# Patient Record
Sex: FEMALE | Race: WHITE | NOT HISPANIC OR LATINO | Employment: OTHER | URBAN - METROPOLITAN AREA
[De-identification: names, ages, dates, MRNs, and addresses within clinical notes are randomized per-mention and may not be internally consistent; named-entity substitution may affect disease eponyms.]

---

## 2017-07-14 DIAGNOSIS — I10 ESSENTIAL HYPERTENSION: ICD-10-CM

## 2017-07-14 RX ORDER — LISINOPRIL AND HYDROCHLOROTHIAZIDE 10; 12.5 MG/1; MG/1
TABLET ORAL
Qty: 90 TABLET | Refills: 0 | Status: SHIPPED | OUTPATIENT
Start: 2017-07-14 | End: 2017-10-10 | Stop reason: SDUPTHER

## 2017-10-06 DIAGNOSIS — Z78.0 ASYMPTOMATIC MENOPAUSAL STATE: Primary | ICD-10-CM

## 2017-10-06 DIAGNOSIS — Z12.39 SCREENING FOR BREAST CANCER: ICD-10-CM

## 2017-10-09 ENCOUNTER — DOCUMENTATION ONLY (OUTPATIENT)
Dept: FAMILY MEDICINE | Facility: CLINIC | Age: 72
End: 2017-10-09

## 2017-10-09 NOTE — PROGRESS NOTES
Pre-Visit Chart Review  For Appointment Scheduled on 10-10-17    Health Maintenance Due   Topic Date Due    Zoster Vaccine  03/29/2005    Pneumococcal (65+) (1 of 2 - PCV13) 03/29/2010    Mammogram  06/03/2015    DEXA SCAN  06/03/2016    Influenza Vaccine  08/01/2017

## 2017-10-10 ENCOUNTER — OFFICE VISIT (OUTPATIENT)
Dept: FAMILY MEDICINE | Facility: CLINIC | Age: 72
End: 2017-10-10
Payer: MEDICARE

## 2017-10-10 VITALS
WEIGHT: 170.44 LBS | BODY MASS INDEX: 31.36 KG/M2 | DIASTOLIC BLOOD PRESSURE: 54 MMHG | OXYGEN SATURATION: 96 % | SYSTOLIC BLOOD PRESSURE: 122 MMHG | TEMPERATURE: 99 F | HEART RATE: 80 BPM | HEIGHT: 62 IN | RESPIRATION RATE: 16 BRPM

## 2017-10-10 DIAGNOSIS — Z23 IMMUNIZATION DUE: ICD-10-CM

## 2017-10-10 DIAGNOSIS — I10 ESSENTIAL HYPERTENSION: ICD-10-CM

## 2017-10-10 DIAGNOSIS — M19.90 ARTHRITIS: ICD-10-CM

## 2017-10-10 DIAGNOSIS — Z00.00 ANNUAL PHYSICAL EXAM: Primary | ICD-10-CM

## 2017-10-10 DIAGNOSIS — E78.5 HYPERLIPIDEMIA, UNSPECIFIED HYPERLIPIDEMIA TYPE: ICD-10-CM

## 2017-10-10 DIAGNOSIS — I10 GOOD HYPERTENSION CONTROL: ICD-10-CM

## 2017-10-10 DIAGNOSIS — N18.30 STAGE 3 CHRONIC KIDNEY DISEASE: ICD-10-CM

## 2017-10-10 PROCEDURE — 99999 PR PBB SHADOW E&M-EST. PATIENT-LVL IV: CPT | Mod: PBBFAC,,, | Performed by: FAMILY MEDICINE

## 2017-10-10 PROCEDURE — 90662 IIV NO PRSV INCREASED AG IM: CPT | Mod: S$GLB,,, | Performed by: FAMILY MEDICINE

## 2017-10-10 PROCEDURE — G0009 ADMIN PNEUMOCOCCAL VACCINE: HCPCS | Mod: S$GLB,,, | Performed by: FAMILY MEDICINE

## 2017-10-10 PROCEDURE — G0008 ADMIN INFLUENZA VIRUS VAC: HCPCS | Mod: S$GLB,,, | Performed by: FAMILY MEDICINE

## 2017-10-10 PROCEDURE — 90670 PCV13 VACCINE IM: CPT | Mod: S$GLB,,, | Performed by: FAMILY MEDICINE

## 2017-10-10 PROCEDURE — 99499 UNLISTED E&M SERVICE: CPT | Mod: S$GLB,,, | Performed by: FAMILY MEDICINE

## 2017-10-10 PROCEDURE — 99397 PER PM REEVAL EST PAT 65+ YR: CPT | Mod: S$GLB,,, | Performed by: FAMILY MEDICINE

## 2017-10-10 RX ORDER — LISINOPRIL AND HYDROCHLOROTHIAZIDE 10; 12.5 MG/1; MG/1
TABLET ORAL
Qty: 90 TABLET | Refills: 3 | Status: SHIPPED | OUTPATIENT
Start: 2017-10-10 | End: 2018-10-12 | Stop reason: SDUPTHER

## 2017-10-10 NOTE — PROGRESS NOTES
Subjective:       Patient ID: Kelly Queen is a 72 y.o. female.    Chief Complaint: Annual Exam    72-year-old female coming in for an annual exam.  She needs a Prevnar 13 and high-dose flu vaccine and already has orders for mammogram and DEXA scan.  She is not fasting for lab.  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  She status post bilateral knee replacements, left hip and also a femur fracture with a uri.    Past Medical History:  No date: Arthritis  2/25/15: Carpal tunnel syndrome      Comment: left; last injected 2/25/15  11/14/2014: Chronic kidney disease, stage III (moderate)  No date: Hypertension    Past Surgical History:  No date: APPENDECTOMY  3/30/2010: COLONOSCOPY      Comment: Dr. Prince, ten year recheck  5/21/15: FEMUR FRACTURE SURGERY Left      Comment: DONNIE lund (L) distal femur  No date: FRACTURE SURGERY      Comment: 5- femer   No date: HYSTERECTOMY  6/7/13: JOINT REPLACEMENT Left      Comment: (L) TKA  No date: TONSILLECTOMY    Review of patient's family history indicates:    Diabetes                       Sister                    Cancer                         Sister                      Comment: colon    Hypertension                   Sister                    Diabetes                       Brother                   Cancer                         Brother                     Comment: lung    Basal cell carcinoma           Daughter                  Skin cancer                    Daughter                    Comment: squnamus     Hypertension                   Mother                    No Known Problems              Son                       Diabetes                       Brother                   Heart disease                  Brother                   Social History    Marital status:              Spouse name:                       Years of education:                 Number of children:               Social History  Main Topics    Smoking status: Never Smoker                                                                Smokeless tobacco: Never Used                        Alcohol use: No              Drug use: No                Current Outpatient Prescriptions:     aspirin 81 mg Tab, Take 1 tablet by mouth once daily. , Disp: , Rfl:     biotin 10,000 mcg Cap, Take 1 capsule by mouth once daily., Disp: , Rfl:     cyanocobalamin, vitamin B-12, 1,000 mcg TbSR, Take 1 tablet by mouth once daily., Disp: , Rfl:     fexofenadine (ALLEGRA) 180 MG tablet, Take 180 mg by mouth daily as needed. Every day PRN, Disp: , Rfl:     furosemide (LASIX) 40 MG tablet, Take 40 mg by mouth daily as needed., Disp: , Rfl:     garlic 1,000 mg Cap, Take 1 tablet by mouth once daily. , Disp: , Rfl:     lisinopril-hydrochlorothiazide (PRINZIDE,ZESTORETIC) 10-12.5 mg per tablet, TAKE 1 TABLET ONE TIME DAILY, Disp: 90 tablet, Rfl: 3    multivitamin capsule, Take 1 capsule by mouth once daily. , Disp: , Rfl:     Zoster Vaccine due on 03/29/2005  Pneumococcal (65+)(1 of 2 - PCV13) due on 03/29/2010  Mammogram due on 06/03/2015  DEXA SCAN due on 06/03/2016  Influenza Vaccine due on 08/01/2017        Review of Systems   Constitutional: Negative for chills, diaphoresis, fatigue, fever and unexpected weight change.   HENT: Negative for congestion, ear pain, hearing loss, postnasal drip, sinus pressure, sneezing, sore throat, tinnitus and trouble swallowing.    Eyes: Negative for itching and visual disturbance.   Respiratory: Negative for cough, chest tightness, shortness of breath and wheezing.    Cardiovascular: Negative for chest pain, palpitations and leg swelling.   Gastrointestinal: Negative for abdominal pain, blood in stool, constipation, diarrhea, nausea and vomiting.   Genitourinary: Negative for dysuria, frequency, hematuria, menstrual problem, pelvic pain, vaginal bleeding and vaginal discharge.   Musculoskeletal: Positive for arthralgias.  Negative for back pain, joint swelling and myalgias.   Skin: Negative for color change and rash.   Neurological: Negative for dizziness and headaches.   Hematological: Negative for adenopathy.   Psychiatric/Behavioral: Negative for sleep disturbance. The patient is not nervous/anxious.        Objective:      Physical Exam   Constitutional: She is oriented to person, place, and time. She appears well-developed. No distress.   Good blood pressure control  Obese with BMI 31.2.  She is down 2.4 pounds from her last physical August 29, 2016   HENT:   Head: Normocephalic and atraumatic.   Right Ear: External ear normal.   Left Ear: External ear normal.   Nose: Nose normal.   Mouth/Throat: Oropharynx is clear and moist. No oropharyngeal exudate.   Eyes: Conjunctivae are normal. Pupils are equal, round, and reactive to light. Right eye exhibits no discharge. Left eye exhibits no discharge. No scleral icterus.   Neck: Normal range of motion. Neck supple. No JVD present. No tracheal deviation present. No thyromegaly present.   Cardiovascular: Normal rate, regular rhythm and normal heart sounds.  Exam reveals no gallop and no friction rub.    No murmur heard.  Carotid pulses 2+ without bruit   Pulmonary/Chest: Effort normal and breath sounds normal. No respiratory distress. She has no wheezes. She has no rales. She exhibits no tenderness.   Abdominal: Soft. Bowel sounds are normal. She exhibits no distension and no mass. There is no tenderness. There is no rebound and no guarding.   Musculoskeletal: Normal range of motion. She exhibits no edema or tenderness.   Lymphadenopathy:     She has no cervical adenopathy.   Neurological: She is alert and oriented to person, place, and time. She has normal reflexes. She displays normal reflexes. No cranial nerve deficit or sensory deficit. She exhibits normal muscle tone.   Skin: Skin is warm and dry. No rash noted. She is not diaphoretic. No erythema.   Psychiatric: She has a normal  mood and affect. Her behavior is normal. Thought content normal.   Nursing note and vitals reviewed.      Assessment:       1. Annual physical exam    2. Good hypertension control    3. Hyperlipidemia, unspecified hyperlipidemia type    4. Stage 3 chronic kidney disease    5. Arthritis    6. Immunization due    7. Essential hypertension    8. BMI 31.0-31.9,adult        Plan:       1. Annual physical exam  - Comprehensive metabolic panel; Future  - Lipid panel; Future  - CBC auto differential; Future    2. Good hypertension control  No changes on blood pressure medications necessary  - Comprehensive metabolic panel; Future  - Lipid panel; Future  - CBC auto differential; Future    3. Hyperlipidemia, unspecified hyperlipidemia type  - Lipid panel; Future    4. Stage 3 chronic kidney disease  - Comprehensive metabolic panel; Future  - CBC auto differential; Future    5. Arthritis    6. Immunization due  - Influenza - High Dose (65+) (PF) (IM)  - (In Office Administered) Pneumococcal Conjugate Vaccine (13 Valent) (IM)    7. Essential hypertension  - lisinopril-hydrochlorothiazide (PRINZIDE,ZESTORETIC) 10-12.5 mg per tablet; TAKE 1 TABLET ONE TIME DAILY  Dispense: 90 tablet; Refill: 3    8. BMI 31.0-31.9,adult         Patient readiness: acceptance and barriers:none    During the course of the visit the patient was educated and counseled about the following:     Hypertension:   Medication: no change.  Dietary sodium restriction.  Regular aerobic exercise.  Obesity:   General weight loss/lifestyle modification strategies discussed (elicit support from others; identify saboteurs; non-food rewards, etc).  Informal exercise measures discussed, e.g. taking stairs instead of elevator.  Regular aerobic exercise program discussed.    Goals: Hypertension: Reduce Blood Pressure and Obesity: Reduce calorie intake and BMI    Did patient meet goals/outcomes: Yes    The following self management tools provided: blood pressure  log  excercise log    Patient Instructions (the written plan) was given to the patient/family.     Time spent with patient: 45 minutes

## 2017-10-10 NOTE — PROGRESS NOTES
Pt. Identified using  and name. VIS given to pt. Procedure was explained, pt. Verbalized understanding. Prevnar 13 administered IM in the right deltoid using sterile technique. Flue HD administered IM in the left arm,  no bleeding noted at injection sites.  Pt. tolerated procedure well.

## 2017-10-10 NOTE — PATIENT INSTRUCTIONS
Weight Management: Getting Started  Healthy bodies come in all shapes and sizes. Not all bodies are made to be thin. For some people, a healthy weight is higher than the average weight listed on weight charts. Your healthcare provider can help you decide on a healthy weight for you.    Reasons to lose weight  Losing weight can help with some health problems, such as high blood pressure, heart disease, diabetes, sleep apnea, and arthritis. You may also feel more energy.  Set your long-term goal  Your goal doesn't even have to be a specific weight. You may decide on a fitness goal (such as being able to walk 10 miles a week), or a health goal (such as lowering your blood pressure). Choose a goal that is measurable and reasonable, so you know when you've reached it. A goal of reaching a BMI of less than 25 is not always reasonable (or possible).   Make an action plan  Habits dont change overnight. Setting your goals too high can leave you feeling discouraged if you cant reach them. Be realistic. Choose one or two small changes you can make now. Set an action plan for how you are going to make these changes. When you can stick to this plan, keep making a few more small changes. Taking small steps will help you stay on the path to success.  Track your progress  Write down your goals. Then, keep a daily record of your progress. Write down what you eat and how active you are. This record lets you look back on how much youve done. It may also help when youre feeling frustrated. Reward yourself for success. Even if you dont reach every goal, give yourself credit for what you do get done.  Get support  Encouragement from others can help make losing weight easier. Ask your family members and friends for support. They may even want to join you. Also look to your healthcare provider, registered dietitian, and  for help. Your local hospital can give you more information about nutrition, exercise, and  weight loss.  Date Last Reviewed: 1/31/2016 © 2000-2017 [employer]. [address] [place] [address], [place] [address]. All rights reserved. This information is not intended as a substitute for professional medical care. Always follow your healthcare professional's instructions.        Walking for Fitness  Fitness walking has something for everyone, even people who are already fit. Walking is one of the safest ways to condition your body aerobically. It can boost energy, help you lose weight, and reduce stress.    Physical benefits  · Walking strengthens your heart and lungs, and tones your muscles.  · When walking, your feet land with less impact than in other sports. This reduces chances of muscle, bone, and joint injury.  · Regular walking improves your cholesterol levels and lowers your risk of heart disease. And it helps you control your blood sugar if you have diabetes.  · Walking is a weight-bearing activity, which helps maintain bone density. This can help prevent osteoporosis.  Personal rewards  · Taking walks can help you relax and manage stress. And fitness walking may make you feel better about yourself.  · Walking can help you sleep better at night and make you less likely to be depressed.  · Regular walking may help maintain your memory as you get older.  · Walking is a great way to spend extra time with friends and family members. Be sure to invite your dog along!  Q&A about fitness walking  Q: Will walking keep me fit?  A: Yes. Regular walking at the right pace gives you all the benefits of other aerobic activities, such as jogging and swimming.  Q: Will walking help me lose weight and keep it off?  A: Yes. Per mile, walking can burn as many calories as jogging. Your health care provider can help work walking into your weight-loss plan.  Q: Is walking safe for my health?  A: Yes. Walking is safe if you have high blood pressure, diabetes, heart disease, or other conditions. Talk to your  healthcare provider before you start.  Date Last Reviewed: 4/1/2017  © 0557-5604 [employer]. [address] [place] [address], [place] [address]. All rights reserved. This information is not intended as a substitute for professional medical care. Always follow your healthcare professional's instructions.

## 2017-10-11 ENCOUNTER — HOSPITAL ENCOUNTER (OUTPATIENT)
Dept: RADIOLOGY | Facility: CLINIC | Age: 72
Discharge: HOME | End: 2017-10-11
Attending: FAMILY MEDICINE
Payer: MEDICARE

## 2017-10-11 DIAGNOSIS — Z12.39 SCREENING FOR BREAST CANCER: ICD-10-CM

## 2017-10-11 DIAGNOSIS — Z78.0 ASYMPTOMATIC MENOPAUSAL STATE: ICD-10-CM

## 2017-10-11 PROCEDURE — 77080 DXA BONE DENSITY AXIAL: CPT | Mod: TC,PO

## 2017-10-11 PROCEDURE — 77067 SCR MAMMO BI INCL CAD: CPT | Mod: 26,,, | Performed by: RADIOLOGY

## 2017-10-11 PROCEDURE — 77080 DXA BONE DENSITY AXIAL: CPT | Mod: 26,,, | Performed by: RADIOLOGY

## 2017-10-11 PROCEDURE — 77063 BREAST TOMOSYNTHESIS BI: CPT | Mod: 26,,, | Performed by: RADIOLOGY

## 2017-10-11 PROCEDURE — 77067 SCR MAMMO BI INCL CAD: CPT | Mod: TC

## 2018-02-23 ENCOUNTER — PES CALL (OUTPATIENT)
Dept: ADMINISTRATIVE | Facility: CLINIC | Age: 73
End: 2018-02-23

## 2018-05-09 ENCOUNTER — OFFICE VISIT (OUTPATIENT)
Dept: FAMILY MEDICINE | Facility: CLINIC | Age: 73
End: 2018-05-09
Payer: MEDICARE

## 2018-05-09 VITALS
HEART RATE: 74 BPM | DIASTOLIC BLOOD PRESSURE: 58 MMHG | HEIGHT: 62 IN | SYSTOLIC BLOOD PRESSURE: 144 MMHG | RESPIRATION RATE: 18 BRPM | WEIGHT: 168.44 LBS | BODY MASS INDEX: 31 KG/M2 | OXYGEN SATURATION: 96 % | TEMPERATURE: 99 F

## 2018-05-09 DIAGNOSIS — J32.4 PANSINUSITIS, UNSPECIFIED CHRONICITY: Primary | ICD-10-CM

## 2018-05-09 PROCEDURE — 99999 PR PBB SHADOW E&M-EST. PATIENT-LVL III: CPT | Mod: PBBFAC,,, | Performed by: FAMILY MEDICINE

## 2018-05-09 PROCEDURE — 3078F DIAST BP <80 MM HG: CPT | Mod: CPTII,S$GLB,, | Performed by: FAMILY MEDICINE

## 2018-05-09 PROCEDURE — 3077F SYST BP >= 140 MM HG: CPT | Mod: CPTII,S$GLB,, | Performed by: FAMILY MEDICINE

## 2018-05-09 PROCEDURE — 99214 OFFICE O/P EST MOD 30 MIN: CPT | Mod: S$GLB,,, | Performed by: FAMILY MEDICINE

## 2018-05-09 RX ORDER — PREDNISONE 20 MG/1
TABLET ORAL
Qty: 10 TABLET | Refills: 0 | Status: SHIPPED | OUTPATIENT
Start: 2018-05-09 | End: 2018-10-25 | Stop reason: ALTCHOICE

## 2018-05-09 RX ORDER — AMOXICILLIN AND CLAVULANATE POTASSIUM 875; 125 MG/1; MG/1
1 TABLET, FILM COATED ORAL 2 TIMES DAILY
Qty: 20 TABLET | Refills: 0 | Status: SHIPPED | OUTPATIENT
Start: 2018-05-09 | End: 2018-05-19

## 2018-05-09 NOTE — PROGRESS NOTES
Subjective:       Patient ID: Kelly Queen is a 73 y.o. female.    Chief Complaint: No chief complaint on file.    Sinusitis   This is a new problem. The current episode started 1 to 4 weeks ago. The problem has been gradually worsening since onset. Maximum temperature: subjective. The pain is moderate. Associated symptoms include congestion, coughing, sinus pressure and a sore throat. Pertinent negatives include no shortness of breath. Treatments tried: Mucinex Sinus Max. The treatment provided mild relief.     Review of Systems   Constitutional: Negative for fever.   HENT: Positive for congestion, sinus pressure and sore throat.    Respiratory: Positive for cough. Negative for shortness of breath.    Cardiovascular: Negative for chest pain.   Gastrointestinal: Negative for abdominal pain and nausea.   Skin: Negative for rash.   All other systems reviewed and are negative.      Objective:      Physical Exam   Constitutional: She appears well-developed. No distress.   HENT:   Right Ear: Tympanic membrane normal. Tympanic membrane is not erythematous.   Left Ear: Tympanic membrane normal. Tympanic membrane is not erythematous.   Nose: Mucosal edema present. Right sinus exhibits maxillary sinus tenderness. Left sinus exhibits maxillary sinus tenderness and frontal sinus tenderness.   Mouth/Throat: Posterior oropharyngeal erythema present. Tonsils are 2+ on the right. Tonsils are 2+ on the left. No tonsillar exudate (erythema and swelling noted here; pt is s/p Tonsillectomy).   Neck: Neck supple.   Cardiovascular: Normal rate and regular rhythm.    No murmur heard.  Pulmonary/Chest: Effort normal and breath sounds normal.   Lymphadenopathy:     She has no cervical adenopathy.   Skin: Skin is warm and dry.       Assessment:       1. Pansinusitis, unspecified chronicity        Plan:         Diagnoses and all orders for this visit:    Pansinusitis, unspecified chronicity    Other orders  -     amoxicillin-clavulanate  875-125mg (AUGMENTIN) 875-125 mg per tablet; Take 1 tablet by mouth 2 (two) times daily. Take after meals.  -     predniSONE (DELTASONE) 20 MG tablet; One BID for 3 days then once a day orally

## 2018-05-25 ENCOUNTER — PES CALL (OUTPATIENT)
Dept: ADMINISTRATIVE | Facility: CLINIC | Age: 73
End: 2018-05-25

## 2018-08-01 ENCOUNTER — PES CALL (OUTPATIENT)
Dept: ADMINISTRATIVE | Facility: CLINIC | Age: 73
End: 2018-08-01

## 2018-10-12 DIAGNOSIS — I10 ESSENTIAL HYPERTENSION: ICD-10-CM

## 2018-10-12 RX ORDER — LISINOPRIL AND HYDROCHLOROTHIAZIDE 10; 12.5 MG/1; MG/1
TABLET ORAL
Qty: 90 TABLET | Refills: 0 | Status: SHIPPED | OUTPATIENT
Start: 2018-10-12 | End: 2019-04-25 | Stop reason: SDUPTHER

## 2018-10-25 ENCOUNTER — OFFICE VISIT (OUTPATIENT)
Dept: FAMILY MEDICINE | Facility: CLINIC | Age: 73
End: 2018-10-25
Payer: MEDICARE

## 2018-10-25 VITALS
HEIGHT: 62 IN | DIASTOLIC BLOOD PRESSURE: 73 MMHG | HEART RATE: 80 BPM | BODY MASS INDEX: 31.1 KG/M2 | WEIGHT: 169 LBS | TEMPERATURE: 99 F | SYSTOLIC BLOOD PRESSURE: 135 MMHG

## 2018-10-25 DIAGNOSIS — Z23 NEED FOR INFLUENZA VACCINATION: ICD-10-CM

## 2018-10-25 DIAGNOSIS — Z79.899 HIGH RISK MEDICATION USE: ICD-10-CM

## 2018-10-25 DIAGNOSIS — Z23 NEED FOR PNEUMOCOCCAL VACCINATION: ICD-10-CM

## 2018-10-25 DIAGNOSIS — E78.5 DYSLIPIDEMIA: ICD-10-CM

## 2018-10-25 DIAGNOSIS — R73.03 PREDIABETES: ICD-10-CM

## 2018-10-25 DIAGNOSIS — I10 ESSENTIAL HYPERTENSION: Primary | ICD-10-CM

## 2018-10-25 DIAGNOSIS — M17.0 PRIMARY OSTEOARTHRITIS OF BOTH KNEES: ICD-10-CM

## 2018-10-25 DIAGNOSIS — N18.30 CHRONIC KIDNEY DISEASE, STAGE III (MODERATE): ICD-10-CM

## 2018-10-25 DIAGNOSIS — Z79.01 ANTICOAGULANT LONG-TERM USE: ICD-10-CM

## 2018-10-25 PROCEDURE — 90662 IIV NO PRSV INCREASED AG IM: CPT | Mod: PBBFAC,PO

## 2018-10-25 PROCEDURE — 1101F PT FALLS ASSESS-DOCD LE1/YR: CPT | Mod: CPTII,,, | Performed by: NURSE PRACTITIONER

## 2018-10-25 PROCEDURE — 99999 PR PBB SHADOW E&M-EST. PATIENT-LVL IV: CPT | Mod: PBBFAC,,, | Performed by: NURSE PRACTITIONER

## 2018-10-25 PROCEDURE — 99499 UNLISTED E&M SERVICE: CPT | Mod: HCNC,S$GLB,, | Performed by: NURSE PRACTITIONER

## 2018-10-25 PROCEDURE — 3078F DIAST BP <80 MM HG: CPT | Mod: CPTII,,, | Performed by: NURSE PRACTITIONER

## 2018-10-25 PROCEDURE — 99214 OFFICE O/P EST MOD 30 MIN: CPT | Mod: PBBFAC,PO,25 | Performed by: NURSE PRACTITIONER

## 2018-10-25 PROCEDURE — 3075F SYST BP GE 130 - 139MM HG: CPT | Mod: CPTII,,, | Performed by: NURSE PRACTITIONER

## 2018-10-25 PROCEDURE — 99214 OFFICE O/P EST MOD 30 MIN: CPT | Mod: S$PBB,25,, | Performed by: NURSE PRACTITIONER

## 2018-10-25 PROCEDURE — 90732 PPSV23 VACC 2 YRS+ SUBQ/IM: CPT | Mod: PBBFAC,PO

## 2018-10-25 RX ORDER — NAPROXEN SODIUM 220 MG
220 TABLET ORAL 2 TIMES DAILY WITH MEALS
COMMUNITY
Start: 2018-10-25 | End: 2019-04-25 | Stop reason: ALTCHOICE

## 2018-10-25 NOTE — PROGRESS NOTES
Subjective:       Patient ID: Kelly Queen is a 73 y.o. female.    Chief Complaint: Hypertension    Chief Complaint  Chief Complaint   Patient presents with    Hypertension       HPI  Kelly Queen is a 73 y.o. female with medical diagnoses as listed in the medical history and problem list that presents for a follow-up on hypertension. Patient is regularly treated for HTN, CKD3, and osteoarthritis. Last Fasting Glucose was 116 on 10/11/17. Blood pressure today is 135/73. BMI today is 30.91 and patient has gained 1 pound since last visit. Established patient with last clinic appointment 5/9/2018.      PAST MEDICAL HISTORY:  Past Medical History:   Diagnosis Date    Arthritis     Carpal tunnel syndrome 2/25/15    left; last injected 2/25/15    Chronic kidney disease, stage III (moderate) 11/14/2014    Hypertension        PAST SURGICAL HISTORY:  Past Surgical History:   Procedure Laterality Date    APPENDECTOMY      COLONOSCOPY  3/30/2010    Dr. Prince, ten year recheck    FEMUR FRACTURE SURGERY Left 5/21/15    IM rodding (L) distal femur    FRACTURE SURGERY      5- femer     HYSTERECTOMY      JOINT REPLACEMENT Left 6/7/13    (L) TKA    OOPHORECTOMY      TONSILLECTOMY         SOCIAL HISTORY:  Social History     Socioeconomic History    Marital status:      Spouse name: Not on file    Number of children: Not on file    Years of education: Not on file    Highest education level: Not on file   Social Needs    Financial resource strain: Not on file    Food insecurity - worry: Not on file    Food insecurity - inability: Not on file    Transportation needs - medical: Not on file    Transportation needs - non-medical: Not on file   Occupational History    Not on file   Tobacco Use    Smoking status: Never Smoker    Smokeless tobacco: Never Used   Substance and Sexual Activity    Alcohol use: No    Drug use: No    Sexual activity: Not on file   Other Topics Concern    Not on  file   Social History Narrative    Not on file       FAMILY HISTORY:  Family History   Problem Relation Age of Onset    Diabetes Sister     Cancer Sister         colon    Hypertension Sister     Diabetes Brother     Cancer Brother         lung    Basal cell carcinoma Daughter     Skin cancer Daughter         squnamus     Hypertension Mother     No Known Problems Son     Diabetes Brother     Heart disease Brother        ALLERGIES AND MEDICATIONS: updated and reviewed.  Review of patient's allergies indicates:   Allergen Reactions    Codeine      Other reaction(s): pt does not want to take     Current Outpatient Medications   Medication Sig Dispense Refill    aspirin 81 mg Tab Take 1 tablet by mouth once daily.       biotin 10,000 mcg Cap Take 1 capsule by mouth once daily.      cyanocobalamin, vitamin B-12, 1,000 mcg TbSR Take 1 tablet by mouth once daily.      fexofenadine (ALLEGRA) 180 MG tablet Take 180 mg by mouth daily as needed. Every day PRN      furosemide (LASIX) 40 MG tablet Take 40 mg by mouth daily as needed.      garlic 1,000 mg Cap Take 1 tablet by mouth once daily.       lisinopril-hydrochlorothiazide (PRINZIDE,ZESTORETIC) 10-12.5 mg per tablet TAKE 1 TABLET EVERY DAY 90 tablet 0    multivitamin capsule Take 1 capsule by mouth once daily.       naproxen sodium (ALEVE) 220 MG tablet Take 1 tablet (220 mg total) by mouth 2 (two) times daily with meals. PRN arthritic pain       No current facility-administered medications for this visit.        I have reviewed the patient's medical history in detail and updated the computerized patient record.    Review of Systems   Constitutional: Negative for activity change, appetite change, chills, fatigue, fever and unexpected weight change.        Patient does not exercise regularly. Active lifestyle.    HENT: Positive for hearing loss. Negative for congestion, ear discharge, ear pain, postnasal drip, rhinorrhea, sinus pressure, sinus pain,  "sneezing and sore throat.         Patient wears bilateral hearing aids.    Eyes: Negative for pain, discharge, itching and visual disturbance.        Last eye exam 2 years ago. Patient wear OTC readers.    Respiratory: Negative for cough, chest tightness, shortness of breath and wheezing.    Cardiovascular: Negative for chest pain, palpitations and leg swelling.   Gastrointestinal: Negative for abdominal pain, constipation, diarrhea, nausea and vomiting.   Genitourinary: Negative for decreased urine volume, difficulty urinating, frequency, hematuria, urgency and vaginal bleeding.        Nocturia. Patient wakes up to from 1- 2 times a night to urinate. Total hysterectomy 1989.    Musculoskeletal: Positive for arthralgias and joint swelling. Negative for back pain, myalgias, neck pain and neck stiffness.        Patient reports has arthritis to bilateral knees and pain is controlled with Aleve or Tylenol over the counter as needed.   Skin: Negative for rash and wound.   Neurological: Negative for dizziness, weakness, light-headedness, numbness and headaches.   Hematological: Does not bruise/bleed easily.   Psychiatric/Behavioral: Positive for sleep disturbance. Negative for dysphoric mood. The patient is not nervous/anxious.         Patient reports she goes through stages where she falls asleep easily depending on what she is reading while laying in the bed. Patient falls back to sleep easily after waking up.          Objective:      Vitals:    10/25/18 0931   BP: 135/73   BP Location: Right arm   Patient Position: Sitting   BP Method: Large (Automatic)   Pulse: 80   Temp: 98.7 °F (37.1 °C)   TempSrc: Oral   Weight: 76.7 kg (169 lb)   Height: 5' 2" (1.575 m)     Physical Exam   Constitutional: She is oriented to person, place, and time. Vital signs are normal. She appears well-developed. No distress.   Blood pressure: 135/73   HENT:   Head: Normocephalic and atraumatic.   Right Ear: Tympanic membrane, external ear and " ear canal normal.   Left Ear: Tympanic membrane, external ear and ear canal normal.   Nose: Nose normal. No mucosal edema.   Mouth/Throat: Uvula is midline, oropharynx is clear and moist and mucous membranes are normal. No oropharyngeal exudate.   Eyes: Conjunctivae and lids are normal. Pupils are equal, round, and reactive to light. Right eye exhibits no discharge. Left eye exhibits no discharge. Right conjunctiva is not injected. Left conjunctiva is not injected.   Neck: Normal range of motion. Neck supple. Normal carotid pulses present. Carotid bruit is not present.   Cardiovascular: Normal rate, regular rhythm, S1 normal, S2 normal, normal heart sounds, intact distal pulses and normal pulses.   No murmur heard.  Pulses:       Carotid pulses are 2+ on the right side, and 2+ on the left side.       Radial pulses are 2+ on the right side, and 2+ on the left side.        Posterior tibial pulses are 2+ on the right side, and 2+ on the left side.   No edema noted.    Pulmonary/Chest: Effort normal and breath sounds normal. No respiratory distress. She has no decreased breath sounds. She has no wheezes. She has no rhonchi. She has no rales.   Abdominal: Soft. Normal appearance, normal aorta and bowel sounds are normal. She exhibits no distension, no abdominal bruit, no pulsatile midline mass and no mass. There is no hepatosplenomegaly. There is no tenderness. No hernia.   Musculoskeletal: Normal range of motion. She exhibits no edema, tenderness or deformity.   Lymphadenopathy:     She has no cervical adenopathy.        Right: No supraclavicular adenopathy present.        Left: No supraclavicular adenopathy present.   Neurological: She is alert and oriented to person, place, and time. She has normal strength.   Skin: Skin is warm, dry and intact. No rash noted. She is not diaphoretic. No erythema. No pallor.   Psychiatric: She has a normal mood and affect. Her speech is normal and behavior is normal. Judgment and  thought content normal. Her mood appears not anxious. Cognition and memory are normal. She does not exhibit a depressed mood.   Nursing note and vitals reviewed.        Assessment:       1. Essential hypertension    2. Prediabetes    3. Dyslipidemia    4. Chronic kidney disease, stage III (moderate)    5. Primary osteoarthritis of both knees    6. Anticoagulant long-term use    7. BMI 30.0-30.9,adult    8. High risk medication use    9. Need for influenza vaccination    10. Need for pneumococcal vaccination          Plan:       Kelly was seen today for hypertension.  Discussed healthy diet, regular exercise, necessary labs, age appropriate cancer screening, and routine vaccinations.    Educational handouts provided. Prevention guidelines women age 65 and over  Diagnoses and all orders for this visit:    Essential hypertension  -     Comprehensive metabolic panel; Future  -     CBC auto differential; Future  - Blood pressure controlled 135/73, continue current medication without change  - Educational handouts provided. Controlling high blood pressure    Prediabetes  -     Comprehensive metabolic panel; Future  - At this time lifestyle and diet changes are recommended. Increase exercise and lose weight by eating a portion controlled well-balanced diet.  Avoid concentrated sweets like cookies, cakes, and candy.  Do not drink sugar sweetened soft drinks.  Eat fewer white carbohydrates like potatoes, rice, bread, and pasta.  Choose sweet potatoes, brown rice, whole wheat bread and wheat pasta.    - Educational handouts provided. Prediabetes.    CMP  Sodium   Date Value Ref Range Status   10/11/2017 144 136 - 145 mmol/L Final     Potassium   Date Value Ref Range Status   10/11/2017 3.8 3.5 - 5.1 mmol/L Final     Chloride   Date Value Ref Range Status   10/11/2017 108 95 - 110 mmol/L Final     CO2   Date Value Ref Range Status   10/11/2017 24 23 - 29 mmol/L Final     Glucose   Date Value Ref Range Status   10/11/2017 116  (H) 70 - 110 mg/dL Final     BUN, Bld   Date Value Ref Range Status   10/11/2017 17 8 - 23 mg/dL Final     Creatinine   Date Value Ref Range Status   10/11/2017 1.0 0.5 - 1.4 mg/dL Final     Calcium   Date Value Ref Range Status   10/11/2017 9.1 8.7 - 10.5 mg/dL Final     Total Protein   Date Value Ref Range Status   10/11/2017 6.5 6.0 - 8.4 g/dL Final     Albumin   Date Value Ref Range Status   10/11/2017 3.5 3.5 - 5.2 g/dL Final     Total Bilirubin   Date Value Ref Range Status   10/11/2017 0.4 0.1 - 1.0 mg/dL Final     Comment:     For infants and newborns, interpretation of results should be based  on gestational age, weight and in agreement with clinical  observations.  Premature Infant recommended reference ranges:  Up to 24 hours.............<8.0 mg/dL  Up to 48 hours............<12.0 mg/dL  3-5 days..................<15.0 mg/dL  6-29 days.................<15.0 mg/dL       Alkaline Phosphatase   Date Value Ref Range Status   10/11/2017 64 55 - 135 U/L Final     AST   Date Value Ref Range Status   10/11/2017 18 10 - 40 U/L Final     ALT   Date Value Ref Range Status   10/11/2017 14 10 - 44 U/L Final     Anion Gap   Date Value Ref Range Status   10/11/2017 12 8 - 16 mmol/L Final     eGFR if    Date Value Ref Range Status   10/11/2017 >60.0 >60 mL/min/1.73 m^2 Final     eGFR if non    Date Value Ref Range Status   10/11/2017 56.4 (A) >60 mL/min/1.73 m^2 Final     Comment:     Calculation used to obtain the estimated glomerular filtration  rate (eGFR) is the CKD-EPI equation. Since race is unknown   in our information system, the eGFR values for   -American and Non--American patients are given   for each creatinine result.       Dyslipidemia  -     Lipid panel; Future    - The 10-year ASCVD risk score (Londonlili POWELL Jr., et al., 2013) is: 18.9%    Values used to calculate the score:      Age: 73 years      Sex: Female      Is Non- : No      Diabetic:  No      Tobacco smoker: No      Systolic Blood Pressure: 135 mmHg      Is BP treated: Yes      HDL Cholesterol: 45 mg/dL      Total Cholesterol: 181 mg/dL     Lab Results   Component Value Date    CHOL 181 10/11/2017    CHOL 201 (H) 08/30/2016    CHOL 208 (H) 07/10/2015     Lab Results   Component Value Date    HDL 45 10/11/2017    HDL 48 08/30/2016    HDL 60 07/10/2015     Lab Results   Component Value Date    LDLCALC 107.6 10/11/2017    LDLCALC 129.4 08/30/2016    LDLCALC 119.8 07/10/2015     Lab Results   Component Value Date    TRIG 142 10/11/2017    TRIG 118 08/30/2016    TRIG 141 07/10/2015     Lab Results   Component Value Date    CHOLHDL 24.9 10/11/2017    CHOLHDL 23.9 08/30/2016    CHOLHDL 28.8 07/10/2015      Will readdress need for statin medication when new lab results are available  Chronic kidney disease, stage III (moderate)     BMP  Lab Results   Component Value Date     10/11/2017    K 3.8 10/11/2017     10/11/2017    CO2 24 10/11/2017    BUN 17 10/11/2017    CREATININE 1.0 10/11/2017    CALCIUM 9.1 10/11/2017    ANIONGAP 12 10/11/2017    ESTGFRAFRICA >60.0 10/11/2017    EGFRNONAA 56.4 (A) 10/11/2017       Discussed CKD with patient, stable GFR at this time, ok to continue aleve as needed, will readdress when new blood work results are available.  Primary osteoarthritis of both knees  -     naproxen sodium (ALEVE) 220 MG tablet; Take 1 tablet (220 mg total) by mouth 2 (two) times daily with meals. PRN arthritic pain, may need to use tylenol only if GFR is decreased    Anticoagulant long-term use  -     CBC auto differential; Future  - Stable on aspirin 81 mg daily, to continue    BMI 30.0-30.9,adult   BMI today is 30.91 and has gained 1 pound since last visit on 5/9/18.   Weight loss recommended with well balanced diet and portion controlled meals and increased activity.    The patient's BMI has been recorded in the chart. The patient has been provided educational materials regarding the  benefits of attaining and maintaining a normal weight. We will continue  to address and follow this issue during follow up visits.    High risk medication use  -     Comprehensive metabolic panel; Future  -     CBC auto differential; Future    Need for influenza vaccination  -     Influenza - High Dose (65+) (PF) (IM)    Need for pneumococcal vaccination  -     (In Office Administered) Pneumococcal Polysaccharide Vaccine (23 Valent) (SQ/IM)      Follow-up in about 6 months (around 4/25/2019) for Follow up HTN with Dr. Mack.      Patient readiness: acceptance and barriers:none    During the course of the visit the patient was educated and counseled about the following:     Hypertension:   Medication: no change.  Dietary sodium restriction.  Regular aerobic exercise.  Check blood pressures daily and record.  Follow up: 6 months and as needed.  Obesity:   General weight loss/lifestyle modification strategies discussed (elicit support from others; identify saboteurs; non-food rewards, etc).  Informal exercise measures discussed, e.g. taking stairs instead of elevator.  Regular aerobic exercise program discussed.    Goals: Hypertension: Reduce Blood Pressure and Obesity: Reduce calorie intake and BMI    Did patient meet goals/outcomes: Yes for HTN and No for Obesity     The following self management tools provided: declined    Patient Instructions (the written plan) was given to the patient/family.     Time spent with patient: 30 minutes    Barriers to medications present (no )    Adverse reactions to current medications (no)    Over the counter medications reviewed (Yes)

## 2018-10-26 ENCOUNTER — LAB VISIT (OUTPATIENT)
Dept: LAB | Facility: HOSPITAL | Age: 73
End: 2018-10-26
Attending: NURSE PRACTITIONER
Payer: MEDICARE

## 2018-10-26 DIAGNOSIS — Z79.01 ANTICOAGULANT LONG-TERM USE: ICD-10-CM

## 2018-10-26 DIAGNOSIS — R73.03 PREDIABETES: ICD-10-CM

## 2018-10-26 DIAGNOSIS — E78.5 DYSLIPIDEMIA: ICD-10-CM

## 2018-10-26 DIAGNOSIS — Z79.899 HIGH RISK MEDICATION USE: ICD-10-CM

## 2018-10-26 DIAGNOSIS — I10 ESSENTIAL HYPERTENSION: ICD-10-CM

## 2018-10-26 LAB
ALBUMIN SERPL BCP-MCNC: 3.8 G/DL
ALP SERPL-CCNC: 55 U/L
ALT SERPL W/O P-5'-P-CCNC: 20 U/L
ANION GAP SERPL CALC-SCNC: 8 MMOL/L
AST SERPL-CCNC: 20 U/L
BASOPHILS # BLD AUTO: 0.06 K/UL
BASOPHILS NFR BLD: 0.5 %
BILIRUB SERPL-MCNC: 0.7 MG/DL
BUN SERPL-MCNC: 15 MG/DL
CALCIUM SERPL-MCNC: 9.7 MG/DL
CHLORIDE SERPL-SCNC: 108 MMOL/L
CHOLEST SERPL-MCNC: 198 MG/DL
CHOLEST/HDLC SERPL: 4.1 {RATIO}
CO2 SERPL-SCNC: 27 MMOL/L
CREAT SERPL-MCNC: 0.9 MG/DL
DIFFERENTIAL METHOD: ABNORMAL
EOSINOPHIL # BLD AUTO: 0.6 K/UL
EOSINOPHIL NFR BLD: 5.4 %
ERYTHROCYTE [DISTWIDTH] IN BLOOD BY AUTOMATED COUNT: 13.3 %
EST. GFR  (AFRICAN AMERICAN): >60 ML/MIN/1.73 M^2
EST. GFR  (NON AFRICAN AMERICAN): >60 ML/MIN/1.73 M^2
GLUCOSE SERPL-MCNC: 126 MG/DL
HCT VFR BLD AUTO: 41.2 %
HDLC SERPL-MCNC: 48 MG/DL
HDLC SERPL: 24.2 %
HGB BLD-MCNC: 13 G/DL
IMM GRANULOCYTES # BLD AUTO: 0.03 K/UL
IMM GRANULOCYTES NFR BLD AUTO: 0.3 %
LDLC SERPL CALC-MCNC: 122.2 MG/DL
LYMPHOCYTES # BLD AUTO: 2.6 K/UL
LYMPHOCYTES NFR BLD: 23.8 %
MCH RBC QN AUTO: 29.2 PG
MCHC RBC AUTO-ENTMCNC: 31.6 G/DL
MCV RBC AUTO: 93 FL
MONOCYTES # BLD AUTO: 0.8 K/UL
MONOCYTES NFR BLD: 7.4 %
NEUTROPHILS # BLD AUTO: 6.9 K/UL
NEUTROPHILS NFR BLD: 62.6 %
NONHDLC SERPL-MCNC: 150 MG/DL
NRBC BLD-RTO: 0 /100 WBC
PLATELET # BLD AUTO: 298 K/UL
PMV BLD AUTO: 10.9 FL
POTASSIUM SERPL-SCNC: 4.3 MMOL/L
PROT SERPL-MCNC: 6.7 G/DL
RBC # BLD AUTO: 4.45 M/UL
SODIUM SERPL-SCNC: 143 MMOL/L
TRIGL SERPL-MCNC: 139 MG/DL
WBC # BLD AUTO: 11.08 K/UL

## 2018-10-26 PROCEDURE — 80061 LIPID PANEL: CPT

## 2018-10-26 PROCEDURE — 85025 COMPLETE CBC W/AUTO DIFF WBC: CPT

## 2018-10-26 PROCEDURE — 36415 COLL VENOUS BLD VENIPUNCTURE: CPT | Mod: PO

## 2018-10-26 PROCEDURE — 80053 COMPREHEN METABOLIC PANEL: CPT

## 2018-10-28 DIAGNOSIS — R73.9 HYPERGLYCEMIA: Primary | ICD-10-CM

## 2018-10-29 ENCOUNTER — TELEPHONE (OUTPATIENT)
Dept: FAMILY MEDICINE | Facility: CLINIC | Age: 73
End: 2018-10-29

## 2018-10-29 DIAGNOSIS — E78.5 DYSLIPIDEMIA: Primary | ICD-10-CM

## 2018-10-29 RX ORDER — ROSUVASTATIN CALCIUM 20 MG/1
20 TABLET, COATED ORAL NIGHTLY
Qty: 90 TABLET | Refills: 3 | Status: SHIPPED | OUTPATIENT
Start: 2018-10-29 | End: 2020-01-30 | Stop reason: SDUPTHER

## 2018-10-29 NOTE — TELEPHONE ENCOUNTER
Crestor 20 mg once daily was sent to the [facility] mail order pharmacy. We will get back with her with the results of the diabetes blood work.   Thanks.  Kayla

## 2018-10-29 NOTE — TELEPHONE ENCOUNTER
Patient notified of results, she has no questions at this time.Verbalized understanding of recommendations. She agrees to begin a statin medication.   Fasting labs scheduled.                 ----- Message from Kayla Dietrich NP sent at 10/28/2018 12:24 PM CDT -----  Please call the patient and let her know that I have reviewed her recent blood work results.  The lipid panel looks pretty good.  The total cholesterol is 198, goal less than 200.  The HDL, or good cholesterol, is 48, goal greater than 45.  The LDL, or bad cholesterol, is a bit elevated at 122.2, goal with high blood pressure is less than 100.  The triglycerides are normal at 139.  The 10-year ASCVD risk score (Avelino POWELL Jr., et al., 2013) is: 19.1% which is elevated.  This indicates a 19.1% risk of a heart attack or stroke in the next 10 years.  Whenever the atherosclerotic cardiovascular disease risk score is 7.5% or greater a statin medication is recommended to lower cardiovascular risk and cholesterol.  If she willing to start a statin medication at this time?    Values used to calculate the score:      Age: 73 years      Sex: Female      Is Non- : No      Diabetic: No      Tobacco smoker: No      Systolic Blood Pressure: 135 mmHg      Is BP treated: Yes      HDL Cholesterol: 48 mg/dL      Total Cholesterol: 198 mg/dL  Her blood sugar is elevated at 126.  A fasting blood sugar of 126 or greater on two occasions is considered diagnostic for diabetes.  At this time I would recommend repeating a fasting blood sugar along with a hemoglobin A1c in about two weeks to check for diabetes.  I have ordered the fasting blood sugar and hemoglobin A1c, please schedule for the patient in two weeks.  All other electrolytes, kidney functions, and liver functions are normal.  The complete blood count is normal.  There is no anemia.  The white blood cell and platelet counts are normal.  Thanks  Kayla

## 2018-11-12 ENCOUNTER — LAB VISIT (OUTPATIENT)
Dept: LAB | Facility: HOSPITAL | Age: 73
End: 2018-11-12
Attending: NURSE PRACTITIONER
Payer: MEDICARE

## 2018-11-12 DIAGNOSIS — R73.9 HYPERGLYCEMIA: ICD-10-CM

## 2018-11-12 LAB
ESTIMATED AVG GLUCOSE: 126 MG/DL
GLUCOSE SERPL-MCNC: 89 MG/DL
HBA1C MFR BLD HPLC: 6 %

## 2018-11-12 PROCEDURE — 82947 ASSAY GLUCOSE BLOOD QUANT: CPT | Mod: HCWC

## 2018-11-12 PROCEDURE — 83036 HEMOGLOBIN GLYCOSYLATED A1C: CPT | Mod: HCWC

## 2018-11-12 PROCEDURE — 36415 COLL VENOUS BLD VENIPUNCTURE: CPT | Mod: HCWC,PO

## 2018-12-18 ENCOUNTER — PES CALL (OUTPATIENT)
Dept: ADMINISTRATIVE | Facility: CLINIC | Age: 73
End: 2018-12-18

## 2019-01-25 ENCOUNTER — OFFICE VISIT (OUTPATIENT)
Dept: FAMILY MEDICINE | Facility: CLINIC | Age: 74
End: 2019-01-25
Payer: MEDICARE

## 2019-01-25 VITALS
SYSTOLIC BLOOD PRESSURE: 128 MMHG | HEIGHT: 62 IN | OXYGEN SATURATION: 98 % | HEART RATE: 95 BPM | TEMPERATURE: 98 F | BODY MASS INDEX: 31.28 KG/M2 | DIASTOLIC BLOOD PRESSURE: 80 MMHG | WEIGHT: 170 LBS

## 2019-01-25 DIAGNOSIS — B34.9 VIRAL ILLNESS: ICD-10-CM

## 2019-01-25 DIAGNOSIS — B02.9 HERPES ZOSTER WITHOUT COMPLICATION: Primary | ICD-10-CM

## 2019-01-25 PROCEDURE — 3074F PR MOST RECENT SYSTOLIC BLOOD PRESSURE < 130 MM HG: ICD-10-PCS | Mod: CPTII,S$GLB,, | Performed by: PHYSICIAN ASSISTANT

## 2019-01-25 PROCEDURE — 1101F PR PT FALLS ASSESS DOC 0-1 FALLS W/OUT INJ PAST YR: ICD-10-PCS | Mod: CPTII,S$GLB,, | Performed by: PHYSICIAN ASSISTANT

## 2019-01-25 PROCEDURE — 99213 PR OFFICE/OUTPT VISIT, EST, LEVL III, 20-29 MIN: ICD-10-PCS | Mod: S$GLB,,, | Performed by: PHYSICIAN ASSISTANT

## 2019-01-25 PROCEDURE — 3079F DIAST BP 80-89 MM HG: CPT | Mod: CPTII,S$GLB,, | Performed by: PHYSICIAN ASSISTANT

## 2019-01-25 PROCEDURE — 3074F SYST BP LT 130 MM HG: CPT | Mod: CPTII,S$GLB,, | Performed by: PHYSICIAN ASSISTANT

## 2019-01-25 PROCEDURE — 1101F PT FALLS ASSESS-DOCD LE1/YR: CPT | Mod: CPTII,S$GLB,, | Performed by: PHYSICIAN ASSISTANT

## 2019-01-25 PROCEDURE — 3079F PR MOST RECENT DIASTOLIC BLOOD PRESSURE 80-89 MM HG: ICD-10-PCS | Mod: CPTII,S$GLB,, | Performed by: PHYSICIAN ASSISTANT

## 2019-01-25 PROCEDURE — 99213 OFFICE O/P EST LOW 20 MIN: CPT | Mod: S$GLB,,, | Performed by: PHYSICIAN ASSISTANT

## 2019-01-25 RX ORDER — AMOXICILLIN 500 MG
CAPSULE ORAL DAILY
COMMUNITY
End: 2019-04-25 | Stop reason: ALTCHOICE

## 2019-01-25 RX ORDER — VALACYCLOVIR HYDROCHLORIDE 1 G/1
1000 TABLET, FILM COATED ORAL 3 TIMES DAILY
Qty: 21 TABLET | Refills: 0 | Status: SHIPPED | OUTPATIENT
Start: 2019-01-25 | End: 2019-04-25 | Stop reason: ALTCHOICE

## 2019-01-25 RX ORDER — AMITRIPTYLINE HYDROCHLORIDE 10 MG/1
TABLET, FILM COATED ORAL
Qty: 60 TABLET | Refills: 1 | Status: SHIPPED | OUTPATIENT
Start: 2019-01-25 | End: 2021-12-01

## 2019-01-25 NOTE — PROGRESS NOTES
Subjective:       Patient ID: Kelly Queen is a 73 y.o. female.    Chief Complaint: Rash and Sinus Problem    HPI   Painful rash R side of face and scalp x 4 days  R ear area also hurts  Nasal sinus congestion x 5 days  Pt has stopped taking the Crestor   Review of Systems   Constitutional: Negative.  Negative for activity change, appetite change, chills, diaphoresis, fatigue, fever and unexpected weight change.   HENT: Positive for congestion, ear pain and postnasal drip. Negative for sinus pressure, sinus pain and sore throat.    Eyes: Negative.    Respiratory: Negative.  Negative for cough and shortness of breath.    Cardiovascular: Negative.  Negative for chest pain and leg swelling.   Gastrointestinal: Negative.    Endocrine: Negative.    Genitourinary: Negative.    Musculoskeletal: Negative.    Skin: Positive for rash.       Objective:      Physical Exam   Constitutional: She appears well-developed and well-nourished. No distress.   HENT:   Head: Normocephalic and atraumatic.   Right Ear: External ear normal.   Left Ear: External ear normal.   Nose: Nose normal.   Mouth/Throat: Oropharynx is clear and moist. No oropharyngeal exudate.   Sinuses non tender  Mucus clear  Throat clear   Eyes: Conjunctivae are normal. No scleral icterus.   Neck: Normal range of motion. Neck supple. No tracheal deviation present. No thyromegaly present.   Tender to touch R side of neck  Several areas of mild erythema with single vesicle R ant. Neck     Cardiovascular: Normal rate, regular rhythm and intact distal pulses. Exam reveals no gallop and no friction rub.   No murmur heard.  Pulmonary/Chest: Effort normal and breath sounds normal. No stridor. No respiratory distress. She has no wheezes. She has no rales.   Musculoskeletal: She exhibits no edema.   Lymphadenopathy:     She has no cervical adenopathy.   Skin: Rash noted. There is erythema.   R ext ear and area about R ear hypertensive to touch    Vitals reviewed.       Assessment:       1. Herpes zoster without complication    2. Viral illness        Plan:       Herpes zoster without complication  -     valACYclovir (VALTREX) 1000 MG tablet; Take 1 tablet (1,000 mg total) by mouth 3 (three) times daily. for 7 days  Dispense: 21 tablet; Refill: 0  -     amitriptyline (ELAVIL) 10 MG tablet; 2 tabs at bedtime as directed  Dispense: 60 tablet; Refill: 1    Viral illness    discussed otc's  F/u if sx worsen

## 2019-04-02 ENCOUNTER — DOCUMENTATION ONLY (OUTPATIENT)
Dept: FAMILY MEDICINE | Facility: CLINIC | Age: 74
End: 2019-04-02

## 2019-04-02 ENCOUNTER — OFFICE VISIT (OUTPATIENT)
Dept: FAMILY MEDICINE | Facility: CLINIC | Age: 74
End: 2019-04-02
Payer: MEDICARE

## 2019-04-02 VITALS
OXYGEN SATURATION: 97 % | HEIGHT: 62 IN | SYSTOLIC BLOOD PRESSURE: 114 MMHG | BODY MASS INDEX: 28.2 KG/M2 | TEMPERATURE: 99 F | HEART RATE: 86 BPM | WEIGHT: 153.25 LBS | RESPIRATION RATE: 16 BRPM | DIASTOLIC BLOOD PRESSURE: 66 MMHG

## 2019-04-02 DIAGNOSIS — J20.9 ACUTE BRONCHITIS, UNSPECIFIED ORGANISM: ICD-10-CM

## 2019-04-02 DIAGNOSIS — J01.01 ACUTE RECURRENT MAXILLARY SINUSITIS: Primary | ICD-10-CM

## 2019-04-02 DIAGNOSIS — R21 BUTTERFLY RASH: ICD-10-CM

## 2019-04-02 PROCEDURE — 3074F PR MOST RECENT SYSTOLIC BLOOD PRESSURE < 130 MM HG: ICD-10-PCS | Mod: CPTII,S$GLB,, | Performed by: INTERNAL MEDICINE

## 2019-04-02 PROCEDURE — 3074F SYST BP LT 130 MM HG: CPT | Mod: CPTII,S$GLB,, | Performed by: INTERNAL MEDICINE

## 2019-04-02 PROCEDURE — 1101F PR PT FALLS ASSESS DOC 0-1 FALLS W/OUT INJ PAST YR: ICD-10-PCS | Mod: CPTII,S$GLB,, | Performed by: INTERNAL MEDICINE

## 2019-04-02 PROCEDURE — 99213 OFFICE O/P EST LOW 20 MIN: CPT | Mod: S$GLB,,, | Performed by: INTERNAL MEDICINE

## 2019-04-02 PROCEDURE — 3078F DIAST BP <80 MM HG: CPT | Mod: CPTII,S$GLB,, | Performed by: INTERNAL MEDICINE

## 2019-04-02 PROCEDURE — 1101F PT FALLS ASSESS-DOCD LE1/YR: CPT | Mod: CPTII,S$GLB,, | Performed by: INTERNAL MEDICINE

## 2019-04-02 PROCEDURE — 99213 PR OFFICE/OUTPT VISIT, EST, LEVL III, 20-29 MIN: ICD-10-PCS | Mod: S$GLB,,, | Performed by: INTERNAL MEDICINE

## 2019-04-02 PROCEDURE — 3078F PR MOST RECENT DIASTOLIC BLOOD PRESSURE < 80 MM HG: ICD-10-PCS | Mod: CPTII,S$GLB,, | Performed by: INTERNAL MEDICINE

## 2019-04-02 RX ORDER — PREDNISONE 20 MG/1
40 TABLET ORAL DAILY
Qty: 4 TABLET | Refills: 0 | Status: SHIPPED | OUTPATIENT
Start: 2019-04-02 | End: 2019-04-04

## 2019-04-02 RX ORDER — GUAIFENESIN, PSEUDOEPHEDRINE HYDROCHLORIDE 600; 60 MG/1; MG/1
1 TABLET, EXTENDED RELEASE ORAL 2 TIMES DAILY
Qty: 30 TABLET | Refills: 1 | Status: SHIPPED | OUTPATIENT
Start: 2019-04-02 | End: 2019-04-12

## 2019-04-02 NOTE — PROGRESS NOTES
"Subjective:       Patient ID: Kelly Queen is a 74 y.o. female.    Chief Complaint: Sinus Problem; Cough; and Sore Throat    HPI         CHIEF COMPLAINT: Cough(+).  HPI:     ONSET/TIMIN d  ago    DURATION:               Paroxysmal: no .    QUALITY/COURSE:. unchanged    INTENSITY/SEVERITY: Severity is #  8 in morning, 3 now (10 point scale)      The following symptoms/statements are positive if BOLD, negative otherwise.      CONTEXT/WHEN:  Tobacco_use. Smokers_in_home. Seasonal_pattern. Allergies/Hayfever. Sinusitis. Irritant_Exposure(smoke/dust/fumes). Exposure_to_others_with_similar_symptoms.        Similar_problems_in_past.   PAST TREATMENT OR EVALUATION: advil and tylenol.   previous PPD. Recent_previous_chest_x-ray. Recent_antibiotics.  Associated Symptoms:     sputum production: scant. copious. Hemoptysis.  Medical History: Past_pulmonary_infections.  Cardiovascular_disease.chronic_lung_disease.  tuberculosis. Asthma. AIDS. Gastroesophageal_reflux_disease .      Review of Systems   Constitutional: Negative for chills, diaphoresis, fever and unexpected weight change.   HENT: Positive for rhinorrhea, sinus pressure, sinus pain, sneezing and sore throat.    Respiratory: Positive for cough. Negative for shortness of breath and wheezing.    Cardiovascular: Negative for chest pain.   Musculoskeletal: Negative for myalgias.         Objective:      Vitals:    19 1533   BP: 114/66   Pulse: 86   Resp: 16   Temp: 99.3 °F (37.4 °C)   TempSrc: Oral   SpO2: 97%   Weight: 69.5 kg (153 lb 3.5 oz)   Height: 5' 2" (1.575 m)   PainSc: 0-No pain     Physical Exam   Constitutional: She appears well-developed and well-nourished.   Cardiovascular: Normal rate, regular rhythm and normal heart sounds.   Pulmonary/Chest: Effort normal and breath sounds normal.   Abdominal: Soft. There is no tenderness.   Neurological: She is alert.   Skin:   Butterfly rash on face sparing the nasal labial fold   Psychiatric: She has a " normal mood and affect. Her behavior is normal. Thought content normal.   Nursing note and vitals reviewed.        Assessment:       1. Acute recurrent maxillary sinusitis    2. Acute bronchitis, unspecified organism    3. Butterfly rash          Plan:       Acute recurrent maxillary sinusitis  -     predniSONE (DELTASONE) 20 MG tablet; Take 2 tablets (40 mg total) by mouth once daily. for 2 days  Dispense: 4 tablet; Refill: 0  -     pseudoephedrine-guaifenesin  mg (MUCINEX D)  mg per tablet; Take 1 tablet by mouth 2 (two) times daily. for 10 days  Dispense: 30 tablet; Refill: 1    Acute bronchitis, unspecified organism    Butterfly rash  -     VANDANA Screen w/Reflex; Future; Expected date: 04/02/2019      Follow up for if you are not better return in 2 weeks.

## 2019-04-02 NOTE — PATIENT INSTRUCTIONS
If you're still having sinus pain in 5 days call us.    Salt water nasal washings.  Steam treatments.

## 2019-04-03 ENCOUNTER — LAB VISIT (OUTPATIENT)
Dept: LAB | Facility: HOSPITAL | Age: 74
End: 2019-04-03
Attending: INTERNAL MEDICINE
Payer: MEDICARE

## 2019-04-03 DIAGNOSIS — R21 BUTTERFLY RASH: ICD-10-CM

## 2019-04-03 PROCEDURE — 36415 COLL VENOUS BLD VENIPUNCTURE: CPT | Mod: HCNC,PO

## 2019-04-03 PROCEDURE — 86038 ANTINUCLEAR ANTIBODIES: CPT | Mod: HCNC

## 2019-04-04 LAB — ANA SER QL IF: NORMAL

## 2019-04-05 ENCOUNTER — PATIENT MESSAGE (OUTPATIENT)
Dept: FAMILY MEDICINE | Facility: CLINIC | Age: 74
End: 2019-04-05

## 2019-04-11 ENCOUNTER — PATIENT OUTREACH (OUTPATIENT)
Dept: ADMINISTRATIVE | Facility: HOSPITAL | Age: 74
End: 2019-04-11

## 2019-04-22 ENCOUNTER — DOCUMENTATION ONLY (OUTPATIENT)
Dept: FAMILY MEDICINE | Facility: CLINIC | Age: 74
End: 2019-04-22

## 2019-04-22 NOTE — PROGRESS NOTES
Pre-Visit Chart Review  For Appointment Scheduled on 4-25-19    Health Maintenance Due   Topic Date Due    Zoster Vaccine  03/29/2005

## 2019-04-25 ENCOUNTER — LAB VISIT (OUTPATIENT)
Dept: LAB | Facility: HOSPITAL | Age: 74
End: 2019-04-25
Attending: FAMILY MEDICINE
Payer: MEDICARE

## 2019-04-25 ENCOUNTER — OFFICE VISIT (OUTPATIENT)
Dept: FAMILY MEDICINE | Facility: CLINIC | Age: 74
End: 2019-04-25
Attending: FAMILY MEDICINE
Payer: MEDICARE

## 2019-04-25 VITALS
DIASTOLIC BLOOD PRESSURE: 70 MMHG | RESPIRATION RATE: 16 BRPM | HEART RATE: 73 BPM | WEIGHT: 152.31 LBS | SYSTOLIC BLOOD PRESSURE: 138 MMHG | TEMPERATURE: 98 F | HEIGHT: 62 IN | BODY MASS INDEX: 28.03 KG/M2 | OXYGEN SATURATION: 98 %

## 2019-04-25 DIAGNOSIS — I10 ESSENTIAL HYPERTENSION: ICD-10-CM

## 2019-04-25 DIAGNOSIS — I10 GOOD HYPERTENSION CONTROL: ICD-10-CM

## 2019-04-25 DIAGNOSIS — R63.4 WEIGHT LOSS: ICD-10-CM

## 2019-04-25 DIAGNOSIS — Z00.00 ENCOUNTER FOR PREVENTIVE HEALTH EXAMINATION: Primary | ICD-10-CM

## 2019-04-25 DIAGNOSIS — R73.9 HYPERGLYCEMIA: ICD-10-CM

## 2019-04-25 DIAGNOSIS — E78.5 HYPERLIPIDEMIA, UNSPECIFIED HYPERLIPIDEMIA TYPE: ICD-10-CM

## 2019-04-25 DIAGNOSIS — Z00.00 ENCOUNTER FOR PREVENTIVE HEALTH EXAMINATION: ICD-10-CM

## 2019-04-25 DIAGNOSIS — M17.0 PRIMARY OSTEOARTHRITIS OF BOTH KNEES: ICD-10-CM

## 2019-04-25 DIAGNOSIS — N18.30 CHRONIC KIDNEY DISEASE, STAGE III (MODERATE): ICD-10-CM

## 2019-04-25 LAB
ALBUMIN SERPL BCP-MCNC: 4.1 G/DL (ref 3.5–5.2)
ALP SERPL-CCNC: 56 U/L (ref 55–135)
ALT SERPL W/O P-5'-P-CCNC: 13 U/L (ref 10–44)
ANION GAP SERPL CALC-SCNC: 10 MMOL/L (ref 8–16)
AST SERPL-CCNC: 19 U/L (ref 10–40)
BASOPHILS # BLD AUTO: 0.08 K/UL (ref 0–0.2)
BASOPHILS NFR BLD: 1.2 % (ref 0–1.9)
BILIRUB SERPL-MCNC: 0.6 MG/DL (ref 0.1–1)
BUN SERPL-MCNC: 18 MG/DL (ref 8–23)
CALCIUM SERPL-MCNC: 9.9 MG/DL (ref 8.7–10.5)
CHLORIDE SERPL-SCNC: 106 MMOL/L (ref 95–110)
CHOLEST SERPL-MCNC: 234 MG/DL (ref 120–199)
CHOLEST/HDLC SERPL: 4 {RATIO} (ref 2–5)
CO2 SERPL-SCNC: 27 MMOL/L (ref 23–29)
CREAT SERPL-MCNC: 0.9 MG/DL (ref 0.5–1.4)
DIFFERENTIAL METHOD: NORMAL
EOSINOPHIL # BLD AUTO: 0.5 K/UL (ref 0–0.5)
EOSINOPHIL NFR BLD: 6.6 % (ref 0–8)
ERYTHROCYTE [DISTWIDTH] IN BLOOD BY AUTOMATED COUNT: 13.9 % (ref 11.5–14.5)
EST. GFR  (AFRICAN AMERICAN): >60 ML/MIN/1.73 M^2
EST. GFR  (NON AFRICAN AMERICAN): >60 ML/MIN/1.73 M^2
ESTIMATED AVG GLUCOSE: 126 MG/DL (ref 68–131)
GLUCOSE SERPL-MCNC: 97 MG/DL (ref 70–110)
HBA1C MFR BLD HPLC: 6 % (ref 4–5.6)
HCT VFR BLD AUTO: 43.8 % (ref 37–48.5)
HDLC SERPL-MCNC: 59 MG/DL (ref 40–75)
HDLC SERPL: 25.2 % (ref 20–50)
HGB BLD-MCNC: 14.1 G/DL (ref 12–16)
IMM GRANULOCYTES # BLD AUTO: 0.02 K/UL (ref 0–0.04)
IMM GRANULOCYTES NFR BLD AUTO: 0.3 % (ref 0–0.5)
LDLC SERPL CALC-MCNC: 153.4 MG/DL (ref 63–159)
LYMPHOCYTES # BLD AUTO: 2.3 K/UL (ref 1–4.8)
LYMPHOCYTES NFR BLD: 32.4 % (ref 18–48)
MCH RBC QN AUTO: 29.9 PG (ref 27–31)
MCHC RBC AUTO-ENTMCNC: 32.2 G/DL (ref 32–36)
MCV RBC AUTO: 93 FL (ref 82–98)
MONOCYTES # BLD AUTO: 0.8 K/UL (ref 0.3–1)
MONOCYTES NFR BLD: 10.8 % (ref 4–15)
NEUTROPHILS # BLD AUTO: 3.4 K/UL (ref 1.8–7.7)
NEUTROPHILS NFR BLD: 48.7 % (ref 38–73)
NONHDLC SERPL-MCNC: 175 MG/DL
NRBC BLD-RTO: 0 /100 WBC
PLATELET # BLD AUTO: 311 K/UL (ref 150–350)
PMV BLD AUTO: 10.8 FL (ref 9.2–12.9)
POTASSIUM SERPL-SCNC: 4.6 MMOL/L (ref 3.5–5.1)
PROT SERPL-MCNC: 7.1 G/DL (ref 6–8.4)
RBC # BLD AUTO: 4.71 M/UL (ref 4–5.4)
SODIUM SERPL-SCNC: 143 MMOL/L (ref 136–145)
TRIGL SERPL-MCNC: 108 MG/DL (ref 30–150)
TSH SERPL DL<=0.005 MIU/L-ACNC: 0.66 UIU/ML (ref 0.4–4)
WBC # BLD AUTO: 6.94 K/UL (ref 3.9–12.7)

## 2019-04-25 PROCEDURE — 99999 PR PBB SHADOW E&M-EST. PATIENT-LVL III: CPT | Mod: PBBFAC,HCNC,, | Performed by: FAMILY MEDICINE

## 2019-04-25 PROCEDURE — 36415 COLL VENOUS BLD VENIPUNCTURE: CPT | Mod: HCNC,PO

## 2019-04-25 PROCEDURE — 99397 PR PREVENTIVE VISIT,EST,65 & OVER: ICD-10-PCS | Mod: HCNC,S$GLB,, | Performed by: FAMILY MEDICINE

## 2019-04-25 PROCEDURE — 3078F DIAST BP <80 MM HG: CPT | Mod: HCNC,CPTII,S$GLB, | Performed by: FAMILY MEDICINE

## 2019-04-25 PROCEDURE — 3075F PR MOST RECENT SYSTOLIC BLOOD PRESS GE 130-139MM HG: ICD-10-PCS | Mod: HCNC,CPTII,S$GLB, | Performed by: FAMILY MEDICINE

## 2019-04-25 PROCEDURE — 80053 COMPREHEN METABOLIC PANEL: CPT | Mod: HCNC

## 2019-04-25 PROCEDURE — 3078F PR MOST RECENT DIASTOLIC BLOOD PRESSURE < 80 MM HG: ICD-10-PCS | Mod: HCNC,CPTII,S$GLB, | Performed by: FAMILY MEDICINE

## 2019-04-25 PROCEDURE — 80061 LIPID PANEL: CPT | Mod: HCNC

## 2019-04-25 PROCEDURE — 99397 PER PM REEVAL EST PAT 65+ YR: CPT | Mod: HCNC,S$GLB,, | Performed by: FAMILY MEDICINE

## 2019-04-25 PROCEDURE — 84443 ASSAY THYROID STIM HORMONE: CPT | Mod: HCNC

## 2019-04-25 PROCEDURE — 85025 COMPLETE CBC W/AUTO DIFF WBC: CPT | Mod: HCNC

## 2019-04-25 PROCEDURE — 3075F SYST BP GE 130 - 139MM HG: CPT | Mod: HCNC,CPTII,S$GLB, | Performed by: FAMILY MEDICINE

## 2019-04-25 PROCEDURE — 83036 HEMOGLOBIN GLYCOSYLATED A1C: CPT | Mod: HCNC

## 2019-04-25 PROCEDURE — 99999 PR PBB SHADOW E&M-EST. PATIENT-LVL III: ICD-10-PCS | Mod: PBBFAC,HCNC,, | Performed by: FAMILY MEDICINE

## 2019-04-25 RX ORDER — ACETAMINOPHEN 325 MG/1
650 TABLET ORAL EVERY 6 HOURS PRN
COMMUNITY
End: 2023-07-21 | Stop reason: ALTCHOICE

## 2019-04-25 RX ORDER — GUAIFENESIN AND PSEUDOEPHEDRINE HYDROCHLORIDE 600; 60 MG/1; MG/1
1 TABLET, EXTENDED RELEASE ORAL
Refills: 1 | COMMUNITY
Start: 2019-04-13 | End: 2021-12-01

## 2019-04-25 RX ORDER — LISINOPRIL AND HYDROCHLOROTHIAZIDE 10; 12.5 MG/1; MG/1
TABLET ORAL
Qty: 90 TABLET | Refills: 3 | Status: SHIPPED | OUTPATIENT
Start: 2019-04-25 | End: 2020-04-28 | Stop reason: SDUPTHER

## 2019-04-25 NOTE — PROGRESS NOTES
Subjective:       Patient ID: Kelly Queen is a 74 y.o. female.    Chief Complaint: Follow-up (6m)    74-year-old female with a history of hyperglycemia, her last checkup included a fasting glucose of 126.  Two weeks later she had brought it down to 89 through diet and avoidance of sweets.  An A1c was 6.0 at the time.  She also has a history of hyperlipidemia and declined to take a statin.  She has been on a diet program mostly avoiding sugars and low weight carbs and has dropped 18 lb since she last saw me in October of 2017.  She is fasting today and would like to recheck her lab.  Her main complaint includes arthritis of the knees with bilateral total knee replacements which limits her exercise capacity.    Past Medical History:  No date: Arthritis  2/25/15: Carpal tunnel syndrome      Comment:  left; last injected 2/25/15  11/14/2014: Chronic kidney disease, stage III (moderate)  No date: Hypertension    Past Surgical History:  No date: APPENDECTOMY  3/30/2010: COLONOSCOPY      Comment:  Dr. Prince, ten year recheck  5/21/15: FEMUR FRACTURE SURGERY; Left      Comment:  DONNIE lund (L) distal femur  No date: FRACTURE SURGERY      Comment:  5- femer   No date: HYSTERECTOMY  6/7/13: JOINT REPLACEMENT; Left      Comment:  (L) TKA  No date: OOPHORECTOMY  No date: TONSILLECTOMY    Review of patient's family history indicates:  Problem: Diabetes      Relation: Sister          Age of Onset: (Not Specified)  Problem: Cancer      Relation: Sister          Age of Onset: (Not Specified)          Comment: colon  Problem: Hypertension      Relation: Sister          Age of Onset: (Not Specified)  Problem: Diabetes      Relation: Brother          Age of Onset: (Not Specified)  Problem: Cancer      Relation: Brother          Age of Onset: (Not Specified)          Comment: lung  Problem: Basal cell carcinoma      Relation: Daughter          Age of Onset: (Not Specified)  Problem: Skin cancer      Relation: Daughter           Age of Onset: (Not Specified)          Comment: squnamus   Problem: Hypertension      Relation: Mother          Age of Onset: (Not Specified)  Problem: No Known Problems      Relation: Son          Age of Onset: (Not Specified)  Problem: Diabetes      Relation: Brother          Age of Onset: (Not Specified)  Problem: Heart disease      Relation: Brother          Age of Onset: (Not Specified)    Social History    Tobacco Use      Smoking status: Never Smoker      Smokeless tobacco: Never Used    Alcohol use: No    Drug use: No    Current Outpatient Medications on File Prior to Visit:  acetaminophen (TYLENOL) 325 MG tablet, Take 650 mg by mouth every 6 (six) hours as needed for Pain., Disp: , Rfl:   amitriptyline (ELAVIL) 10 MG tablet, 2 tabs at bedtime as directed (Patient taking differently: Take 10 mg by mouth nightly as needed. 2 tabs at bedtime as directed), Disp: 60 tablet, Rfl: 1  aspirin 81 mg Tab, Take 1 tablet by mouth once daily. , Disp: , Rfl:   biotin 10,000 mcg Cap, Take 1 capsule by mouth once daily., Disp: , Rfl:   cyanocobalamin, vitamin B-12, 1,000 mcg TbSR, Take 1 tablet by mouth once daily., Disp: , Rfl:   fexofenadine (ALLEGRA) 180 MG tablet, Take 180 mg by mouth daily as needed. Every day PRN, Disp: , Rfl:   furosemide (LASIX) 40 MG tablet, Take 40 mg by mouth daily as needed., Disp: , Rfl:   garlic 1,000 mg Cap, Take 1 tablet by mouth once daily. , Disp: , Rfl:   krill/om-3/dha/epa/phospho/ast (MEGARED OMEGA-3 KRILL OIL ORAL), Take 500 mg by mouth once daily., Disp: , Rfl:   MUCINEX D  mg per tablet, Take 1 tablet by mouth as needed. , Disp: , Rfl: 1  multivitamin capsule, Take 1 capsule by mouth once daily. , Disp: , Rfl:   (DISCONTINUED) lisinopril-hydrochlorothiazide (PRINZIDE,ZESTORETIC) 10-12.5 mg per tablet, TAKE 1 TABLET EVERY DAY, Disp: 90 tablet, Rfl: 0  rosuvastatin (CRESTOR) 20 MG tablet, Take 1 tablet (20 mg total) by mouth every evening., Disp: 90 tablet, Rfl:  3  (DISCONTINUED) fish oil-omega-3 fatty acids 300-1,000 mg capsule, Take by mouth once daily., Disp: , Rfl:   (DISCONTINUED) naproxen sodium (ALEVE) 220 MG tablet, Take 1 tablet (220 mg total) by mouth 2 (two) times daily with meals. PRN arthritic pain, Disp: , Rfl:   (DISCONTINUED) valACYclovir (VALTREX) 1000 MG tablet, Take 1 tablet (1,000 mg total) by mouth 3 (three) times daily. for 7 days, Disp: 21 tablet, Rfl: 0    No current facility-administered medications on file prior to visit.         Review of Systems   Constitutional: Negative for chills, diaphoresis, fatigue, fever and unexpected weight change.   HENT: Negative for congestion, ear pain, hearing loss, postnasal drip and sinus pressure.    Eyes: Negative for itching and visual disturbance.   Respiratory: Negative for cough, chest tightness, shortness of breath and wheezing.    Cardiovascular: Negative for chest pain, palpitations and leg swelling.   Gastrointestinal: Negative for abdominal pain, blood in stool, constipation, diarrhea, nausea and vomiting.   Genitourinary: Negative for dysuria, frequency, hematuria, menstrual problem, pelvic pain, vaginal bleeding and vaginal discharge.   Musculoskeletal: Positive for arthralgias. Negative for back pain, joint swelling and myalgias.   Neurological: Negative for dizziness and headaches.   Hematological: Negative for adenopathy.   Psychiatric/Behavioral: Negative for sleep disturbance. The patient is not nervous/anxious.        Objective:      Physical Exam   Constitutional: She is oriented to person, place, and time. She appears well-developed. No distress.   Good blood pressure control  Mildly overweight with a BMI 27.9 she is down 18.1 lb from her October 10, 2017 visit with me at her last physical   HENT:   Head: Normocephalic and atraumatic.   Right Ear: External ear normal.   Left Ear: External ear normal.   Nose: Nose normal.   Mouth/Throat: Oropharynx is clear and moist. No oropharyngeal exudate.    Eyes: Pupils are equal, round, and reactive to light. Conjunctivae and EOM are normal. Right eye exhibits no discharge. Left eye exhibits no discharge. No scleral icterus.   Neck: Normal range of motion. Neck supple. No JVD present. No tracheal deviation present. No thyromegaly present.   Cardiovascular: Normal rate, regular rhythm and normal heart sounds. Exam reveals no gallop and no friction rub.   No murmur heard.  Carotid pulses 2+ no bruit   Pulmonary/Chest: Effort normal and breath sounds normal. No stridor. No respiratory distress. She has no wheezes. She has no rales. She exhibits no tenderness.   Abdominal: Soft. Bowel sounds are normal. She exhibits no distension and no mass. There is no tenderness. There is no rebound and no guarding. No hernia.   Musculoskeletal: Normal range of motion. She exhibits no edema or tenderness.   Lymphadenopathy:     She has no cervical adenopathy.   Neurological: She is alert and oriented to person, place, and time. She has normal reflexes. She displays normal reflexes. No cranial nerve deficit or sensory deficit. She exhibits normal muscle tone.   Skin: Skin is warm and dry. No rash noted. She is not diaphoretic. No erythema.   Psychiatric: She has a normal mood and affect. Her behavior is normal. Judgment and thought content normal.   Nursing note and vitals reviewed.      Assessment:       1. Encounter for preventive health examination    2. Good hypertension control    3. Hyperlipidemia, unspecified hyperlipidemia type    4. Primary osteoarthritis of both knees    5. Chronic kidney disease, stage III (moderate)    6. Weight loss    7. Hyperglycemia    8. Essential hypertension    9. BMI 27.0-27.9,adult        Plan:       1. Encounter for preventive health examination  - Comprehensive metabolic panel; Future  - CBC auto differential; Future  - Lipid panel; Future  - TSH; Future    2. Good hypertension control  Fairly good control with no changes needed, continue weight  loss  - Comprehensive metabolic panel; Future  - CBC auto differential; Future    3. Hyperlipidemia, unspecified hyperlipidemia type  Await lab results  - Comprehensive metabolic panel; Future  - Lipid panel; Future    4. Primary osteoarthritis of both knees  Status post bilateral total knee arthroplasties.    5. Chronic kidney disease, stage III (moderate)  Await lab results    6. Weight loss  Voluntary through diet and limited exercise  - TSH; Future    7. Hyperglycemia  Await lab repeat  - Comprehensive metabolic panel; Future  - Hemoglobin A1c; Future    8. Essential hypertension  - lisinopril-hydrochlorothiazide (PRINZIDE,ZESTORETIC) 10-12.5 mg per tablet; TAKE 1 TABLET EVERY DAY  Dispense: 90 tablet; Refill: 3    9. BMI 27.0-27.9,adult  Making excellent progress with weight loss  The patient's BMI has been recorded in the chart. The patient has been provided educational materials regarding the benefits of attaining and maintaining a normal weight. We will continue to address and follow this issue during follow up visits.

## 2019-04-29 ENCOUNTER — TELEPHONE (OUTPATIENT)
Dept: FAMILY MEDICINE | Facility: CLINIC | Age: 74
End: 2019-04-29

## 2019-04-29 DIAGNOSIS — E78.5 HYPERLIPIDEMIA, UNSPECIFIED HYPERLIPIDEMIA TYPE: Primary | ICD-10-CM

## 2019-04-29 NOTE — TELEPHONE ENCOUNTER
Patient informed of results. She has been taking Misael Red. Since it is not working she will start the Crestor. She has it at home. Please place orders for future labs.

## 2019-04-29 NOTE — TELEPHONE ENCOUNTER
----- Message from Erich Mack MD sent at 4/26/2019  7:29 PM CDT -----  Her chemistry panel, A1c, thyroid level, and blood count all look good.  Cholesterol levels are increasing.  Is she taking the Crestor?    Result sent by e-mail

## 2019-07-29 ENCOUNTER — LAB VISIT (OUTPATIENT)
Dept: LAB | Facility: HOSPITAL | Age: 74
End: 2019-07-29
Attending: FAMILY MEDICINE
Payer: MEDICARE

## 2019-07-29 DIAGNOSIS — E78.5 HYPERLIPIDEMIA, UNSPECIFIED HYPERLIPIDEMIA TYPE: ICD-10-CM

## 2019-07-29 LAB
ALBUMIN SERPL BCP-MCNC: 3.6 G/DL (ref 3.5–5.2)
ALP SERPL-CCNC: 59 U/L (ref 55–135)
ALT SERPL W/O P-5'-P-CCNC: 16 U/L (ref 10–44)
AST SERPL-CCNC: 31 U/L (ref 10–40)
BILIRUB DIRECT SERPL-MCNC: 0.2 MG/DL (ref 0.1–0.3)
BILIRUB SERPL-MCNC: 0.4 MG/DL (ref 0.1–1)
CHOLEST SERPL-MCNC: 129 MG/DL (ref 120–199)
CHOLEST/HDLC SERPL: 2.4 {RATIO} (ref 2–5)
HDLC SERPL-MCNC: 53 MG/DL (ref 40–75)
HDLC SERPL: 41.1 % (ref 20–50)
LDLC SERPL CALC-MCNC: 48.6 MG/DL (ref 63–159)
NONHDLC SERPL-MCNC: 76 MG/DL
PROT SERPL-MCNC: 6.3 G/DL (ref 6–8.4)
TRIGL SERPL-MCNC: 137 MG/DL (ref 30–150)

## 2019-07-29 PROCEDURE — 36415 COLL VENOUS BLD VENIPUNCTURE: CPT | Mod: HCNC,PO

## 2019-07-29 PROCEDURE — 80076 HEPATIC FUNCTION PANEL: CPT | Mod: HCNC

## 2019-07-29 PROCEDURE — 80061 LIPID PANEL: CPT | Mod: HCNC

## 2019-09-16 ENCOUNTER — PES CALL (OUTPATIENT)
Dept: ADMINISTRATIVE | Facility: CLINIC | Age: 74
End: 2019-09-16

## 2019-10-29 ENCOUNTER — TELEPHONE (OUTPATIENT)
Dept: FAMILY MEDICINE | Facility: CLINIC | Age: 74
End: 2019-10-29

## 2019-10-29 NOTE — TELEPHONE ENCOUNTER
Spoke with patient, she wants to come today for her flu shot, I let her know she didn't need an appt.

## 2019-10-29 NOTE — TELEPHONE ENCOUNTER
----- Message from Luzmaria Davis sent at 10/29/2019 11:03 AM CDT -----  Contact: [phone]  Patient requesting to schedule flu shot at [facility].    Please call phone number [phone].

## 2019-11-05 ENCOUNTER — IMMUNIZATION (OUTPATIENT)
Dept: FAMILY MEDICINE | Facility: CLINIC | Age: 74
End: 2019-11-05
Payer: MEDICARE

## 2019-11-05 PROCEDURE — G0008 ADMIN INFLUENZA VIRUS VAC: HCPCS | Mod: HCNC,S$GLB,, | Performed by: FAMILY MEDICINE

## 2019-11-05 PROCEDURE — 90662 FLU VACCINE - HIGH DOSE (65+) PRESERVATIVE FREE IM: ICD-10-PCS | Mod: HCNC,S$GLB,, | Performed by: FAMILY MEDICINE

## 2019-11-05 PROCEDURE — 90662 IIV NO PRSV INCREASED AG IM: CPT | Mod: HCNC,S$GLB,, | Performed by: FAMILY MEDICINE

## 2019-11-05 PROCEDURE — G0008 FLU VACCINE - HIGH DOSE (65+) PRESERVATIVE FREE IM: ICD-10-PCS | Mod: HCNC,S$GLB,, | Performed by: FAMILY MEDICINE

## 2019-11-14 ENCOUNTER — OFFICE VISIT (OUTPATIENT)
Dept: FAMILY MEDICINE | Facility: CLINIC | Age: 74
End: 2019-11-14
Payer: MEDICARE

## 2019-11-14 ENCOUNTER — TELEPHONE (OUTPATIENT)
Dept: FAMILY MEDICINE | Facility: CLINIC | Age: 74
End: 2019-11-14

## 2019-11-14 VITALS
DIASTOLIC BLOOD PRESSURE: 64 MMHG | HEIGHT: 62 IN | TEMPERATURE: 98 F | OXYGEN SATURATION: 96 % | WEIGHT: 170.44 LBS | BODY MASS INDEX: 31.36 KG/M2 | SYSTOLIC BLOOD PRESSURE: 138 MMHG | HEART RATE: 88 BPM

## 2019-11-14 DIAGNOSIS — B97.89 ACUTE VIRAL SINUSITIS: Primary | ICD-10-CM

## 2019-11-14 DIAGNOSIS — J01.90 ACUTE VIRAL SINUSITIS: Primary | ICD-10-CM

## 2019-11-14 PROBLEM — Z79.01 ANTICOAGULANT LONG-TERM USE: Status: RESOLVED | Noted: 2018-10-25 | Resolved: 2019-11-14

## 2019-11-14 PROCEDURE — 99213 PR OFFICE/OUTPT VISIT, EST, LEVL III, 20-29 MIN: ICD-10-PCS | Mod: S$GLB,,, | Performed by: NURSE PRACTITIONER

## 2019-11-14 PROCEDURE — 99213 OFFICE O/P EST LOW 20 MIN: CPT | Mod: S$GLB,,, | Performed by: NURSE PRACTITIONER

## 2019-11-14 RX ORDER — PREDNISONE 5 MG/1
TABLET ORAL
Qty: 21 TABLET | Refills: 0 | Status: SHIPPED | OUTPATIENT
Start: 2019-11-14 | End: 2020-04-28 | Stop reason: ALTCHOICE

## 2019-11-14 NOTE — PATIENT INSTRUCTIONS
Start the prednisone first thing in the morning, take all the tablets (6 first day, 5 second day, 4 third day, 3 forth day, 2 fifth day, 1 last day) with breakfast or 1/2 with breakfast and 1/2 with lunch). Do not use after 2:00 pm or it will keep you awake all night.   Drink plenty of salty fluids and lot of other fluids to rehydrate. May use allegra. Get tussin DM (over the counter cough syrup) and take as directed on the label. Stop the mucinex D (tussin is the same as mucinex, so do not take it with the cough syrup).  Get plenty of rest, tylenol for fever and aches.

## 2019-11-14 NOTE — TELEPHONE ENCOUNTER
----- Message from Sandra Cabrera sent at 11/14/2019 12:37 PM CST -----  Contact: pt [phone]  Same Day Appt patient called and asked if you will be able to see her today. For possible sinus infection.

## 2019-11-14 NOTE — PROGRESS NOTES
Subjective:       Patient ID: Kelly Queen is a 74 y.o. female.    Chief Complaint: Nasal Congestion; Chills; Fever; Cough; and Generalized Body Aches  New patient to me, usually seen in [facility] by Dr. Mack.   Sinusitis   This is a new problem. The current episode started in the past 7 days (started 2 days ago). The problem has been gradually worsening since onset. There has been no fever. Associated symptoms include chills, coughing, headaches, sinus pressure and a sore throat. Pertinent negatives include no ear pain. (Myalgia, fatigue) Treatments tried: mucinex and mucinex D. The treatment provided no relief.     Review of Systems   Constitutional: Positive for chills.   HENT: Positive for sinus pressure and sore throat. Negative for ear pain.    Respiratory: Positive for cough.    Neurological: Positive for headaches.       Objective:      Physical Exam   Constitutional: She is oriented to person, place, and time. She appears well-developed and well-nourished. No distress.   HENT:   Head: Normocephalic and atraumatic.   Right Ear: External ear normal.   Left Ear: External ear normal.   Mouth/Throat: Oropharynx is clear and moist. No oropharyngeal exudate.   TMs pearly grey with light reflex bilaterally.      Eyes: Conjunctivae are normal. Right eye exhibits no discharge. Left eye exhibits no discharge. No scleral icterus.   Neck: Normal range of motion. Neck supple.   Cardiovascular: Normal rate, regular rhythm and normal heart sounds. Exam reveals no gallop and no friction rub.   No murmur heard.  Pulmonary/Chest: Effort normal and breath sounds normal. No stridor. No respiratory distress. She has no wheezes. She has no rales.   Musculoskeletal: She exhibits no edema.   Lymphadenopathy:     She has no cervical adenopathy.   Neurological: She is alert and oriented to person, place, and time.   Skin: Skin is warm and dry. No rash noted. She is not diaphoretic. No pallor.   Psychiatric: She  has a normal mood and affect. Her behavior is normal.   Nursing note and vitals reviewed.      Assessment:       1. Acute viral sinusitis        Plan:     Acute viral sinusitis  -     predniSONE (DELTASONE) 5 MG tablet; 6 tabs the first day, then 5 the next, then 4, 3, 2, 1  Dispense: 21 tablet; Refill: 0        Start the prednisone first thing in the morning, take all the tablets (6 first day, 5 second day, 4 third day, 3 forth day, 2 fifth day, 1 last day) with breakfast or 1/2 with breakfast and 1/2 with lunch). Do not use after 2:00 pm or it will keep you awake all night.   Drink plenty of salty fluids and lot of other fluids to rehydrate. May use allegra. Get tussin DM (over the counter cough syrup) and take as directed on the label. Stop the mucinex D (tussin is the same as mucinex, so do not take it with the cough syrup).

## 2020-01-23 ENCOUNTER — PES CALL (OUTPATIENT)
Dept: ADMINISTRATIVE | Facility: CLINIC | Age: 75
End: 2020-01-23

## 2020-01-24 DIAGNOSIS — Z12.39 BREAST CANCER SCREENING: ICD-10-CM

## 2020-01-30 DIAGNOSIS — E78.5 DYSLIPIDEMIA: ICD-10-CM

## 2020-01-30 RX ORDER — ISOPROPYL ALCOHOL 70 ML/100ML
1 SWAB TOPICAL
Refills: 0 | Status: CANCELLED | COMMUNITY
Start: 2020-01-30

## 2020-01-30 RX ORDER — DEXTROSE 4 G
TABLET,CHEWABLE ORAL
Refills: 0 | Status: CANCELLED | OUTPATIENT
Start: 2020-01-30 | End: 2021-01-29

## 2020-01-30 RX ORDER — LANCETS
EACH MISCELLANEOUS
Refills: 0 | Status: CANCELLED | OUTPATIENT
Start: 2020-01-30

## 2020-01-30 RX ORDER — ROSUVASTATIN CALCIUM 20 MG/1
20 TABLET, COATED ORAL NIGHTLY
Qty: 90 TABLET | Refills: 1 | Status: SHIPPED | OUTPATIENT
Start: 2020-01-30 | End: 2020-04-28 | Stop reason: SDUPTHER

## 2020-01-30 NOTE — TELEPHONE ENCOUNTER
Pt last seen by dept 11/14/2019,last seen by you 4/25/2019, next appt 4/28/2020. Last labs 4/25/2019

## 2020-01-31 NOTE — TELEPHONE ENCOUNTER
Insurance will not cover diabetic testing supplies for someone who is not diabetic.  Why is she getting this?

## 2020-02-07 ENCOUNTER — TELEPHONE (OUTPATIENT)
Dept: FAMILY MEDICINE | Facility: CLINIC | Age: 75
End: 2020-02-07

## 2020-02-07 NOTE — TELEPHONE ENCOUNTER
Patient is prediabetic- [employer] asking for this-patient not on any diabetic medications- do you want her to have this? If you do I will set it all up to send to [facility].

## 2020-02-07 NOTE — TELEPHONE ENCOUNTER
Patient is not diabetic, [facility] will not cover diabetic testing supplies unless they have changed their policy recently

## 2020-02-07 NOTE — TELEPHONE ENCOUNTER
----- Message from Piotr De La O sent at 2/7/2020  9:22 AM CST -----  Contact: Stacy with [facility]   Type: Needs Medical Advice    Who Called:  Stacy with [facility]   Symptoms (please be specific):    How long has patient had these symptoms:   Pharmacy name and phone #:    [facility] - [place], [place] - [address]  [address] [address]  Phone: [phone] Fax: [phone]      Best Call Back Number: [phone]   Additional Information: accu-chek wesley plus meter, accu chek plus jhonny strip, accu chek sostcilx lancents , pd single use swad, accu chek control solution. New rx   Fax# [phone]

## 2020-03-02 ENCOUNTER — PES CALL (OUTPATIENT)
Dept: ADMINISTRATIVE | Facility: CLINIC | Age: 75
End: 2020-03-02

## 2020-04-27 ENCOUNTER — DOCUMENTATION ONLY (OUTPATIENT)
Dept: FAMILY MEDICINE | Facility: CLINIC | Age: 75
End: 2020-04-27

## 2020-04-27 NOTE — PROGRESS NOTES
Pre-Visit Chart Review  For Appointment Scheduled on 4-28-20    Health Maintenance Due   Topic Date Due    Colonoscopy  03/30/2020

## 2020-04-28 ENCOUNTER — LAB VISIT (OUTPATIENT)
Dept: LAB | Facility: HOSPITAL | Age: 75
End: 2020-04-28
Attending: FAMILY MEDICINE
Payer: MEDICARE

## 2020-04-28 ENCOUNTER — OFFICE VISIT (OUTPATIENT)
Dept: FAMILY MEDICINE | Facility: CLINIC | Age: 75
End: 2020-04-28
Attending: FAMILY MEDICINE
Payer: MEDICARE

## 2020-04-28 VITALS
OXYGEN SATURATION: 98 % | WEIGHT: 156.5 LBS | DIASTOLIC BLOOD PRESSURE: 68 MMHG | TEMPERATURE: 98 F | BODY MASS INDEX: 29.55 KG/M2 | RESPIRATION RATE: 16 BRPM | HEART RATE: 77 BPM | HEIGHT: 61 IN | SYSTOLIC BLOOD PRESSURE: 136 MMHG

## 2020-04-28 DIAGNOSIS — Z00.00 ENCOUNTER FOR PREVENTIVE HEALTH EXAMINATION: Primary | ICD-10-CM

## 2020-04-28 DIAGNOSIS — N18.30 CHRONIC KIDNEY DISEASE, STAGE III (MODERATE): ICD-10-CM

## 2020-04-28 DIAGNOSIS — R73.03 PREDIABETES: ICD-10-CM

## 2020-04-28 DIAGNOSIS — Z00.00 ENCOUNTER FOR PREVENTIVE HEALTH EXAMINATION: ICD-10-CM

## 2020-04-28 DIAGNOSIS — I10 ESSENTIAL HYPERTENSION: ICD-10-CM

## 2020-04-28 DIAGNOSIS — E78.5 DYSLIPIDEMIA: ICD-10-CM

## 2020-04-28 DIAGNOSIS — M19.90 ARTHRITIS: ICD-10-CM

## 2020-04-28 LAB
ALBUMIN SERPL BCP-MCNC: 4.1 G/DL (ref 3.5–5.2)
ALP SERPL-CCNC: 55 U/L (ref 55–135)
ALT SERPL W/O P-5'-P-CCNC: 15 U/L (ref 10–44)
ANION GAP SERPL CALC-SCNC: 8 MMOL/L (ref 8–16)
AST SERPL-CCNC: 19 U/L (ref 10–40)
BASOPHILS # BLD AUTO: 0.06 K/UL (ref 0–0.2)
BASOPHILS NFR BLD: 0.8 % (ref 0–1.9)
BILIRUB SERPL-MCNC: 0.5 MG/DL (ref 0.1–1)
BUN SERPL-MCNC: 22 MG/DL (ref 8–23)
CALCIUM SERPL-MCNC: 9.7 MG/DL (ref 8.7–10.5)
CHLORIDE SERPL-SCNC: 107 MMOL/L (ref 95–110)
CHOLEST SERPL-MCNC: 163 MG/DL (ref 120–199)
CHOLEST/HDLC SERPL: 2.7 {RATIO} (ref 2–5)
CO2 SERPL-SCNC: 28 MMOL/L (ref 23–29)
CREAT SERPL-MCNC: 0.9 MG/DL (ref 0.5–1.4)
DIFFERENTIAL METHOD: ABNORMAL
EOSINOPHIL # BLD AUTO: 0.5 K/UL (ref 0–0.5)
EOSINOPHIL NFR BLD: 6.7 % (ref 0–8)
ERYTHROCYTE [DISTWIDTH] IN BLOOD BY AUTOMATED COUNT: 13.6 % (ref 11.5–14.5)
EST. GFR  (AFRICAN AMERICAN): >60 ML/MIN/1.73 M^2
EST. GFR  (NON AFRICAN AMERICAN): >60 ML/MIN/1.73 M^2
ESTIMATED AVG GLUCOSE: 120 MG/DL (ref 68–131)
GLUCOSE SERPL-MCNC: 97 MG/DL (ref 70–110)
HBA1C MFR BLD HPLC: 5.8 % (ref 4–5.6)
HCT VFR BLD AUTO: 45.1 % (ref 37–48.5)
HDLC SERPL-MCNC: 61 MG/DL (ref 40–75)
HDLC SERPL: 37.4 % (ref 20–50)
HGB BLD-MCNC: 14.3 G/DL (ref 12–16)
IMM GRANULOCYTES # BLD AUTO: 0.01 K/UL (ref 0–0.04)
IMM GRANULOCYTES NFR BLD AUTO: 0.1 % (ref 0–0.5)
LDLC SERPL CALC-MCNC: 82.6 MG/DL (ref 63–159)
LYMPHOCYTES # BLD AUTO: 2.4 K/UL (ref 1–4.8)
LYMPHOCYTES NFR BLD: 32.4 % (ref 18–48)
MCH RBC QN AUTO: 30.6 PG (ref 27–31)
MCHC RBC AUTO-ENTMCNC: 31.7 G/DL (ref 32–36)
MCV RBC AUTO: 97 FL (ref 82–98)
MONOCYTES # BLD AUTO: 0.6 K/UL (ref 0.3–1)
MONOCYTES NFR BLD: 7.5 % (ref 4–15)
NEUTROPHILS # BLD AUTO: 3.9 K/UL (ref 1.8–7.7)
NEUTROPHILS NFR BLD: 52.5 % (ref 38–73)
NONHDLC SERPL-MCNC: 102 MG/DL
NRBC BLD-RTO: 0 /100 WBC
PLATELET # BLD AUTO: 254 K/UL (ref 150–350)
PMV BLD AUTO: 11.4 FL (ref 9.2–12.9)
POTASSIUM SERPL-SCNC: 4.7 MMOL/L (ref 3.5–5.1)
PROT SERPL-MCNC: 7 G/DL (ref 6–8.4)
RBC # BLD AUTO: 4.67 M/UL (ref 4–5.4)
SODIUM SERPL-SCNC: 143 MMOL/L (ref 136–145)
TRIGL SERPL-MCNC: 97 MG/DL (ref 30–150)
WBC # BLD AUTO: 7.49 K/UL (ref 3.9–12.7)

## 2020-04-28 PROCEDURE — 85025 COMPLETE CBC W/AUTO DIFF WBC: CPT | Mod: HCNC

## 2020-04-28 PROCEDURE — 99999 PR PBB SHADOW E&M-EST. PATIENT-LVL III: ICD-10-PCS | Mod: PBBFAC,HCNC,, | Performed by: FAMILY MEDICINE

## 2020-04-28 PROCEDURE — 99397 PER PM REEVAL EST PAT 65+ YR: CPT | Mod: HCNC,S$GLB,, | Performed by: FAMILY MEDICINE

## 2020-04-28 PROCEDURE — 99999 PR PBB SHADOW E&M-EST. PATIENT-LVL III: CPT | Mod: PBBFAC,HCNC,, | Performed by: FAMILY MEDICINE

## 2020-04-28 PROCEDURE — 36415 COLL VENOUS BLD VENIPUNCTURE: CPT | Mod: HCNC,PO

## 2020-04-28 PROCEDURE — 3075F SYST BP GE 130 - 139MM HG: CPT | Mod: HCNC,CPTII,S$GLB, | Performed by: FAMILY MEDICINE

## 2020-04-28 PROCEDURE — 80053 COMPREHEN METABOLIC PANEL: CPT | Mod: HCNC

## 2020-04-28 PROCEDURE — 99499 RISK ADDL DX/OHS AUDIT: ICD-10-PCS | Mod: S$GLB,,, | Performed by: FAMILY MEDICINE

## 2020-04-28 PROCEDURE — 80061 LIPID PANEL: CPT | Mod: HCNC

## 2020-04-28 PROCEDURE — 99397 PR PREVENTIVE VISIT,EST,65 & OVER: ICD-10-PCS | Mod: HCNC,S$GLB,, | Performed by: FAMILY MEDICINE

## 2020-04-28 PROCEDURE — 3078F PR MOST RECENT DIASTOLIC BLOOD PRESSURE < 80 MM HG: ICD-10-PCS | Mod: HCNC,CPTII,S$GLB, | Performed by: FAMILY MEDICINE

## 2020-04-28 PROCEDURE — 83036 HEMOGLOBIN GLYCOSYLATED A1C: CPT | Mod: HCNC

## 2020-04-28 PROCEDURE — 3075F PR MOST RECENT SYSTOLIC BLOOD PRESS GE 130-139MM HG: ICD-10-PCS | Mod: HCNC,CPTII,S$GLB, | Performed by: FAMILY MEDICINE

## 2020-04-28 PROCEDURE — 3078F DIAST BP <80 MM HG: CPT | Mod: HCNC,CPTII,S$GLB, | Performed by: FAMILY MEDICINE

## 2020-04-28 PROCEDURE — 99499 UNLISTED E&M SERVICE: CPT | Mod: S$GLB,,, | Performed by: FAMILY MEDICINE

## 2020-04-28 RX ORDER — BIOTIN 1000 MCG
1 TABLET,CHEWABLE ORAL DAILY
Status: ON HOLD | COMMUNITY

## 2020-04-28 RX ORDER — LISINOPRIL AND HYDROCHLOROTHIAZIDE 10; 12.5 MG/1; MG/1
TABLET ORAL
Qty: 90 TABLET | Refills: 3 | Status: SHIPPED | OUTPATIENT
Start: 2020-04-28 | End: 2021-04-24 | Stop reason: SDUPTHER

## 2020-04-28 RX ORDER — UBIDECARENONE 75 MG
500 CAPSULE ORAL DAILY
COMMUNITY
End: 2023-07-21 | Stop reason: ALTCHOICE

## 2020-04-28 RX ORDER — ROSUVASTATIN CALCIUM 20 MG/1
20 TABLET, COATED ORAL NIGHTLY
Qty: 90 TABLET | Refills: 3 | Status: SHIPPED | OUTPATIENT
Start: 2020-04-28 | End: 2021-08-27

## 2020-04-28 RX ORDER — ASCORBIC ACID 500 MG
500 TABLET ORAL DAILY
COMMUNITY
End: 2021-12-01

## 2020-04-28 NOTE — PROGRESS NOTES
Subjective:       Patient ID: Kelly Queen is a 75 y.o. female.    Chief Complaint: Annual Exam    75-year-old female comes in for physical exam and follow-up on multiple medical problems.  She is fasting for lab today and has no active complaints at this time.  History includes hypertension, stable stage 3 chronic kidney disease, osteoarthritis of the knees, hyperlipidemia, and prediabetes.    Past Medical History:  No date: Arthritis  2/25/15: Carpal tunnel syndrome      Comment:  left; last injected 2/25/15  11/14/2014: Chronic kidney disease, stage III (moderate)  No date: Hypertension    Past Surgical History:  No date: APPENDECTOMY  3/30/2010: COLONOSCOPY      Comment:  Dr. Prince, ten year recheck  5/21/15: FEMUR FRACTURE SURGERY; Left      Comment:  DONNIE lund (L) distal femur  No date: FRACTURE SURGERY      Comment:  5- femer   No date: HYSTERECTOMY  6/7/13: JOINT REPLACEMENT; Left      Comment:  (L) TKA  No date: OOPHORECTOMY  No date: TONSILLECTOMY    Review of patient's family history indicates:  Problem: Diabetes      Relation: Sister          Age of Onset: (Not Specified)  Problem: Cancer      Relation: Sister          Age of Onset: (Not Specified)          Comment: colon  Problem: Hypertension      Relation: Sister          Age of Onset: (Not Specified)  Problem: Diabetes      Relation: Brother          Age of Onset: (Not Specified)  Problem: Cancer      Relation: Brother          Age of Onset: (Not Specified)          Comment: lung  Problem: Basal cell carcinoma      Relation: Daughter          Age of Onset: (Not Specified)  Problem: Skin cancer      Relation: Daughter          Age of Onset: (Not Specified)          Comment: squnamus   Problem: Hypertension      Relation: Mother          Age of Onset: (Not Specified)  Problem: No Known Problems      Relation: Son          Age of Onset: (Not Specified)  Problem: Diabetes      Relation: Brother          Age of Onset: (Not Specified)  Problem:  Heart disease      Relation: Brother          Age of Onset: (Not Specified)    Social History    Tobacco Use      Smoking status: Never Smoker      Smokeless tobacco: Never Used    Alcohol use: No    Drug use: No    Current Outpatient Medications on File Prior to Visit:  acetaminophen (TYLENOL) 325 MG tablet, Take 650 mg by mouth every 6 (six) hours as needed for Pain., Disp: , Rfl:   amitriptyline (ELAVIL) 10 MG tablet, 2 tabs at bedtime as directed (Patient taking differently: Take 10 mg by mouth nightly as needed. 2 tabs at bedtime as directed), Disp: 60 tablet, Rfl: 1  ascorbic acid, vitamin C, (VITAMIN C) 500 MG tablet, Take 500 mg by mouth once daily., Disp: , Rfl:   aspirin 81 mg Tab, Take 1 tablet by mouth once daily. , Disp: , Rfl:   biotin 1,000 mcg Chew, Take 1 tablet by mouth once daily., Disp: , Rfl:   cyanocobalamin (VITAMIN B-12) 500 MCG tablet, Take 500 mcg by mouth once daily., Disp: , Rfl:   fexofenadine (ALLEGRA) 180 MG tablet, Take 180 mg by mouth daily as needed. Every day PRN, Disp: , Rfl:   furosemide (LASIX) 40 MG tablet, Take 40 mg by mouth daily as needed., Disp: , Rfl:   MUCINEX D  mg per tablet, Take 1 tablet by mouth as needed. , Disp: , Rfl: 1  multivitamin capsule, Take 1 capsule by mouth once daily. , Disp: , Rfl:   TURMERIC ORAL, Take 1 tablet by mouth once daily., Disp: , Rfl:   (DISCONTINUED) lisinopril-hydrochlorothiazide (PRINZIDE,ZESTORETIC) 10-12.5 mg per tablet, TAKE 1 TABLET EVERY DAY, Disp: 90 tablet, Rfl: 3  (DISCONTINUED) rosuvastatin (CRESTOR) 20 MG tablet, Take 1 tablet (20 mg total) by mouth every evening., Disp: 90 tablet, Rfl: 1  (DISCONTINUED) biotin 10,000 mcg Cap, Take 1 capsule by mouth once daily., Disp: , Rfl:   (DISCONTINUED) cyanocobalamin, vitamin B-12, 1,000 mcg TbSR, Take 1 tablet by mouth once daily., Disp: , Rfl:   (DISCONTINUED) garlic 1,000 mg Cap, Take 1 tablet by mouth once daily. , Disp: , Rfl:   (DISCONTINUED) krill/om-3/dha/epa/phospho/ast  (MEGARED OMEGA-3 KRILL OIL ORAL), Take 500 mg by mouth once daily., Disp: , Rfl:   (DISCONTINUED) predniSONE (DELTASONE) 5 MG tablet, 6 tabs the first day, then 5 the next, then 4, 3, 2, 1 (Patient not taking: Reported on 4/28/2020), Disp: 21 tablet, Rfl: 0    No current facility-administered medications on file prior to visit.     Shingles Vaccine(1 of 2) due on 03/29/1995-she declines  Colonoscopy due on 03/30/2020-she declines      Review of Systems   Constitutional: Negative for activity change, appetite change, chills, diaphoresis and fever.   HENT: Negative for congestion, postnasal drip, rhinorrhea, sinus pressure and sinus pain.    Eyes: Negative for visual disturbance.   Respiratory: Negative for cough, chest tightness and shortness of breath.    Cardiovascular: Negative for chest pain, palpitations and leg swelling.   Gastrointestinal: Negative for abdominal pain, blood in stool, constipation, diarrhea, nausea and vomiting.   Endocrine: Negative for cold intolerance, heat intolerance, polydipsia and polyuria.   Genitourinary: Negative for dysuria, frequency and hematuria.   Musculoskeletal: Positive for arthralgias. Negative for gait problem and joint swelling.   Skin: Negative for color change and rash.   Neurological: Negative for dizziness, light-headedness and headaches.   Psychiatric/Behavioral: Negative for dysphoric mood and sleep disturbance. The patient is not nervous/anxious.        Objective:      Physical Exam   Constitutional: She is oriented to person, place, and time. She appears well-developed and well-nourished. No distress.   Good blood pressure control  Mildly overweight with a BMI 29.6 she is up 4.2 lb from her last physical April 25, 2019   HENT:   Head: Normocephalic and atraumatic.   Right Ear: External ear normal.   Left Ear: External ear normal.   Nose: Nose normal.   Mouth/Throat: Oropharynx is clear and moist. No oropharyngeal exudate.   Eyes: Pupils are equal, round, and  reactive to light. EOM are normal. No scleral icterus.   Neck: Normal range of motion. Neck supple. No JVD present. No tracheal deviation present. No thyromegaly present.   Cardiovascular: Normal rate, regular rhythm and normal heart sounds. Exam reveals no gallop and no friction rub.   No murmur heard.  Carotid pulses 2+ no bruit   Pulmonary/Chest: Effort normal and breath sounds normal. No stridor. No respiratory distress. She has no wheezes. She has no rales. She exhibits no tenderness.   Abdominal: Soft. Bowel sounds are normal. She exhibits no distension and no mass. There is no tenderness. There is no rebound and no guarding. No hernia.   Musculoskeletal: Normal range of motion. She exhibits no edema or tenderness.   Lymphadenopathy:     She has no cervical adenopathy.   Neurological: She is alert and oriented to person, place, and time. She displays abnormal reflex. A cranial nerve deficit and sensory deficit is present. Coordination abnormal.   Skin: Skin is warm and dry. She is not diaphoretic.   Psychiatric: She has a normal mood and affect. Her behavior is normal. Judgment and thought content normal.   Nursing note and vitals reviewed.      Assessment:       1. Encounter for preventive health examination    2. Essential hypertension    3. Dyslipidemia    4. Chronic kidney disease, stage III (moderate)    5. Prediabetes    6. Arthritis    7. BMI 30.0-30.9,adult        Plan:       1. Encounter for preventive health examination  - Comprehensive metabolic panel; Future  - Lipid panel; Future  - CBC auto differential; Future    2. Essential hypertension  Good control with no changes needed  - Comprehensive metabolic panel; Future  - Lipid panel; Future  - CBC auto differential; Future  - lisinopriL-hydrochlorothiazide (PRINZIDE,ZESTORETIC) 10-12.5 mg per tablet; TAKE 1 TABLET EVERY DAY  Dispense: 90 tablet; Refill: 3    3. Dyslipidemia  Await lab results  - Comprehensive metabolic panel; Future  - Lipid panel;  Future  - rosuvastatin (CRESTOR) 20 MG tablet; Take 1 tablet (20 mg total) by mouth every evening.  Dispense: 90 tablet; Refill: 3    4. Chronic kidney disease, stage III (moderate)  Await lab results  - Comprehensive metabolic panel; Future  - CBC auto differential; Future    5. Prediabetes  Await lab results  - Comprehensive metabolic panel; Future  - Hemoglobin A1c; Future    6. Arthritis  Stable    7. BMI 30.0-30.9,adult

## 2020-05-05 ENCOUNTER — PATIENT MESSAGE (OUTPATIENT)
Dept: ADMINISTRATIVE | Facility: HOSPITAL | Age: 75
End: 2020-05-05

## 2020-06-29 ENCOUNTER — PES CALL (OUTPATIENT)
Dept: ADMINISTRATIVE | Facility: CLINIC | Age: 75
End: 2020-06-29

## 2020-09-14 ENCOUNTER — PES CALL (OUTPATIENT)
Dept: ADMINISTRATIVE | Facility: CLINIC | Age: 75
End: 2020-09-14

## 2020-09-29 ENCOUNTER — PATIENT MESSAGE (OUTPATIENT)
Dept: OTHER | Facility: OTHER | Age: 75
End: 2020-09-29

## 2020-11-11 ENCOUNTER — IMMUNIZATION (OUTPATIENT)
Dept: PRIMARY CARE CLINIC | Facility: CLINIC | Age: 75
End: 2020-11-11
Payer: MEDICARE

## 2020-11-11 PROCEDURE — 90694 FLU VACCINE - QUADRIVALENT - ADJUVANTED: ICD-10-PCS | Mod: S$GLB,,, | Performed by: FAMILY MEDICINE

## 2020-11-11 PROCEDURE — 90694 VACC AIIV4 NO PRSRV 0.5ML IM: CPT | Mod: S$GLB,,, | Performed by: FAMILY MEDICINE

## 2020-11-11 PROCEDURE — G0008 FLU VACCINE - QUADRIVALENT - ADJUVANTED: ICD-10-PCS | Mod: S$GLB,,, | Performed by: FAMILY MEDICINE

## 2020-11-11 PROCEDURE — G0008 ADMIN INFLUENZA VIRUS VAC: HCPCS | Mod: S$GLB,,, | Performed by: FAMILY MEDICINE

## 2020-12-11 ENCOUNTER — PATIENT MESSAGE (OUTPATIENT)
Dept: OTHER | Facility: OTHER | Age: 75
End: 2020-12-11

## 2021-01-22 ENCOUNTER — PATIENT MESSAGE (OUTPATIENT)
Dept: ADMINISTRATIVE | Facility: OTHER | Age: 76
End: 2021-01-22

## 2021-03-22 ENCOUNTER — PES CALL (OUTPATIENT)
Dept: ADMINISTRATIVE | Facility: CLINIC | Age: 76
End: 2021-03-22

## 2021-04-22 ENCOUNTER — PES CALL (OUTPATIENT)
Dept: ADMINISTRATIVE | Facility: CLINIC | Age: 76
End: 2021-04-22

## 2021-04-23 ENCOUNTER — PATIENT MESSAGE (OUTPATIENT)
Dept: FAMILY MEDICINE | Facility: CLINIC | Age: 76
End: 2021-04-23

## 2021-04-23 DIAGNOSIS — I10 ESSENTIAL HYPERTENSION: ICD-10-CM

## 2021-04-26 RX ORDER — LISINOPRIL AND HYDROCHLOROTHIAZIDE 10; 12.5 MG/1; MG/1
TABLET ORAL
Qty: 90 TABLET | Refills: 0 | Status: SHIPPED | OUTPATIENT
Start: 2021-04-26 | End: 2021-08-29

## 2021-05-07 ENCOUNTER — PES CALL (OUTPATIENT)
Dept: ADMINISTRATIVE | Facility: CLINIC | Age: 76
End: 2021-05-07

## 2021-06-03 ENCOUNTER — PATIENT MESSAGE (OUTPATIENT)
Dept: PRIMARY CARE CLINIC | Facility: CLINIC | Age: 76
End: 2021-06-03

## 2021-06-16 ENCOUNTER — PES CALL (OUTPATIENT)
Dept: ADMINISTRATIVE | Facility: CLINIC | Age: 76
End: 2021-06-16

## 2021-06-16 ENCOUNTER — OFFICE VISIT (OUTPATIENT)
Dept: FAMILY MEDICINE | Facility: CLINIC | Age: 76
End: 2021-06-16
Attending: FAMILY MEDICINE
Payer: MEDICARE

## 2021-06-16 VITALS
HEART RATE: 78 BPM | BODY MASS INDEX: 28.97 KG/M2 | SYSTOLIC BLOOD PRESSURE: 130 MMHG | OXYGEN SATURATION: 97 % | WEIGHT: 153.44 LBS | TEMPERATURE: 99 F | DIASTOLIC BLOOD PRESSURE: 60 MMHG | HEIGHT: 61 IN

## 2021-06-16 DIAGNOSIS — R73.03 PREDIABETES: ICD-10-CM

## 2021-06-16 DIAGNOSIS — E78.5 DYSLIPIDEMIA: ICD-10-CM

## 2021-06-16 DIAGNOSIS — H81.10 BPPV (BENIGN PAROXYSMAL POSITIONAL VERTIGO), UNSPECIFIED LATERALITY: ICD-10-CM

## 2021-06-16 DIAGNOSIS — I10 ESSENTIAL HYPERTENSION: ICD-10-CM

## 2021-06-16 DIAGNOSIS — Z00.00 ENCOUNTER FOR PREVENTIVE HEALTH EXAMINATION: Primary | ICD-10-CM

## 2021-06-16 DIAGNOSIS — N18.30 STAGE 3 CHRONIC KIDNEY DISEASE, UNSPECIFIED WHETHER STAGE 3A OR 3B CKD: ICD-10-CM

## 2021-06-16 DIAGNOSIS — M17.0 PRIMARY OSTEOARTHRITIS OF BOTH KNEES: ICD-10-CM

## 2021-06-16 PROCEDURE — 99999 PR PBB SHADOW E&M-EST. PATIENT-LVL IV: ICD-10-PCS | Mod: PBBFAC,,, | Performed by: FAMILY MEDICINE

## 2021-06-16 PROCEDURE — 3288F PR FALLS RISK ASSESSMENT DOCUMENTED: ICD-10-PCS | Mod: CPTII,S$GLB,, | Performed by: FAMILY MEDICINE

## 2021-06-16 PROCEDURE — 3288F FALL RISK ASSESSMENT DOCD: CPT | Mod: CPTII,S$GLB,, | Performed by: FAMILY MEDICINE

## 2021-06-16 PROCEDURE — 1101F PR PT FALLS ASSESS DOC 0-1 FALLS W/OUT INJ PAST YR: ICD-10-PCS | Mod: CPTII,S$GLB,, | Performed by: FAMILY MEDICINE

## 2021-06-16 PROCEDURE — 99499 UNLISTED E&M SERVICE: CPT | Mod: S$GLB,,, | Performed by: FAMILY MEDICINE

## 2021-06-16 PROCEDURE — 1126F AMNT PAIN NOTED NONE PRSNT: CPT | Mod: S$GLB,,, | Performed by: FAMILY MEDICINE

## 2021-06-16 PROCEDURE — 99397 PR PREVENTIVE VISIT,EST,65 & OVER: ICD-10-PCS | Mod: S$GLB,,, | Performed by: FAMILY MEDICINE

## 2021-06-16 PROCEDURE — 99999 PR PBB SHADOW E&M-EST. PATIENT-LVL IV: CPT | Mod: PBBFAC,,, | Performed by: FAMILY MEDICINE

## 2021-06-16 PROCEDURE — 1101F PT FALLS ASSESS-DOCD LE1/YR: CPT | Mod: CPTII,S$GLB,, | Performed by: FAMILY MEDICINE

## 2021-06-16 PROCEDURE — 99499 RISK ADDL DX/OHS AUDIT: ICD-10-PCS | Mod: S$GLB,,, | Performed by: FAMILY MEDICINE

## 2021-06-16 PROCEDURE — 1126F PR PAIN SEVERITY QUANTIFIED, NO PAIN PRESENT: ICD-10-PCS | Mod: S$GLB,,, | Performed by: FAMILY MEDICINE

## 2021-06-16 PROCEDURE — 99397 PER PM REEVAL EST PAT 65+ YR: CPT | Mod: S$GLB,,, | Performed by: FAMILY MEDICINE

## 2021-06-22 ENCOUNTER — LAB VISIT (OUTPATIENT)
Dept: LAB | Facility: HOSPITAL | Age: 76
End: 2021-06-22
Attending: FAMILY MEDICINE
Payer: MEDICARE

## 2021-06-22 DIAGNOSIS — E78.5 DYSLIPIDEMIA: ICD-10-CM

## 2021-06-22 DIAGNOSIS — I10 ESSENTIAL HYPERTENSION: ICD-10-CM

## 2021-06-22 DIAGNOSIS — N18.30 STAGE 3 CHRONIC KIDNEY DISEASE, UNSPECIFIED WHETHER STAGE 3A OR 3B CKD: ICD-10-CM

## 2021-06-22 DIAGNOSIS — Z00.00 ENCOUNTER FOR PREVENTIVE HEALTH EXAMINATION: ICD-10-CM

## 2021-06-22 DIAGNOSIS — R73.03 PREDIABETES: ICD-10-CM

## 2021-06-22 LAB
ALBUMIN SERPL BCP-MCNC: 4.1 G/DL (ref 3.5–5.2)
ALP SERPL-CCNC: 48 U/L (ref 55–135)
ALT SERPL W/O P-5'-P-CCNC: 13 U/L (ref 10–44)
ANION GAP SERPL CALC-SCNC: 12 MMOL/L (ref 8–16)
AST SERPL-CCNC: 21 U/L (ref 10–40)
BASOPHILS # BLD AUTO: 0.07 K/UL (ref 0–0.2)
BASOPHILS NFR BLD: 0.7 % (ref 0–1.9)
BILIRUB SERPL-MCNC: 0.6 MG/DL (ref 0.1–1)
BUN SERPL-MCNC: 14 MG/DL (ref 8–23)
CALCIUM SERPL-MCNC: 9.8 MG/DL (ref 8.7–10.5)
CHLORIDE SERPL-SCNC: 108 MMOL/L (ref 95–110)
CHOLEST SERPL-MCNC: 140 MG/DL (ref 120–199)
CHOLEST/HDLC SERPL: 2.7 {RATIO} (ref 2–5)
CO2 SERPL-SCNC: 24 MMOL/L (ref 23–29)
CREAT SERPL-MCNC: 0.9 MG/DL (ref 0.5–1.4)
DIFFERENTIAL METHOD: ABNORMAL
EOSINOPHIL # BLD AUTO: 0.7 K/UL (ref 0–0.5)
EOSINOPHIL NFR BLD: 6.9 % (ref 0–8)
ERYTHROCYTE [DISTWIDTH] IN BLOOD BY AUTOMATED COUNT: 13 % (ref 11.5–14.5)
EST. GFR  (AFRICAN AMERICAN): >60 ML/MIN/1.73 M^2
EST. GFR  (NON AFRICAN AMERICAN): >60 ML/MIN/1.73 M^2
ESTIMATED AVG GLUCOSE: 126 MG/DL (ref 68–131)
GLUCOSE SERPL-MCNC: 98 MG/DL (ref 70–110)
HBA1C MFR BLD: 6 % (ref 4–5.6)
HCT VFR BLD AUTO: 44.6 % (ref 37–48.5)
HDLC SERPL-MCNC: 52 MG/DL (ref 40–75)
HDLC SERPL: 37.1 % (ref 20–50)
HGB BLD-MCNC: 14.1 G/DL (ref 12–16)
IMM GRANULOCYTES # BLD AUTO: 0.01 K/UL (ref 0–0.04)
IMM GRANULOCYTES NFR BLD AUTO: 0.1 % (ref 0–0.5)
LDLC SERPL CALC-MCNC: 61.8 MG/DL (ref 63–159)
LYMPHOCYTES # BLD AUTO: 3.1 K/UL (ref 1–4.8)
LYMPHOCYTES NFR BLD: 32.5 % (ref 18–48)
MCH RBC QN AUTO: 29.6 PG (ref 27–31)
MCHC RBC AUTO-ENTMCNC: 31.6 G/DL (ref 32–36)
MCV RBC AUTO: 94 FL (ref 82–98)
MONOCYTES # BLD AUTO: 0.7 K/UL (ref 0.3–1)
MONOCYTES NFR BLD: 6.9 % (ref 4–15)
NEUTROPHILS # BLD AUTO: 5 K/UL (ref 1.8–7.7)
NEUTROPHILS NFR BLD: 52.9 % (ref 38–73)
NONHDLC SERPL-MCNC: 88 MG/DL
NRBC BLD-RTO: 0 /100 WBC
PLATELET # BLD AUTO: 254 K/UL (ref 150–450)
PMV BLD AUTO: 11.7 FL (ref 9.2–12.9)
POTASSIUM SERPL-SCNC: 4.1 MMOL/L (ref 3.5–5.1)
PROT SERPL-MCNC: 6.9 G/DL (ref 6–8.4)
RBC # BLD AUTO: 4.76 M/UL (ref 4–5.4)
SODIUM SERPL-SCNC: 144 MMOL/L (ref 136–145)
TRIGL SERPL-MCNC: 131 MG/DL (ref 30–150)
WBC # BLD AUTO: 9.39 K/UL (ref 3.9–12.7)

## 2021-06-22 PROCEDURE — 85025 COMPLETE CBC W/AUTO DIFF WBC: CPT | Performed by: FAMILY MEDICINE

## 2021-06-22 PROCEDURE — 80053 COMPREHEN METABOLIC PANEL: CPT | Performed by: FAMILY MEDICINE

## 2021-06-22 PROCEDURE — 80061 LIPID PANEL: CPT | Performed by: FAMILY MEDICINE

## 2021-06-22 PROCEDURE — 36415 COLL VENOUS BLD VENIPUNCTURE: CPT | Mod: PO | Performed by: FAMILY MEDICINE

## 2021-06-22 PROCEDURE — 83036 HEMOGLOBIN GLYCOSYLATED A1C: CPT | Performed by: FAMILY MEDICINE

## 2021-07-13 ENCOUNTER — TELEPHONE (OUTPATIENT)
Dept: FAMILY MEDICINE | Facility: CLINIC | Age: 76
End: 2021-07-13

## 2021-08-25 ENCOUNTER — PES CALL (OUTPATIENT)
Dept: ADMINISTRATIVE | Facility: CLINIC | Age: 76
End: 2021-08-25

## 2021-08-26 DIAGNOSIS — E78.5 DYSLIPIDEMIA: ICD-10-CM

## 2021-08-26 DIAGNOSIS — I10 ESSENTIAL HYPERTENSION: ICD-10-CM

## 2021-08-27 RX ORDER — ROSUVASTATIN CALCIUM 20 MG/1
TABLET, COATED ORAL
Qty: 90 TABLET | Refills: 0 | Status: SHIPPED | OUTPATIENT
Start: 2021-08-27 | End: 2021-11-03

## 2021-08-29 RX ORDER — LISINOPRIL AND HYDROCHLOROTHIAZIDE 10; 12.5 MG/1; MG/1
TABLET ORAL
Qty: 90 TABLET | Refills: 1 | Status: SHIPPED | OUTPATIENT
Start: 2021-08-29 | End: 2022-01-23

## 2021-10-28 ENCOUNTER — IMMUNIZATION (OUTPATIENT)
Dept: FAMILY MEDICINE | Facility: CLINIC | Age: 76
End: 2021-10-28
Payer: MEDICARE

## 2021-10-28 PROCEDURE — G0008 ADMIN INFLUENZA VIRUS VAC: HCPCS | Mod: HCNC,S$GLB,, | Performed by: INTERNAL MEDICINE

## 2021-10-28 PROCEDURE — 90694 FLU VACCINE - QUADRIVALENT - ADJUVANTED: ICD-10-PCS | Mod: HCNC,S$GLB,, | Performed by: INTERNAL MEDICINE

## 2021-10-28 PROCEDURE — G0008 FLU VACCINE - QUADRIVALENT - ADJUVANTED: ICD-10-PCS | Mod: HCNC,S$GLB,, | Performed by: INTERNAL MEDICINE

## 2021-10-28 PROCEDURE — 90694 VACC AIIV4 NO PRSRV 0.5ML IM: CPT | Mod: HCNC,S$GLB,, | Performed by: INTERNAL MEDICINE

## 2021-11-02 DIAGNOSIS — E78.5 DYSLIPIDEMIA: ICD-10-CM

## 2021-11-03 RX ORDER — ROSUVASTATIN CALCIUM 20 MG/1
TABLET, COATED ORAL
Qty: 90 TABLET | Refills: 0 | Status: SHIPPED | OUTPATIENT
Start: 2021-11-03 | End: 2022-01-27 | Stop reason: SDUPTHER

## 2021-12-01 ENCOUNTER — OFFICE VISIT (OUTPATIENT)
Dept: FAMILY MEDICINE | Facility: CLINIC | Age: 76
End: 2021-12-01
Payer: MEDICARE

## 2021-12-01 VITALS
BODY MASS INDEX: 29.13 KG/M2 | WEIGHT: 154.31 LBS | HEART RATE: 82 BPM | SYSTOLIC BLOOD PRESSURE: 122 MMHG | OXYGEN SATURATION: 97 % | DIASTOLIC BLOOD PRESSURE: 54 MMHG | HEIGHT: 61 IN

## 2021-12-01 DIAGNOSIS — B02.9 HERPES ZOSTER WITHOUT COMPLICATION: Primary | ICD-10-CM

## 2021-12-01 PROCEDURE — 99214 OFFICE O/P EST MOD 30 MIN: CPT | Mod: HCNC,S$GLB,, | Performed by: FAMILY MEDICINE

## 2021-12-01 PROCEDURE — 99999 PR PBB SHADOW E&M-EST. PATIENT-LVL IV: CPT | Mod: PBBFAC,HCNC,, | Performed by: FAMILY MEDICINE

## 2021-12-01 PROCEDURE — 99214 PR OFFICE/OUTPT VISIT, EST, LEVL IV, 30-39 MIN: ICD-10-PCS | Mod: HCNC,S$GLB,, | Performed by: FAMILY MEDICINE

## 2021-12-01 PROCEDURE — 99999 PR PBB SHADOW E&M-EST. PATIENT-LVL IV: ICD-10-PCS | Mod: PBBFAC,HCNC,, | Performed by: FAMILY MEDICINE

## 2021-12-01 RX ORDER — VALACYCLOVIR HYDROCHLORIDE 1 G/1
1000 TABLET, FILM COATED ORAL 3 TIMES DAILY
Qty: 21 TABLET | Refills: 0 | Status: SHIPPED | OUTPATIENT
Start: 2021-12-01 | End: 2021-12-08

## 2021-12-01 RX ORDER — METHYLPREDNISOLONE 4 MG/1
TABLET ORAL
Qty: 1 EACH | Refills: 0 | Status: SHIPPED | OUTPATIENT
Start: 2021-12-01 | End: 2022-06-09 | Stop reason: ALTCHOICE

## 2022-01-09 ENCOUNTER — OFFICE VISIT (OUTPATIENT)
Dept: URGENT CARE | Facility: CLINIC | Age: 77
End: 2022-01-09
Payer: MEDICARE

## 2022-01-09 VITALS
HEART RATE: 92 BPM | HEIGHT: 62 IN | OXYGEN SATURATION: 96 % | RESPIRATION RATE: 16 BRPM | SYSTOLIC BLOOD PRESSURE: 152 MMHG | TEMPERATURE: 98 F | DIASTOLIC BLOOD PRESSURE: 77 MMHG | BODY MASS INDEX: 27.6 KG/M2 | WEIGHT: 150 LBS

## 2022-01-09 DIAGNOSIS — J34.9 SINUS PROBLEM: Primary | ICD-10-CM

## 2022-01-09 DIAGNOSIS — U07.1 COVID-19 VIRUS DETECTED: ICD-10-CM

## 2022-01-09 DIAGNOSIS — U07.1 COVID-19: ICD-10-CM

## 2022-01-09 LAB
CTP QC/QA: YES
SARS-COV-2 RDRP RESP QL NAA+PROBE: POSITIVE

## 2022-01-09 PROCEDURE — 3077F SYST BP >= 140 MM HG: CPT | Mod: CPTII,S$GLB,, | Performed by: PHYSICIAN ASSISTANT

## 2022-01-09 PROCEDURE — 99213 OFFICE O/P EST LOW 20 MIN: CPT | Mod: S$GLB,,, | Performed by: PHYSICIAN ASSISTANT

## 2022-01-09 PROCEDURE — 1160F RVW MEDS BY RX/DR IN RCRD: CPT | Mod: CPTII,S$GLB,, | Performed by: PHYSICIAN ASSISTANT

## 2022-01-09 PROCEDURE — 1159F PR MEDICATION LIST DOCUMENTED IN MEDICAL RECORD: ICD-10-PCS | Mod: CPTII,S$GLB,, | Performed by: PHYSICIAN ASSISTANT

## 2022-01-09 PROCEDURE — 99213 PR OFFICE/OUTPT VISIT, EST, LEVL III, 20-29 MIN: ICD-10-PCS | Mod: S$GLB,,, | Performed by: PHYSICIAN ASSISTANT

## 2022-01-09 PROCEDURE — 3078F PR MOST RECENT DIASTOLIC BLOOD PRESSURE < 80 MM HG: ICD-10-PCS | Mod: CPTII,S$GLB,, | Performed by: PHYSICIAN ASSISTANT

## 2022-01-09 PROCEDURE — 3077F PR MOST RECENT SYSTOLIC BLOOD PRESSURE >= 140 MM HG: ICD-10-PCS | Mod: CPTII,S$GLB,, | Performed by: PHYSICIAN ASSISTANT

## 2022-01-09 PROCEDURE — U0002: ICD-10-PCS | Mod: QW,CR,S$GLB, | Performed by: PHYSICIAN ASSISTANT

## 2022-01-09 PROCEDURE — 1159F MED LIST DOCD IN RCRD: CPT | Mod: CPTII,S$GLB,, | Performed by: PHYSICIAN ASSISTANT

## 2022-01-09 PROCEDURE — 1160F PR REVIEW ALL MEDS BY PRESCRIBER/CLIN PHARMACIST DOCUMENTED: ICD-10-PCS | Mod: CPTII,S$GLB,, | Performed by: PHYSICIAN ASSISTANT

## 2022-01-09 PROCEDURE — U0002 COVID-19 LAB TEST NON-CDC: HCPCS | Mod: QW,CR,S$GLB, | Performed by: PHYSICIAN ASSISTANT

## 2022-01-09 PROCEDURE — 3078F DIAST BP <80 MM HG: CPT | Mod: CPTII,S$GLB,, | Performed by: PHYSICIAN ASSISTANT

## 2022-01-09 NOTE — PATIENT INSTRUCTIONS
"· Follow up with your primary care if symptoms do not improve, or you may return here at any time.  · If you were referred to a specialist, please follow up with that specialty.  · If you were prescribed antibiotics, please take them to completion.  · If you were prescribed a narcotic or any medication with sedative effects, do not drive or operate heavy equipment or machinery while taking these medications.  · You must understand that you have received treatment at an Urgent Care facility only, and that you may be released before all of your medical problems are known or treated. Urgent Care facilities are not equipped to handle life threatening emergencies. It is recommended that you seek care at an Emergency Department for further evaluation of worsening or concerning symptoms, or possibly life threatening conditions as discussed.                                        If you  smoke, please stop smoking        You have tested POSITIVE for COVID-19 today    Quarantine recommendations based on CDC guidelines:      No symptoms present: You must quarantine for 5 days starting on the day of the positive test.    Symptoms present: You must quarantine for 5 days starting on the day of symptom onset.      AFTER 5 days, if your symptoms have improved and you are fever free, you can return to the community on day 6.   The CDC recommends strict mask use for 5 days following quarantine end.      You cannot test negative to "test out" of quarantine. Per CDC recommendations we do not retest within 90 days of a positive test.      Please refer to CDC website for additional information      Patient Education       COVID-19 Discharge Instructions   About this topic   Coronavirus disease 2019 is also known as COVID-19. It is a viral illness that infects the lungs. It is caused by a virus called SARS-associated coronavirus (SARS-CoV-2).  The signs of COVID-19 most often start a few days after you have been infected. In some people, " it takes longer to show signs. Others never show signs of the infection. You may have a cough, fever, shaking chills and it may be hard to breathe. You may be very tired, have muscle aches, a headache or sore throat. Some people have an upset stomach or loose stools. Others lose their sense of smell or taste. You may not have these signs all the time and they may come and go while you are sick.  The virus spreads easily through droplets when you talk, sneeze, or cough. You can pass the virus to others when you are talking close together, singing, hugging, sharing food, or shaking hands. Doctors believe the germs also survive on surfaces like tables, door handles, and telephones. However, this is not a common way that COVID-19 spreads. Doctors believe you can also spread the infection even if you dont have any symptoms, but they do not know how that happens. This is why getting vaccinated is one of the best ways to keep you healthy and slow the spread of the virus.  Some people have a mild case of COVID-19 and are able to stay at home and away from others until they feel better. Others may need to be in the hospital if they are very sick. Some people with COVID-19 can have some symptoms for weeks or months. People with COVID-19 must isolate themselves. You can start to be around others when your doctor says it is safe to do so.       What care is needed at home?   · Ask your doctor what you need to do when you go home. Make sure you ask questions if you do not understand what the doctor says.  · Drink lots of water, juice, or broth to replace fluids lost from a fever.  · You may use cool mist humidifiers to help ease congestion and coughing.  · Use 2 to 3 pillows to prop yourself up when you lie down to make it easier to breathe and sleep.  · Do not smoke and do not drink beer, wine, and mixed drinks (alcohol).  · To lower the chance of passing the infection to others, get a COVID-19 vaccine after your infection has  resolved.  · If you have not been fully vaccinated:  ? Wear a mask over your mouth and nose if you are around others who are not sick. Cloth masks work best if they have more than one layer of fabric.  ? Wash your hands often.  ? Stay home in a separate room, if possible, away from others. Only go out to get medical care.  ? Use a separate bathroom if possible.  ? Do not make food for others.  What follow-up care is needed?   · Your doctor may ask you to make visits to the office to check on your progress. Be sure to keep these visits. Make sure you wear a mask at these visits.  · If you can, tell the staff you have COVID-19 ahead of time so they can take extra care to stop the disease from spreading.  · It may take a few weeks before your health returns to normal.  What drugs may be needed?   The doctor may order drugs to:  · Help with breathing  · Help with fever  · Help with swelling in your airways and lungs  · Control coughing  · Ease a sore throat  · Help a runny or stuffy nose  Will physical activity be limited?   You may have to limit your physical activity. Talk to your doctor about the right amount of activity for you. If you have been very sick with COVID-19, it can take some time to get your strength back.  Will there be any other care needed?   Doctors do not know how long you can pass the virus on to others after you are sick. This is why it is important to stay in a separate room, if possible, when you are sick. For now, doctors are giving general guidelines for you to follow after you have been sick. Before you go around other people, you should:  · Be fever free for 24 hours without taking any drugs to lower the fever  · Have no symptoms of cough or shortness of breath  · Wait at least 10 days after first having symptoms or your first positive test, and you need to be symptom free as above. Some experts suggest waiting 20 days if you have had a more severe infection.  Talk with your doctor about  getting a COVID-19 vaccine.  What problems could happen?   · Fluid loss. This is dehydration.  · Short-term or long-term lung damage  · Heart problems  · Death  When do I need to call the doctor?   · You are having so much trouble breathing that you can only say one or two words at a time.  · You need to sit upright at all times to be able to breathe and/or cannot lie down.  · You are very confused or cannot stay awake.  · Your lips or skin start to turn blue or grey.  · You think you might be having a medical emergency. Some examples of medical emergencies are:  ? Severe chest pain.  ? Not able to speak or move normally.  · You have trouble breathing when talking or sitting still.  · You have new shortness of breath.  · You become weak or dizzy.  · You have very dark urine or do not pass urine for more than 8 hours.  · You have new or worsening COVID-19 symptoms like:  ? Fever  ? Cough  ? Feeling very tired  ? Shaking chills  ? Headache  ? Trouble swallowing  ? Throwing up  ? Loose stools  ? Reddish purple spots on your fingers or toes  Teach Back: Helping You Understand   The Teach Back Method helps you understand the information we are giving you. After you talk with the staff, tell them in your own words what you learned. This helps to make sure the staff has described each thing clearly. It also helps to explain things that may have been confusing. Before going home, make sure you can do these:  · I can tell you about my condition.  · I can tell you what may help ease my breathing.  · I can tell you what I can do to help avoid passing the infection to others.  · I can tell you what I will do if I have trouble breathing; feel sleepy or confused; or my fingertips, fingernails, skin, or lips are blue.  Where can I learn more?   Centers for Disease Control and Prevention  https://www.cdc.gov/coronavirus/2019-ncov/about/index.html   Centers for Disease Control and  Prevention  https://www.cdc.gov/coronavirus/2019-ncov/hcp/disposition-in-home-patients.html   World Health Organization  https://www.who.int/news-room/q-a-detail/o-m-vsxsqatgfsngm   Last Reviewed Date   2021-10-05  Consumer Information Use and Disclaimer   This information is not specific medical advice and does not replace information you receive from your health care provider. This is only a brief summary of general information. It does NOT include all information about conditions, illnesses, injuries, tests, procedures, treatments, therapies, discharge instructions or life-style choices that may apply to you. You must talk with your health care provider for complete information about your health and treatment options. This information should not be used to decide whether or not to accept your health care providers advice, instructions or recommendations. Only your health care provider has the knowledge and training to provide advice that is right for you.  Copyright   Copyright © 2021 [employer] and its affiliates and/or licensors. All rights reserved.

## 2022-01-09 NOTE — PROGRESS NOTES
"Subjective:       Patient ID: Kelly Queen is a 76 y.o. female.    Vitals:  height is 5' 2" (1.575 m) and weight is 68 kg (150 lb). Her oral temperature is 98.2 °F (36.8 °C). Her blood pressure is 152/77 (abnormal) and her pulse is 92. Her respiration is 16 and oxygen saturation is 96%.     Chief Complaint: Sinus Problem    Pt complains of congestion, cough, sneezing, and sore throat originating on Thursday. Pt states she is vaccinated and has not been exposed to COVID. Pt states she has not taken anything for her symptoms.       Sinus Problem  This is a new problem. The current episode started in the past 7 days. The problem has been gradually worsening since onset. There has been no fever. Her pain is at a severity of 8/10. The pain is severe. Associated symptoms include congestion, coughing, sneezing and a sore throat. Pertinent negatives include no chills, diaphoresis, headaches or shortness of breath. Past treatments include nothing. The treatment provided no relief.       Constitution: Negative for chills, sweating, fatigue and fever.   HENT: Positive for congestion and sore throat.    Neck: Negative for neck stiffness.   Cardiovascular: Negative for chest pain.   Respiratory: Positive for cough. Negative for shortness of breath and wheezing.    Gastrointestinal: Negative for abdominal pain, nausea, vomiting and diarrhea.   Musculoskeletal: Negative for muscle ache.   Allergic/Immunologic: Positive for sneezing.   Neurological: Negative for headaches.       Objective:      Physical Exam   Constitutional: She is oriented to person, place, and time. She appears well-developed and well-nourished.  Non-toxic appearance. She does not appear ill. No distress.   HENT:   Head: Normocephalic and atraumatic.   Ears:   Right Ear: Hearing and external ear normal.   Left Ear: Hearing and external ear normal.   Eyes: Conjunctivae and EOM are normal. Pupils are equal, round, and reactive to light.   Neck: Neck supple. " No neck rigidity present.   Cardiovascular: Normal rate and regular rhythm.   Pulmonary/Chest: Effort normal and breath sounds normal. No respiratory distress. She has no decreased breath sounds. She has no wheezes. She has no rhonchi.   Lungs cta    Comments: Lungs cta    Neurological: She is alert and oriented to person, place, and time.   Skin: Skin is warm, dry and not diaphoretic.   Psychiatric: Her speech is normal and behavior is normal. Mood normal.   Nursing note and vitals reviewed.    Office Visit on 01/09/2022   Component Date Value Ref Range Status    POC Rapid COVID 01/09/2022 Positive* Negative Final     Acceptable 01/09/2022 Yes   Final           Assessment:       1. Sinus problem    2. COVID-19          Plan:       All hx was provided by the pt or available as part of established EMR. The pt past medical hx, family hx, social hx, and current medications were reviewed. Interpretation of diagnostics performed today were discussed. Tx discussed. Quarantine recommendations based on CDC guidelines discussed. Pt may follow up with [facility] for any concern. ER precautions. Pt voiced understanding of all discussed, and agreed.    Some portions of the physical exam were omitted to help reduce chance of potential viral transmission.    Sinus problem  -     POCT COVID-19 Rapid Screening    COVID-19  -     Ambulatory referral/consult to [facility]      Patient Instructions   · Follow up with your primary care if symptoms do not improve, or you may return here at any time.  · If you were referred to a specialist, please follow up with that specialty.  · If you were prescribed antibiotics, please take them to completion.  · If you were prescribed a narcotic or any medication with sedative effects, do not drive or operate heavy equipment or machinery while taking these medications.  · You must understand that you have received treatment at an Urgent Care facility only, and that you may be released  "before all of your medical problems are known or treated. Urgent Care facilities are not equipped to handle life threatening emergencies. It is recommended that you seek care at an Emergency Department for further evaluation of worsening or concerning symptoms, or possibly life threatening conditions as discussed.                                        If you  smoke, please stop smoking        You have tested POSITIVE for COVID-19 today    Quarantine recommendations based on CDC guidelines:      No symptoms present: You must quarantine for 5 days starting on the day of the positive test.    Symptoms present: You must quarantine for 5 days starting on the day of symptom onset.      AFTER 5 days, if your symptoms have improved and you are fever free, you can return to the community on day 6.   The CDC recommends strict mask use for 5 days following quarantine end.      You cannot test negative to "test out" of quarantine. Per CDC recommendations we do not retest within 90 days of a positive test.      Please refer to CDC website for additional information      Patient Education       COVID-19 Discharge Instructions   About this topic   Coronavirus disease 2019 is also known as COVID-19. It is a viral illness that infects the lungs. It is caused by a virus called SARS-associated coronavirus (SARS-CoV-2).  The signs of COVID-19 most often start a few days after you have been infected. In some people, it takes longer to show signs. Others never show signs of the infection. You may have a cough, fever, shaking chills and it may be hard to breathe. You may be very tired, have muscle aches, a headache or sore throat. Some people have an upset stomach or loose stools. Others lose their sense of smell or taste. You may not have these signs all the time and they may come and go while you are sick.  The virus spreads easily through droplets when you talk, sneeze, or cough. You can pass the virus to others when you are talking " close together, singing, hugging, sharing food, or shaking hands. Doctors believe the germs also survive on surfaces like tables, door handles, and telephones. However, this is not a common way that COVID-19 spreads. Doctors believe you can also spread the infection even if you dont have any symptoms, but they do not know how that happens. This is why getting vaccinated is one of the best ways to keep you healthy and slow the spread of the virus.  Some people have a mild case of COVID-19 and are able to stay at home and away from others until they feel better. Others may need to be in the hospital if they are very sick. Some people with COVID-19 can have some symptoms for weeks or months. People with COVID-19 must isolate themselves. You can start to be around others when your doctor says it is safe to do so.       What care is needed at home?   · Ask your doctor what you need to do when you go home. Make sure you ask questions if you do not understand what the doctor says.  · Drink lots of water, juice, or broth to replace fluids lost from a fever.  · You may use cool mist humidifiers to help ease congestion and coughing.  · Use 2 to 3 pillows to prop yourself up when you lie down to make it easier to breathe and sleep.  · Do not smoke and do not drink beer, wine, and mixed drinks (alcohol).  · To lower the chance of passing the infection to others, get a COVID-19 vaccine after your infection has resolved.  · If you have not been fully vaccinated:  ? Wear a mask over your mouth and nose if you are around others who are not sick. Cloth masks work best if they have more than one layer of fabric.  ? Wash your hands often.  ? Stay home in a separate room, if possible, away from others. Only go out to get medical care.  ? Use a separate bathroom if possible.  ? Do not make food for others.  What follow-up care is needed?   · Your doctor may ask you to make visits to the office to check on your progress. Be sure to keep  these visits. Make sure you wear a mask at these visits.  · If you can, tell the staff you have COVID-19 ahead of time so they can take extra care to stop the disease from spreading.  · It may take a few weeks before your health returns to normal.  What drugs may be needed?   The doctor may order drugs to:  · Help with breathing  · Help with fever  · Help with swelling in your airways and lungs  · Control coughing  · Ease a sore throat  · Help a runny or stuffy nose  Will physical activity be limited?   You may have to limit your physical activity. Talk to your doctor about the right amount of activity for you. If you have been very sick with COVID-19, it can take some time to get your strength back.  Will there be any other care needed?   Doctors do not know how long you can pass the virus on to others after you are sick. This is why it is important to stay in a separate room, if possible, when you are sick. For now, doctors are giving general guidelines for you to follow after you have been sick. Before you go around other people, you should:  · Be fever free for 24 hours without taking any drugs to lower the fever  · Have no symptoms of cough or shortness of breath  · Wait at least 10 days after first having symptoms or your first positive test, and you need to be symptom free as above. Some experts suggest waiting 20 days if you have had a more severe infection.  Talk with your doctor about getting a COVID-19 vaccine.  What problems could happen?   · Fluid loss. This is dehydration.  · Short-term or long-term lung damage  · Heart problems  · Death  When do I need to call the doctor?   · You are having so much trouble breathing that you can only say one or two words at a time.  · You need to sit upright at all times to be able to breathe and/or cannot lie down.  · You are very confused or cannot stay awake.  · Your lips or skin start to turn blue or grey.  · You think you might be having a medical emergency. Some  examples of medical emergencies are:  ? Severe chest pain.  ? Not able to speak or move normally.  · You have trouble breathing when talking or sitting still.  · You have new shortness of breath.  · You become weak or dizzy.  · You have very dark urine or do not pass urine for more than 8 hours.  · You have new or worsening COVID-19 symptoms like:  ? Fever  ? Cough  ? Feeling very tired  ? Shaking chills  ? Headache  ? Trouble swallowing  ? Throwing up  ? Loose stools  ? Reddish purple spots on your fingers or toes  Teach Back: Helping You Understand   The Teach Back Method helps you understand the information we are giving you. After you talk with the staff, tell them in your own words what you learned. This helps to make sure the staff has described each thing clearly. It also helps to explain things that may have been confusing. Before going home, make sure you can do these:  · I can tell you about my condition.  · I can tell you what may help ease my breathing.  · I can tell you what I can do to help avoid passing the infection to others.  · I can tell you what I will do if I have trouble breathing; feel sleepy or confused; or my fingertips, fingernails, skin, or lips are blue.  Where can I learn more?   Centers for Disease Control and Prevention  https://www.cdc.gov/coronavirus/2019-ncov/about/index.html   Centers for Disease Control and Prevention  https://www.cdc.gov/coronavirus/2019-ncov/hcp/disposition-in-home-patients.html   World Health Organization  https://www.who.int/news-room/q-a-detail/j-n-amdrsvnnfeake   Last Reviewed Date   2021-10-05  Consumer Information Use and Disclaimer   This information is not specific medical advice and does not replace information you receive from your health care provider. This is only a brief summary of general information. It does NOT include all information about conditions, illnesses, injuries, tests, procedures, treatments, therapies, discharge instructions or  life-style choices that may apply to you. You must talk with your health care provider for complete information about your health and treatment options. This information should not be used to decide whether or not to accept your health care providers advice, instructions or recommendations. Only your health care provider has the knowledge and training to provide advice that is right for you.  Copyright   Copyright © 2021 [employer] and its affiliates and/or licensors. All rights reserved.

## 2022-01-10 ENCOUNTER — PATIENT MESSAGE (OUTPATIENT)
Dept: ADMINISTRATIVE | Facility: OTHER | Age: 77
End: 2022-01-10
Payer: MEDICARE

## 2022-01-10 ENCOUNTER — NURSE TRIAGE (OUTPATIENT)
Dept: ADMINISTRATIVE | Facility: CLINIC | Age: 77
End: 2022-01-10
Payer: MEDICARE

## 2022-01-10 NOTE — TELEPHONE ENCOUNTER
Patient states she is experiencing fatigue, cough and congestion. Denies SOB, diffculty breathing or chest pain/pressure. Advised per protocol of home care recommendation and signs and symptoms to call back if start experiencing. Patient acknowledged.       Reason for Disposition   [1] COVID-19 diagnosed by positive lab test AND [2] mild symptoms (e.g., cough, fever, others) AND [3] no complications or SOB    Additional Information   Negative: SEVERE difficulty breathing (e.g., struggling for each breath, speaks in single words)   Negative: Difficult to awaken or acting confused (e.g., disoriented, slurred speech)   Negative: Bluish (or gray) lips or face now   Negative: Shock suspected (e.g., cold/pale/clammy skin, too weak to stand, low BP, rapid pulse)   Negative: Sounds like a life-threatening emergency to the triager   Negative: [1] COVID-19 exposure AND [2] NO symptoms   Negative: COVID-19 vaccine reaction suspected (e.g., fever, headache, muscle aches) occurring 1 to 3 days after getting vaccine   Negative: COVID-19 vaccine, questions about   Negative: [1] Lives with someone known to have influenza (flu test positive) AND [2] flu-like symptoms (e.g., cough, runny nose, sore throat, SOB; with or without fever)   Negative: [1] Adult with possible COVID-19 symptoms AND [2] triager concerned about severity of symptoms or other causes   Negative: COVID-19 and breastfeeding, questions about   Negative: SEVERE or constant chest pain or pressure (Exception: mild central chest pain, present only when coughing)   Negative: MODERATE difficulty breathing (e.g., speaks in phrases, SOB even at rest, pulse 100-120)   Negative: Headache and stiff neck (can't touch chin to chest)   Negative: MILD difficulty breathing (e.g., minimal/no SOB at rest, SOB with walking, pulse <100)   Negative: Chest pain or pressure   Negative: Patient sounds very sick or weak to the triager   Negative: Fever > 103 F (39.4 C)    Negative: [1] Fever > 101 F (38.3 C) AND [2] over 60 years of age   Negative: [1] Fever > 100.0 F (37.8 C) AND [2] bedridden (e.g., nursing home patient, CVA, chronic illness, recovering from surgery)   Negative: HIGH RISK for severe COVID complications (e.g., age > 64 years, obesity with BMI > 25, pregnant, chronic lung disease or other chronic medical condition) (Exception: Already seen by PCP and no new or worsening symptoms.)   Negative: [1] HIGH RISK patient AND [2] influenza is widespread in the community AND [3] ONE OR MORE respiratory symptoms: cough, sore throat, runny or stuffy nose   Negative: [1] HIGH RISK patient AND [2] influenza exposure within the last 7 days AND [3] ONE OR MORE respiratory symptoms: cough, sore throat, runny or stuffy nose   Negative: [1] COVID-19 infection suspected by caller or triager AND [2] mild symptoms (cough, fever, or others) AND [3] negative COVID-19 rapid test   Negative: Fever present > 3 days (72 hours)   Negative: [1] Fever returns after gone for over 24 hours AND [2] symptoms worse or not improved   Negative: [1] Continuous (nonstop) coughing interferes with work or school AND [2] no improvement using cough treatment per protocol   Negative: Cough present > 3 weeks   Negative: [1] COVID-19 diagnosed by positive lab test AND [2] NO symptoms (e.g., cough, fever, others)    Protocols used: CORONAVIRUS (COVID-19) DIAGNOSED OR [id number]

## 2022-01-19 DIAGNOSIS — E78.5 DYSLIPIDEMIA: ICD-10-CM

## 2022-01-19 NOTE — TELEPHONE ENCOUNTER
No new care gaps identified.  Powered by [employer]. Reference number: [id number].   1/19/2022 2:20:01 AM CST

## 2022-01-23 DIAGNOSIS — I10 ESSENTIAL HYPERTENSION: ICD-10-CM

## 2022-01-23 RX ORDER — LISINOPRIL AND HYDROCHLOROTHIAZIDE 10; 12.5 MG/1; MG/1
TABLET ORAL
Qty: 90 TABLET | Refills: 0 | Status: SHIPPED | OUTPATIENT
Start: 2022-01-23 | End: 2022-04-07

## 2022-01-23 NOTE — TELEPHONE ENCOUNTER
No new care gaps identified.  Powered by [employer]. Reference number: [id number].   1/23/2022 2:16:22 AM CST

## 2022-02-03 RX ORDER — ROSUVASTATIN CALCIUM 20 MG/1
TABLET, COATED ORAL
Qty: 90 TABLET | Refills: 0 | OUTPATIENT
Start: 2022-02-03

## 2022-02-04 NOTE — TELEPHONE ENCOUNTER
Dev DC. Request already responded to by other means (e.g. phone or fax)   Refill Authorization Note   Kelly Queen  is requesting a refill authorization.  Brief Assessment and Rationale for Refill:  Quick Discontinue  Medication Therapy Plan:       Medication Reconciliation Completed:  No      Comments:   Pended Medication(s)       Requested Prescriptions     Refused Prescriptions Disp Refills    rosuvastatin (CRESTOR) 20 MG tablet [Pharmacy Med Name: ROSUVASTATIN CALCIUM 20 MG Tablet] 90 tablet 0     Sig: TAKE 1 TABLET EVERY EVENING        Duplicate Pended Encounter(s)/ Last Prescribed Details: (includes pharmacy & prescriber details)   Ordering Encounter Report    Associated Reports   View Encounter            Note composed:6:10 PM 02/03/2022

## 2022-04-07 DIAGNOSIS — I10 ESSENTIAL HYPERTENSION: ICD-10-CM

## 2022-04-07 RX ORDER — LISINOPRIL AND HYDROCHLOROTHIAZIDE 10; 12.5 MG/1; MG/1
TABLET ORAL
Qty: 90 TABLET | Refills: 0 | Status: SHIPPED | OUTPATIENT
Start: 2022-04-07 | End: 2022-06-09 | Stop reason: SDUPTHER

## 2022-04-07 NOTE — TELEPHONE ENCOUNTER
Care Due:                  Date            Visit Type   Department     Provider  --------------------------------------------------------------------------------                                SAME DAY -                              ESTABLISHED   [facility]  Last Visit: 12-      PATIENT      MEDICINE       Erich MANCILLA                              ANNUAL                              CHECKUP/PHY  [facility]  Next Visit: 06-      S            GUERLINE Mack                                                            Last  Test          Frequency    Reason                     Performed    Due Date  --------------------------------------------------------------------------------    CMP.........  12 months..  lisinopriL-hydrochlorothi  06- 06-                             azide, rosuvastatin......    Lipid Panel.  12 months..  rosuvastatin.............  06- 06-    Powered by [employer]. Reference number: [id number].   4/07/2022 2:16:58 AM CDT

## 2022-04-08 NOTE — TELEPHONE ENCOUNTER
This Rx Request does not qualify for assessment with the [facility]   Please review protocol details and the Care Due Message for extra clinical information    Reasons Rx Request may be deferred:  Labs/Vitals abnormal    Note composed:7:58 PM 04/07/2022

## 2022-04-13 ENCOUNTER — TELEPHONE (OUTPATIENT)
Dept: FAMILY MEDICINE | Facility: CLINIC | Age: 77
End: 2022-04-13
Payer: MEDICARE

## 2022-04-13 NOTE — TELEPHONE ENCOUNTER
Called and spoke to pt about setting up AWV  Declines for now but will call back if changes her mind

## 2022-06-09 ENCOUNTER — OFFICE VISIT (OUTPATIENT)
Dept: FAMILY MEDICINE | Facility: CLINIC | Age: 77
End: 2022-06-09
Attending: FAMILY MEDICINE
Payer: MEDICARE

## 2022-06-09 VITALS
TEMPERATURE: 98 F | BODY MASS INDEX: 29.21 KG/M2 | RESPIRATION RATE: 17 BRPM | HEIGHT: 62 IN | HEART RATE: 82 BPM | DIASTOLIC BLOOD PRESSURE: 68 MMHG | SYSTOLIC BLOOD PRESSURE: 133 MMHG | OXYGEN SATURATION: 97 % | WEIGHT: 158.75 LBS

## 2022-06-09 DIAGNOSIS — N18.31 STAGE 3A CHRONIC KIDNEY DISEASE: ICD-10-CM

## 2022-06-09 DIAGNOSIS — Z78.0 MENOPAUSE: ICD-10-CM

## 2022-06-09 DIAGNOSIS — I10 PRIMARY HYPERTENSION: ICD-10-CM

## 2022-06-09 DIAGNOSIS — Z00.00 ENCOUNTER FOR PREVENTIVE HEALTH EXAMINATION: Primary | ICD-10-CM

## 2022-06-09 DIAGNOSIS — R73.03 PREDIABETES: ICD-10-CM

## 2022-06-09 DIAGNOSIS — M17.0 PRIMARY OSTEOARTHRITIS OF BOTH KNEES: ICD-10-CM

## 2022-06-09 DIAGNOSIS — E78.5 DYSLIPIDEMIA: ICD-10-CM

## 2022-06-09 PROCEDURE — 1126F PR PAIN SEVERITY QUANTIFIED, NO PAIN PRESENT: ICD-10-PCS | Mod: CPTII,S$GLB,, | Performed by: FAMILY MEDICINE

## 2022-06-09 PROCEDURE — 3075F SYST BP GE 130 - 139MM HG: CPT | Mod: CPTII,S$GLB,, | Performed by: FAMILY MEDICINE

## 2022-06-09 PROCEDURE — 99397 PER PM REEVAL EST PAT 65+ YR: CPT | Mod: S$GLB,,, | Performed by: FAMILY MEDICINE

## 2022-06-09 PROCEDURE — 1101F PR PT FALLS ASSESS DOC 0-1 FALLS W/OUT INJ PAST YR: ICD-10-PCS | Mod: CPTII,S$GLB,, | Performed by: FAMILY MEDICINE

## 2022-06-09 PROCEDURE — 1126F AMNT PAIN NOTED NONE PRSNT: CPT | Mod: CPTII,S$GLB,, | Performed by: FAMILY MEDICINE

## 2022-06-09 PROCEDURE — 3288F PR FALLS RISK ASSESSMENT DOCUMENTED: ICD-10-PCS | Mod: CPTII,S$GLB,, | Performed by: FAMILY MEDICINE

## 2022-06-09 PROCEDURE — 99499 UNLISTED E&M SERVICE: CPT | Mod: S$GLB,,, | Performed by: FAMILY MEDICINE

## 2022-06-09 PROCEDURE — 1159F MED LIST DOCD IN RCRD: CPT | Mod: CPTII,S$GLB,, | Performed by: FAMILY MEDICINE

## 2022-06-09 PROCEDURE — 3288F FALL RISK ASSESSMENT DOCD: CPT | Mod: CPTII,S$GLB,, | Performed by: FAMILY MEDICINE

## 2022-06-09 PROCEDURE — 1159F PR MEDICATION LIST DOCUMENTED IN MEDICAL RECORD: ICD-10-PCS | Mod: CPTII,S$GLB,, | Performed by: FAMILY MEDICINE

## 2022-06-09 PROCEDURE — 3078F DIAST BP <80 MM HG: CPT | Mod: CPTII,S$GLB,, | Performed by: FAMILY MEDICINE

## 2022-06-09 PROCEDURE — 99397 PR PREVENTIVE VISIT,EST,65 & OVER: ICD-10-PCS | Mod: S$GLB,,, | Performed by: FAMILY MEDICINE

## 2022-06-09 PROCEDURE — 1160F RVW MEDS BY RX/DR IN RCRD: CPT | Mod: CPTII,S$GLB,, | Performed by: FAMILY MEDICINE

## 2022-06-09 PROCEDURE — 99499 RISK ADDL DX/OHS AUDIT: ICD-10-PCS | Mod: S$GLB,,, | Performed by: FAMILY MEDICINE

## 2022-06-09 PROCEDURE — 99999 PR PBB SHADOW E&M-EST. PATIENT-LVL IV: ICD-10-PCS | Mod: PBBFAC,,, | Performed by: FAMILY MEDICINE

## 2022-06-09 PROCEDURE — 1101F PT FALLS ASSESS-DOCD LE1/YR: CPT | Mod: CPTII,S$GLB,, | Performed by: FAMILY MEDICINE

## 2022-06-09 PROCEDURE — 3075F PR MOST RECENT SYSTOLIC BLOOD PRESS GE 130-139MM HG: ICD-10-PCS | Mod: CPTII,S$GLB,, | Performed by: FAMILY MEDICINE

## 2022-06-09 PROCEDURE — 1160F PR REVIEW ALL MEDS BY PRESCRIBER/CLIN PHARMACIST DOCUMENTED: ICD-10-PCS | Mod: CPTII,S$GLB,, | Performed by: FAMILY MEDICINE

## 2022-06-09 PROCEDURE — 99999 PR PBB SHADOW E&M-EST. PATIENT-LVL IV: CPT | Mod: PBBFAC,,, | Performed by: FAMILY MEDICINE

## 2022-06-09 PROCEDURE — 3078F PR MOST RECENT DIASTOLIC BLOOD PRESSURE < 80 MM HG: ICD-10-PCS | Mod: CPTII,S$GLB,, | Performed by: FAMILY MEDICINE

## 2022-06-09 RX ORDER — LISINOPRIL AND HYDROCHLOROTHIAZIDE 10; 12.5 MG/1; MG/1
1 TABLET ORAL DAILY
Qty: 90 TABLET | Refills: 3 | Status: SHIPPED | OUTPATIENT
Start: 2022-06-09 | End: 2023-04-01

## 2022-06-09 NOTE — PROGRESS NOTES
Subjective:       Patient ID: Kelly Queen is a 77 y.o. female.    Chief Complaint: Annual Exam (Pt states that she is here for her annual exam )    77-year-old female comes in for her physical exam and follow-up on multiple medical problems.  She has hearing loss and wears hearing aids bilaterally although she took them out for this visit due to an upcoming ear exam.  She has hypertension on lisinopril HCT 10-12.5 daily, hyperlipidemia on Crestor 20 mg daily, stage IIIA chronic kidney disease with one brief dip into 3A territory but usually she is in stage II.  She has a history of pre diabetes, osteoarthritis of the knees, and carpal tunnel syndrome on the left with previous injections but no surgery.  Her colonoscopy was last done March 30, 2010 with a recheck in 10 years recommended by Dr. Pirnce.  At her age of 77 she is quite since of bleed declining this.  She is due for a DEXA scan and somewhat reluctantly agrees to that and she is due for check on her lipids A1c CBC and CMP.    Past Medical History:  No date: Arthritis  2/25/15: Carpal tunnel syndrome      Comment:  left; last injected 2/25/15  11/14/2014: Chronic kidney disease, stage III (moderate)  No date: Hypertension    Past Surgical History:  No date: APPENDECTOMY  3/30/2010: COLONOSCOPY      Comment:  Dr. Prince, ten year recheck  5/21/15: FEMUR FRACTURE SURGERY; Left      Comment:  DONNIE lund (L) distal femur  No date: FRACTURE SURGERY      Comment:  5- femer   No date: HYSTERECTOMY  6/7/13: JOINT REPLACEMENT; Left      Comment:  (L) TKA  No date: OOPHORECTOMY  No date: TONSILLECTOMY    Review of patient's family history indicates:  Problem: Diabetes      Relation: Sister          Age of Onset: (Not Specified)  Problem: Cancer      Relation: Sister          Age of Onset: (Not Specified)          Comment: colon  Problem: Hypertension      Relation: Sister          Age of Onset: (Not Specified)  Problem: Diabetes      Relation: Brother           Age of Onset: (Not Specified)  Problem: Cancer      Relation: Brother          Age of Onset: (Not Specified)          Comment: lung  Problem: Basal cell carcinoma      Relation: Daughter          Age of Onset: (Not Specified)  Problem: Skin cancer      Relation: Daughter          Age of Onset: (Not Specified)          Comment: squnamus   Problem: Hypertension      Relation: Mother          Age of Onset: (Not Specified)  Problem: No Known Problems      Relation: Son          Age of Onset: (Not Specified)  Problem: Diabetes      Relation: Brother          Age of Onset: (Not Specified)  Problem: Heart disease      Relation: Brother          Age of Onset: (Not Specified)    Social History    Tobacco Use      Smoking status: Never Smoker      Smokeless tobacco: Never Used    Alcohol use: No    Drug use: No    Current Outpatient Medications on File Prior to Visit:  acetaminophen (TYLENOL) 325 MG tablet, Take 650 mg by mouth every 6 (six) hours as needed for Pain., Disp: , Rfl:   aspirin 81 mg Tab, Take 1 tablet by mouth once daily. , Disp: , Rfl:   biotin 1,000 mcg Chew, Take 1 tablet by mouth once daily., Disp: , Rfl:   cyanocobalamin 500 MCG tablet, Take 500 mcg by mouth once daily., Disp: , Rfl:   fexofenadine (ALLEGRA) 180 MG tablet, Take 180 mg by mouth daily as needed. Every day PRN, Disp: , Rfl:   furosemide (LASIX) 40 MG tablet, Take 40 mg by mouth daily as needed., Disp: , Rfl:   multivitamin capsule, Take 1 capsule by mouth once daily., Disp: , Rfl:   rosuvastatin (CRESTOR) 20 MG tablet, Take 1 tablet (20 mg total) by mouth every evening., Disp: 90 tablet, Rfl: 1  TURMERIC ORAL, Take 1 tablet by mouth once daily., Disp: , Rfl:   ZINC ACETATE ORAL, Take 30 mg by mouth., Disp: , Rfl:   (DISCONTINUED) lisinopriL-hydrochlorothiazide (PRINZIDE,ZESTORETIC) 10-12.5 mg per tablet, TAKE 1 TABLET EVERY DAY, Disp: 90 tablet, Rfl: 0  valACYclovir (VALTREX) 1000 MG tablet, Take 1 tablet (1,000 mg total) by mouth 3 (three)  times daily. for 7 days, Disp: 21 tablet, Rfl: 0  (DISCONTINUED) methylPREDNISolone (MEDROL DOSEPACK) 4 mg tablet, use as directed (Patient not taking: Reported on 6/9/2022), Disp: 1 each, Rfl: 0    No current facility-administered medications on file prior to visit.        Review of Systems   Constitutional: Negative for chills, diaphoresis, fatigue, fever and unexpected weight change.   HENT: Positive for hearing loss. Negative for congestion, ear pain, postnasal drip and sinus pressure.    Eyes: Negative for itching and visual disturbance.   Respiratory: Negative for cough, chest tightness, shortness of breath and wheezing.    Cardiovascular: Negative for chest pain, palpitations and leg swelling.   Gastrointestinal: Negative for abdominal pain, blood in stool, constipation, diarrhea, nausea and vomiting.   Genitourinary: Negative for dysuria, frequency and hematuria.   Musculoskeletal: Negative for arthralgias, back pain, joint swelling and myalgias.   Neurological: Negative for dizziness and headaches.   Hematological: Negative for adenopathy.   Psychiatric/Behavioral: Negative for sleep disturbance. The patient is not nervous/anxious.        Objective:      Physical Exam  Vitals and nursing note reviewed.   Constitutional:       General: She is not in acute distress.     Appearance: Normal appearance. She is well-developed. She is not ill-appearing, toxic-appearing or diaphoretic.      Comments: Good blood pressure control  Mildly overweight with a BMI of 29.0 she is up 5.3 lb from her last checkup June 16, 2021   HENT:      Head: Normocephalic and atraumatic.      Right Ear: Tympanic membrane, ear canal and external ear normal. Decreased hearing noted. There is no impacted cerumen.      Left Ear: Tympanic membrane, ear canal and external ear normal. Decreased hearing noted. There is no impacted cerumen.      Ears:      Comments: Wears hearing aids bilaterally     Nose: Nose normal. No congestion or  rhinorrhea.      Mouth/Throat:      Mouth: Mucous membranes are moist.      Pharynx: Oropharynx is clear. No oropharyngeal exudate or posterior oropharyngeal erythema.   Eyes:      General: No scleral icterus.        Right eye: No discharge.         Left eye: No discharge.      Extraocular Movements: Extraocular movements intact.      Conjunctiva/sclera: Conjunctivae normal.      Pupils: Pupils are equal, round, and reactive to light.   Neck:      Thyroid: No thyromegaly.      Vascular: No carotid bruit or JVD.   Cardiovascular:      Rate and Rhythm: Normal rate and regular rhythm.      Pulses: Normal pulses.      Heart sounds: Normal heart sounds. No murmur heard.    No friction rub. No gallop.   Pulmonary:      Effort: Pulmonary effort is normal. No respiratory distress.      Breath sounds: Normal breath sounds. No stridor. No wheezing, rhonchi or rales.   Chest:      Chest wall: No tenderness.   Abdominal:      General: Abdomen is flat. Bowel sounds are normal. There is no distension.      Palpations: Abdomen is soft. There is no mass.      Tenderness: There is no abdominal tenderness. There is no guarding or rebound.      Hernia: No hernia is present.   Musculoskeletal:         General: No swelling, tenderness, deformity or signs of injury. Normal range of motion.      Cervical back: Normal range of motion and neck supple. No rigidity or tenderness.      Right lower leg: No edema.      Left lower leg: No edema.   Lymphadenopathy:      Cervical: No cervical adenopathy.   Skin:     General: Skin is warm and dry.      Coloration: Skin is not jaundiced or pale.      Findings: No bruising, erythema, lesion or rash.   Neurological:      General: No focal deficit present.      Mental Status: She is alert and oriented to person, place, and time. Mental status is at baseline.      Cranial Nerves: No cranial nerve deficit.      Sensory: No sensory deficit.      Motor: No weakness.      Coordination: Coordination normal.       Gait: Gait normal.      Deep Tendon Reflexes: Reflexes are normal and symmetric. Reflexes normal.   Psychiatric:         Mood and Affect: Mood normal.         Behavior: Behavior normal.         Thought Content: Thought content normal.         Judgment: Judgment normal.         Assessment:       1. Encounter for preventive health examination    2. Primary hypertension    3. Dyslipidemia    4. Prediabetes    5. Stage 3a chronic kidney disease    6. Primary osteoarthritis of both knees    7. Menopause    8. BMI 29.0-29.9,adult        Plan:       1. Encounter for preventive health examination  - Comprehensive Metabolic Panel; Future  - Lipid Panel; Future  - CBC Auto Differential; Future    2. Primary hypertension  Good control, no changes needed in medication  - lisinopriL-hydrochlorothiazide (PRINZIDE,ZESTORETIC) 10-12.5 mg per tablet; Take 1 tablet by mouth once daily.  Dispense: 90 tablet; Refill: 3  - Comprehensive Metabolic Panel; Future  - Lipid Panel; Future  - CBC Auto Differential; Future    3. Dyslipidemia  Await lab results for possible dose change on Crestor  - Comprehensive Metabolic Panel; Future  - Lipid Panel; Future    4. Prediabetes  Await A1c and chemistry panel results  - Comprehensive Metabolic Panel; Future  - Hemoglobin A1C; Future    5. Stage 3a chronic kidney disease  Await chemistry panel results  - Comprehensive Metabolic Panel; Future    6. Primary osteoarthritis of both knees  Stable    7. Menopause  DEXA scan ordered  - DXA Bone Density Spine And Hip; Future    8. BMI 29.0-29.9,adult

## 2022-06-10 ENCOUNTER — HOSPITAL ENCOUNTER (OUTPATIENT)
Dept: RADIOLOGY | Facility: CLINIC | Age: 77
Discharge: HOME | End: 2022-06-10
Attending: FAMILY MEDICINE
Payer: MEDICARE

## 2022-06-10 DIAGNOSIS — Z78.0 MENOPAUSE: ICD-10-CM

## 2022-06-10 PROCEDURE — 77080 DEXA BONE DENSITY SPINE HIP: ICD-10-PCS | Mod: 26,,, | Performed by: RADIOLOGY

## 2022-06-10 PROCEDURE — 77080 DXA BONE DENSITY AXIAL: CPT | Mod: TC,PO

## 2022-06-10 PROCEDURE — 77080 DXA BONE DENSITY AXIAL: CPT | Mod: 26,,, | Performed by: RADIOLOGY

## 2022-06-23 DIAGNOSIS — E78.5 DYSLIPIDEMIA: ICD-10-CM

## 2022-06-23 RX ORDER — ROSUVASTATIN CALCIUM 20 MG/1
20 TABLET, COATED ORAL NIGHTLY
Qty: 90 TABLET | Refills: 3 | Status: SHIPPED | OUTPATIENT
Start: 2022-06-23 | End: 2023-06-24

## 2022-06-23 NOTE — TELEPHONE ENCOUNTER
Refill Authorization Note   Kelly Queen  is requesting a refill authorization.  Brief Assessment and Rationale for Refill:  Approve     Medication Therapy Plan:       Medication Reconciliation Completed: No   Comments:     No Care Gaps recommended.     Note composed:11:51 AM 06/23/2022

## 2022-06-23 NOTE — TELEPHONE ENCOUNTER
No new care gaps identified.  [facility] Embedded Care Gaps. Reference number: [id number]. 6/23/2022   2:14:36 AM CDT

## 2022-06-28 ENCOUNTER — OFFICE VISIT (OUTPATIENT)
Dept: FAMILY MEDICINE | Facility: CLINIC | Age: 77
End: 2022-06-28
Payer: MEDICARE

## 2022-06-28 ENCOUNTER — TELEPHONE (OUTPATIENT)
Dept: FAMILY MEDICINE | Facility: CLINIC | Age: 77
End: 2022-06-28

## 2022-06-28 VITALS
TEMPERATURE: 99 F | OXYGEN SATURATION: 98 % | HEART RATE: 86 BPM | BODY MASS INDEX: 29.01 KG/M2 | WEIGHT: 157.63 LBS | SYSTOLIC BLOOD PRESSURE: 130 MMHG | HEIGHT: 62 IN | DIASTOLIC BLOOD PRESSURE: 62 MMHG

## 2022-06-28 DIAGNOSIS — B02.8 HERPES ZOSTER WITH COMPLICATION: ICD-10-CM

## 2022-06-28 DIAGNOSIS — B02.9 HERPES ZOSTER WITHOUT COMPLICATION: Primary | ICD-10-CM

## 2022-06-28 PROCEDURE — 99213 PR OFFICE/OUTPT VISIT, EST, LEVL III, 20-29 MIN: ICD-10-PCS | Mod: S$GLB,,, | Performed by: NURSE PRACTITIONER

## 2022-06-28 PROCEDURE — 3075F SYST BP GE 130 - 139MM HG: CPT | Mod: CPTII,S$GLB,, | Performed by: NURSE PRACTITIONER

## 2022-06-28 PROCEDURE — 1101F PT FALLS ASSESS-DOCD LE1/YR: CPT | Mod: CPTII,S$GLB,, | Performed by: NURSE PRACTITIONER

## 2022-06-28 PROCEDURE — 3075F PR MOST RECENT SYSTOLIC BLOOD PRESS GE 130-139MM HG: ICD-10-PCS | Mod: CPTII,S$GLB,, | Performed by: NURSE PRACTITIONER

## 2022-06-28 PROCEDURE — 3078F DIAST BP <80 MM HG: CPT | Mod: CPTII,S$GLB,, | Performed by: NURSE PRACTITIONER

## 2022-06-28 PROCEDURE — 1159F MED LIST DOCD IN RCRD: CPT | Mod: CPTII,S$GLB,, | Performed by: NURSE PRACTITIONER

## 2022-06-28 PROCEDURE — 1160F RVW MEDS BY RX/DR IN RCRD: CPT | Mod: CPTII,S$GLB,, | Performed by: NURSE PRACTITIONER

## 2022-06-28 PROCEDURE — 99999 PR PBB SHADOW E&M-EST. PATIENT-LVL IV: CPT | Mod: PBBFAC,,, | Performed by: NURSE PRACTITIONER

## 2022-06-28 PROCEDURE — 1160F PR REVIEW ALL MEDS BY PRESCRIBER/CLIN PHARMACIST DOCUMENTED: ICD-10-PCS | Mod: CPTII,S$GLB,, | Performed by: NURSE PRACTITIONER

## 2022-06-28 PROCEDURE — 1101F PR PT FALLS ASSESS DOC 0-1 FALLS W/OUT INJ PAST YR: ICD-10-PCS | Mod: CPTII,S$GLB,, | Performed by: NURSE PRACTITIONER

## 2022-06-28 PROCEDURE — 3078F PR MOST RECENT DIASTOLIC BLOOD PRESSURE < 80 MM HG: ICD-10-PCS | Mod: CPTII,S$GLB,, | Performed by: NURSE PRACTITIONER

## 2022-06-28 PROCEDURE — 99999 PR PBB SHADOW E&M-EST. PATIENT-LVL IV: ICD-10-PCS | Mod: PBBFAC,,, | Performed by: NURSE PRACTITIONER

## 2022-06-28 PROCEDURE — 99213 OFFICE O/P EST LOW 20 MIN: CPT | Mod: S$GLB,,, | Performed by: NURSE PRACTITIONER

## 2022-06-28 PROCEDURE — 1159F PR MEDICATION LIST DOCUMENTED IN MEDICAL RECORD: ICD-10-PCS | Mod: CPTII,S$GLB,, | Performed by: NURSE PRACTITIONER

## 2022-06-28 PROCEDURE — 3288F FALL RISK ASSESSMENT DOCD: CPT | Mod: CPTII,S$GLB,, | Performed by: NURSE PRACTITIONER

## 2022-06-28 PROCEDURE — 3288F PR FALLS RISK ASSESSMENT DOCUMENTED: ICD-10-PCS | Mod: CPTII,S$GLB,, | Performed by: NURSE PRACTITIONER

## 2022-06-28 PROCEDURE — 1126F AMNT PAIN NOTED NONE PRSNT: CPT | Mod: CPTII,S$GLB,, | Performed by: NURSE PRACTITIONER

## 2022-06-28 PROCEDURE — 1126F PR PAIN SEVERITY QUANTIFIED, NO PAIN PRESENT: ICD-10-PCS | Mod: CPTII,S$GLB,, | Performed by: NURSE PRACTITIONER

## 2022-06-28 RX ORDER — VALACYCLOVIR HYDROCHLORIDE 1 G/1
1000 TABLET, FILM COATED ORAL 2 TIMES DAILY
Qty: 60 TABLET | Refills: 11 | Status: CANCELLED | OUTPATIENT
Start: 2022-06-28 | End: 2023-06-28

## 2022-06-28 RX ORDER — VALACYCLOVIR HYDROCHLORIDE 1 G/1
1000 TABLET, FILM COATED ORAL 3 TIMES DAILY
Qty: 21 TABLET | Refills: 0 | Status: SHIPPED | OUTPATIENT
Start: 2022-06-28 | End: 2022-11-02

## 2022-06-28 NOTE — PROGRESS NOTES
Subjective:       Patient ID: Kelly Queen is a 77 y.o. female.    Chief Complaint: Herpes Zoster    HPI   78 y/o female patient with medical problems listed below presents for rash around right side of the nose. Associated with headache. States first noticed rash last Friday 4 days ago, and then noticed another rash since this Sunday 2 days ago and states feels itchy around the eyebrow today. Denies eye pain, photophobia.     Patient Active Problem List   Diagnosis    Hypertension    Arthritis    Stage 3a chronic kidney disease    Prediabetes    Dyslipidemia    Primary osteoarthritis of both knees    BMI 30.0-30.9,adult      Review of patient's allergies indicates:   Allergen Reactions    Codeine      Other reaction(s): pt does not want to take     Past Surgical History:   Procedure Laterality Date    APPENDECTOMY      COLONOSCOPY  3/30/2010    Dr. Prince, ten year recheck    FEMUR FRACTURE SURGERY Left 5/21/15    IM rodding (L) distal femur    FRACTURE SURGERY      5- femer     HYSTERECTOMY      JOINT REPLACEMENT Left 6/7/13    (L) TKA    OOPHORECTOMY      TONSILLECTOMY          Current Outpatient Medications:     acetaminophen (TYLENOL) 325 MG tablet, Take 650 mg by mouth every 6 (six) hours as needed for Pain., Disp: , Rfl:     aspirin 81 mg Tab, Take 1 tablet by mouth once daily. , Disp: , Rfl:     biotin 1,000 mcg Chew, Take 1 tablet by mouth once daily., Disp: , Rfl:     cyanocobalamin 500 MCG tablet, Take 500 mcg by mouth once daily., Disp: , Rfl:     fexofenadine (ALLEGRA) 180 MG tablet, Take 180 mg by mouth daily as needed. Every day PRN, Disp: , Rfl:     furosemide (LASIX) 40 MG tablet, Take 40 mg by mouth daily as needed., Disp: , Rfl:     lisinopriL-hydrochlorothiazide (PRINZIDE,ZESTORETIC) 10-12.5 mg per tablet, Take 1 tablet by mouth once daily., Disp: 90 tablet, Rfl: 3    multivitamin capsule, Take 1 capsule by mouth once daily., Disp: , Rfl:     rosuvastatin  "(CRESTOR) 20 MG tablet, TAKE 1 TABLET (20 MG TOTAL) BY MOUTH EVERY EVENING., Disp: 90 tablet, Rfl: 3    TURMERIC ORAL, Take 1 tablet by mouth once daily., Disp: , Rfl:     ZINC ACETATE ORAL, Take 30 mg by mouth., Disp: , Rfl:     valACYclovir (VALTREX) 1000 MG tablet, Take 1 tablet (1,000 mg total) by mouth 3 (three) times daily. for 7 days, Disp: 21 tablet, Rfl: 0    Lab Results   Component Value Date    WBC 8.76 06/10/2022    HGB 13.1 06/10/2022    HCT 40.1 06/10/2022     06/10/2022    CHOL 140 06/10/2022    TRIG 98 06/10/2022    HDL 55 06/10/2022    ALT 16 06/10/2022    AST 18 06/10/2022     06/10/2022    K 4.7 06/10/2022     06/10/2022    CREATININE 0.8 06/10/2022    BUN 25 (H) 06/10/2022    CO2 23 06/10/2022    TSH 0.655 04/25/2019    GLUF 89 11/12/2018    HGBA1C 5.9 (H) 06/10/2022     Review of Systems   Constitutional: Negative for chills and fever.   HENT: Negative for ear discharge and ear pain.    Respiratory: Negative for chest tightness and shortness of breath.    Cardiovascular: Negative for chest pain and palpitations.   Gastrointestinal: Negative for abdominal pain.   Neurological: Negative for dizziness and headaches.       Objective:   /62 (BP Location: Right arm, Patient Position: Sitting, BP Method: Small (Manual))   Pulse 86   Temp 99.1 °F (37.3 °C) (Oral)   Ht 5' 2" (1.575 m)   Wt 71.5 kg (157 lb 10.1 oz)   SpO2 98%   BMI 28.83 kg/m²       Physical Exam  Constitutional:       General: She is not in acute distress.     Appearance: Normal appearance.   HENT:      Head: Atraumatic.        Mouth/Throat:      Mouth: Mucous membranes are moist.   Eyes:      General:         Right eye: No discharge.         Left eye: No discharge.      Extraocular Movements: Extraocular movements intact.      Pupils: Pupils are equal, round, and reactive to light.   Cardiovascular:      Rate and Rhythm: Normal rate and regular rhythm.   Pulmonary:      Effort: Pulmonary effort is " normal.      Breath sounds: Normal breath sounds.   Abdominal:      General: Abdomen is flat. Bowel sounds are normal.      Palpations: Abdomen is soft.   Skin:     General: Skin is warm and dry.   Neurological:      General: No focal deficit present.      Mental Status: She is alert and oriented to person, place, and time.   Psychiatric:         Mood and Affect: Mood normal.         Assessment:       1. Herpes zoster without complication  Plan:     1. Herpes zoster without complication    - valACYclovir (VALTREX) 1000 MG tablet; Take 1 tablet (1,000 mg total) by mouth 3 (three) times daily. for 7 days  Dispense: 21 tablet; Refill: 0  - Ambulatory referral/consult to Ophthalmology; Future  - s&s discussed with patient when to seek emergent care    Rosa MAGALLON

## 2022-06-28 NOTE — TELEPHONE ENCOUNTER
----- Message from Cora Fabián sent at 6/28/2022 12:34 PM CDT -----  Contact: pt  Type: Needs chart notes faxed over     Who Called: Dr Nunez's office  Best Call Back Number: [phone] fax is [phone]  Inquiry/Question: pt was seen by Dr Gaines this morning and he referred her to Dr Nunez, they would like the chart notes faxed over. She is being seen there in the morning.       Thank you~

## 2022-11-02 ENCOUNTER — OFFICE VISIT (OUTPATIENT)
Dept: FAMILY MEDICINE | Facility: CLINIC | Age: 77
End: 2022-11-02
Payer: MEDICARE

## 2022-11-02 VITALS
RESPIRATION RATE: 14 BRPM | HEIGHT: 62 IN | DIASTOLIC BLOOD PRESSURE: 60 MMHG | OXYGEN SATURATION: 95 % | SYSTOLIC BLOOD PRESSURE: 124 MMHG | HEART RATE: 73 BPM | TEMPERATURE: 98 F | WEIGHT: 159.81 LBS | BODY MASS INDEX: 29.41 KG/M2

## 2022-11-02 DIAGNOSIS — J01.00 ACUTE MAXILLARY SINUSITIS, RECURRENCE NOT SPECIFIED: Primary | ICD-10-CM

## 2022-11-02 DIAGNOSIS — J06.9 ACUTE URI: ICD-10-CM

## 2022-11-02 DIAGNOSIS — I10 ESSENTIAL HYPERTENSION: ICD-10-CM

## 2022-11-02 LAB
CTP QC/QA: YES
SARS-COV-2 RDRP RESP QL NAA+PROBE: NEGATIVE

## 2022-11-02 PROCEDURE — 3288F FALL RISK ASSESSMENT DOCD: CPT | Mod: CPTII,S$GLB,, | Performed by: FAMILY MEDICINE

## 2022-11-02 PROCEDURE — 3074F SYST BP LT 130 MM HG: CPT | Mod: CPTII,S$GLB,, | Performed by: FAMILY MEDICINE

## 2022-11-02 PROCEDURE — 1101F PR PT FALLS ASSESS DOC 0-1 FALLS W/OUT INJ PAST YR: ICD-10-PCS | Mod: CPTII,S$GLB,, | Performed by: FAMILY MEDICINE

## 2022-11-02 PROCEDURE — 99999 PR PBB SHADOW E&M-EST. PATIENT-LVL IV: ICD-10-PCS | Mod: PBBFAC,,, | Performed by: FAMILY MEDICINE

## 2022-11-02 PROCEDURE — 3078F PR MOST RECENT DIASTOLIC BLOOD PRESSURE < 80 MM HG: ICD-10-PCS | Mod: CPTII,S$GLB,, | Performed by: FAMILY MEDICINE

## 2022-11-02 PROCEDURE — 1101F PT FALLS ASSESS-DOCD LE1/YR: CPT | Mod: CPTII,S$GLB,, | Performed by: FAMILY MEDICINE

## 2022-11-02 PROCEDURE — 1126F PR PAIN SEVERITY QUANTIFIED, NO PAIN PRESENT: ICD-10-PCS | Mod: CPTII,S$GLB,, | Performed by: FAMILY MEDICINE

## 2022-11-02 PROCEDURE — 1159F MED LIST DOCD IN RCRD: CPT | Mod: CPTII,S$GLB,, | Performed by: FAMILY MEDICINE

## 2022-11-02 PROCEDURE — 3074F PR MOST RECENT SYSTOLIC BLOOD PRESSURE < 130 MM HG: ICD-10-PCS | Mod: CPTII,S$GLB,, | Performed by: FAMILY MEDICINE

## 2022-11-02 PROCEDURE — 1160F RVW MEDS BY RX/DR IN RCRD: CPT | Mod: CPTII,S$GLB,, | Performed by: FAMILY MEDICINE

## 2022-11-02 PROCEDURE — 99214 OFFICE O/P EST MOD 30 MIN: CPT | Mod: S$GLB,,, | Performed by: FAMILY MEDICINE

## 2022-11-02 PROCEDURE — 1160F PR REVIEW ALL MEDS BY PRESCRIBER/CLIN PHARMACIST DOCUMENTED: ICD-10-PCS | Mod: CPTII,S$GLB,, | Performed by: FAMILY MEDICINE

## 2022-11-02 PROCEDURE — 99214 PR OFFICE/OUTPT VISIT, EST, LEVL IV, 30-39 MIN: ICD-10-PCS | Mod: S$GLB,,, | Performed by: FAMILY MEDICINE

## 2022-11-02 PROCEDURE — 87635: ICD-10-PCS | Mod: QW,S$GLB,, | Performed by: FAMILY MEDICINE

## 2022-11-02 PROCEDURE — 87635 SARS-COV-2 COVID-19 AMP PRB: CPT | Mod: QW,S$GLB,, | Performed by: FAMILY MEDICINE

## 2022-11-02 PROCEDURE — 1126F AMNT PAIN NOTED NONE PRSNT: CPT | Mod: CPTII,S$GLB,, | Performed by: FAMILY MEDICINE

## 2022-11-02 PROCEDURE — 3288F PR FALLS RISK ASSESSMENT DOCUMENTED: ICD-10-PCS | Mod: CPTII,S$GLB,, | Performed by: FAMILY MEDICINE

## 2022-11-02 PROCEDURE — 1159F PR MEDICATION LIST DOCUMENTED IN MEDICAL RECORD: ICD-10-PCS | Mod: CPTII,S$GLB,, | Performed by: FAMILY MEDICINE

## 2022-11-02 PROCEDURE — 3078F DIAST BP <80 MM HG: CPT | Mod: CPTII,S$GLB,, | Performed by: FAMILY MEDICINE

## 2022-11-02 PROCEDURE — 99999 PR PBB SHADOW E&M-EST. PATIENT-LVL IV: CPT | Mod: PBBFAC,,, | Performed by: FAMILY MEDICINE

## 2022-11-02 RX ORDER — PREDNISONE 20 MG/1
TABLET ORAL
Qty: 10 TABLET | Refills: 0 | Status: SHIPPED | OUTPATIENT
Start: 2022-11-02 | End: 2023-06-12

## 2022-11-02 RX ORDER — AMOXICILLIN AND CLAVULANATE POTASSIUM 875; 125 MG/1; MG/1
1 TABLET, FILM COATED ORAL 2 TIMES DAILY WITH MEALS
Qty: 14 TABLET | Refills: 0 | Status: SHIPPED | OUTPATIENT
Start: 2022-11-02 | End: 2023-03-23

## 2022-11-02 NOTE — PATIENT INSTRUCTIONS
Push fluids intake.  Drink plenty of water.     Contact your PCP if any worsening or for any new concerns as we discussed.

## 2022-11-02 NOTE — PROGRESS NOTES
Subjective:       Patient ID: Kelly Queen is a 77 y.o. female.    Chief Complaint: Sinusitis (Cough, sinus congestion x 2 weeks - robitussin )    Patient here for UC visit.  2 weeks of cough and congestion.  Now has facial pain/pressure and thick yellow PND.  No fever, SOB or pl pain.  Covid negative here today.    Sinusitis  Associated symptoms include congestion, coughing and sinus pressure. Pertinent negatives include no shortness of breath.   Review of Systems   Constitutional:  Negative for fever.   HENT:  Positive for congestion, sinus pressure and sinus pain.    Respiratory:  Positive for cough. Negative for shortness of breath.    Cardiovascular:  Negative for chest pain.   Gastrointestinal:  Negative for abdominal pain and nausea.   Skin:  Negative for rash.   All other systems reviewed and are negative.    Objective:      Physical Exam  Constitutional:       General: She is not in acute distress.     Appearance: She is well-developed.   HENT:      Right Ear: Tympanic membrane normal. Tympanic membrane is not erythematous.      Left Ear: Tympanic membrane normal. Tympanic membrane is not erythematous.      Nose: Mucosal edema present.      Right Sinus: Maxillary sinus tenderness and frontal sinus tenderness present.      Left Sinus: Maxillary sinus tenderness and frontal sinus tenderness present.      Mouth/Throat:      Pharynx: Posterior oropharyngeal erythema present.   Cardiovascular:      Rate and Rhythm: Normal rate and regular rhythm.      Heart sounds: No murmur heard.  Pulmonary:      Effort: Pulmonary effort is normal.      Breath sounds: Normal breath sounds.   Musculoskeletal:      Cervical back: Neck supple.   Lymphadenopathy:      Cervical: No cervical adenopathy.   Skin:     General: Skin is warm and dry.       Assessment:       1. Acute maxillary sinusitis, recurrence not specified    2. Acute URI    3. Essential hypertension          Plan:       Acute maxillary sinusitis, recurrence  not specified  -     predniSONE (DELTASONE) 20 MG tablet; One BID for 3 days then once a day orally  Dispense: 10 tablet; Refill: 0  -     amoxicillin-clavulanate 875-125mg (AUGMENTIN) 875-125 mg per tablet; Take 1 tablet by mouth 2 (two) times daily with meals.  Dispense: 14 tablet; Refill: 0    Acute URI  -     POCT COVID-19 Rapid Screening    Essential hypertension    Patient Instructions   Push fluids intake.  Drink plenty of water.     Contact your PCP if any worsening or for any new concerns as we discussed.

## 2023-02-07 DIAGNOSIS — Z00.00 ENCOUNTER FOR MEDICARE ANNUAL WELLNESS EXAM: ICD-10-CM

## 2023-02-09 DIAGNOSIS — Z00.00 ENCOUNTER FOR MEDICARE ANNUAL WELLNESS EXAM: ICD-10-CM

## 2023-03-15 ENCOUNTER — PES CALL (OUTPATIENT)
Dept: ADMINISTRATIVE | Facility: CLINIC | Age: 78
End: 2023-03-15
Payer: MEDICARE

## 2023-03-23 ENCOUNTER — OFFICE VISIT (OUTPATIENT)
Dept: FAMILY MEDICINE | Facility: CLINIC | Age: 78
End: 2023-03-23
Payer: MEDICARE

## 2023-03-23 VITALS
HEIGHT: 62 IN | RESPIRATION RATE: 16 BRPM | TEMPERATURE: 98 F | WEIGHT: 163.56 LBS | DIASTOLIC BLOOD PRESSURE: 50 MMHG | BODY MASS INDEX: 30.1 KG/M2 | SYSTOLIC BLOOD PRESSURE: 128 MMHG | HEART RATE: 80 BPM | OXYGEN SATURATION: 98 %

## 2023-03-23 DIAGNOSIS — R30.0 DYSURIA: Primary | ICD-10-CM

## 2023-03-23 DIAGNOSIS — R31.9 URINARY TRACT INFECTION WITH HEMATURIA, SITE UNSPECIFIED: ICD-10-CM

## 2023-03-23 DIAGNOSIS — N39.0 URINARY TRACT INFECTION WITH HEMATURIA, SITE UNSPECIFIED: ICD-10-CM

## 2023-03-23 LAB
BILIRUBIN, UA POC OHS: NEGATIVE
BLOOD, UA POC OHS: ABNORMAL
CLARITY, UA POC OHS: CLEAR
COLOR, UA POC OHS: YELLOW
GLUCOSE, UA POC OHS: NEGATIVE
KETONES, UA POC OHS: NEGATIVE
LEUKOCYTES, UA POC OHS: ABNORMAL
NITRITE, UA POC OHS: NEGATIVE
PH, UA POC OHS: 5.5
PROTEIN, UA POC OHS: NEGATIVE
SPECIFIC GRAVITY, UA POC OHS: <=1.005
UROBILINOGEN, UA POC OHS: 0.2

## 2023-03-23 PROCEDURE — 3078F DIAST BP <80 MM HG: CPT | Mod: HCNC,CPTII,S$GLB, | Performed by: FAMILY MEDICINE

## 2023-03-23 PROCEDURE — 87077 CULTURE AEROBIC IDENTIFY: CPT | Mod: HCNC | Performed by: FAMILY MEDICINE

## 2023-03-23 PROCEDURE — 3288F PR FALLS RISK ASSESSMENT DOCUMENTED: ICD-10-PCS | Mod: HCNC,CPTII,S$GLB, | Performed by: FAMILY MEDICINE

## 2023-03-23 PROCEDURE — 1101F PR PT FALLS ASSESS DOC 0-1 FALLS W/OUT INJ PAST YR: ICD-10-PCS | Mod: HCNC,CPTII,S$GLB, | Performed by: FAMILY MEDICINE

## 2023-03-23 PROCEDURE — 99213 PR OFFICE/OUTPT VISIT, EST, LEVL III, 20-29 MIN: ICD-10-PCS | Mod: HCNC,S$GLB,, | Performed by: FAMILY MEDICINE

## 2023-03-23 PROCEDURE — 87186 SC STD MICRODIL/AGAR DIL: CPT | Mod: HCNC | Performed by: FAMILY MEDICINE

## 2023-03-23 PROCEDURE — 3074F SYST BP LT 130 MM HG: CPT | Mod: HCNC,CPTII,S$GLB, | Performed by: FAMILY MEDICINE

## 2023-03-23 PROCEDURE — 87086 URINE CULTURE/COLONY COUNT: CPT | Mod: HCNC | Performed by: FAMILY MEDICINE

## 2023-03-23 PROCEDURE — 99999 PR PBB SHADOW E&M-EST. PATIENT-LVL III: ICD-10-PCS | Mod: PBBFAC,HCNC,, | Performed by: FAMILY MEDICINE

## 2023-03-23 PROCEDURE — 99213 OFFICE O/P EST LOW 20 MIN: CPT | Mod: HCNC,S$GLB,, | Performed by: FAMILY MEDICINE

## 2023-03-23 PROCEDURE — 99999 PR PBB SHADOW E&M-EST. PATIENT-LVL III: CPT | Mod: PBBFAC,HCNC,, | Performed by: FAMILY MEDICINE

## 2023-03-23 PROCEDURE — 1160F PR REVIEW ALL MEDS BY PRESCRIBER/CLIN PHARMACIST DOCUMENTED: ICD-10-PCS | Mod: HCNC,CPTII,S$GLB, | Performed by: FAMILY MEDICINE

## 2023-03-23 PROCEDURE — 3074F PR MOST RECENT SYSTOLIC BLOOD PRESSURE < 130 MM HG: ICD-10-PCS | Mod: HCNC,CPTII,S$GLB, | Performed by: FAMILY MEDICINE

## 2023-03-23 PROCEDURE — 3078F PR MOST RECENT DIASTOLIC BLOOD PRESSURE < 80 MM HG: ICD-10-PCS | Mod: HCNC,CPTII,S$GLB, | Performed by: FAMILY MEDICINE

## 2023-03-23 PROCEDURE — 1126F PR PAIN SEVERITY QUANTIFIED, NO PAIN PRESENT: ICD-10-PCS | Mod: HCNC,CPTII,S$GLB, | Performed by: FAMILY MEDICINE

## 2023-03-23 PROCEDURE — 1159F MED LIST DOCD IN RCRD: CPT | Mod: HCNC,CPTII,S$GLB, | Performed by: FAMILY MEDICINE

## 2023-03-23 PROCEDURE — 87088 URINE BACTERIA CULTURE: CPT | Mod: HCNC | Performed by: FAMILY MEDICINE

## 2023-03-23 PROCEDURE — 81003 URINALYSIS AUTO W/O SCOPE: CPT | Mod: QW,HCNC,S$GLB, | Performed by: FAMILY MEDICINE

## 2023-03-23 PROCEDURE — 3288F FALL RISK ASSESSMENT DOCD: CPT | Mod: HCNC,CPTII,S$GLB, | Performed by: FAMILY MEDICINE

## 2023-03-23 PROCEDURE — 81003 POCT URINALYSIS(INSTRUMENT): ICD-10-PCS | Mod: QW,HCNC,S$GLB, | Performed by: FAMILY MEDICINE

## 2023-03-23 PROCEDURE — 1126F AMNT PAIN NOTED NONE PRSNT: CPT | Mod: HCNC,CPTII,S$GLB, | Performed by: FAMILY MEDICINE

## 2023-03-23 PROCEDURE — 1159F PR MEDICATION LIST DOCUMENTED IN MEDICAL RECORD: ICD-10-PCS | Mod: HCNC,CPTII,S$GLB, | Performed by: FAMILY MEDICINE

## 2023-03-23 PROCEDURE — 1101F PT FALLS ASSESS-DOCD LE1/YR: CPT | Mod: HCNC,CPTII,S$GLB, | Performed by: FAMILY MEDICINE

## 2023-03-23 PROCEDURE — 1160F RVW MEDS BY RX/DR IN RCRD: CPT | Mod: HCNC,CPTII,S$GLB, | Performed by: FAMILY MEDICINE

## 2023-03-23 RX ORDER — CLOTRIMAZOLE AND BETAMETHASONE DIPROPIONATE 10; .64 MG/G; MG/G
CREAM TOPICAL 2 TIMES DAILY
Qty: 45 G | Refills: 1 | Status: ON HOLD | OUTPATIENT
Start: 2023-03-23

## 2023-03-23 RX ORDER — SULFAMETHOXAZOLE AND TRIMETHOPRIM 800; 160 MG/1; MG/1
1 TABLET ORAL 2 TIMES DAILY
Qty: 14 TABLET | Refills: 0 | Status: SHIPPED | OUTPATIENT
Start: 2023-03-23 | End: 2023-06-12

## 2023-03-23 NOTE — PROGRESS NOTES
Subjective:       Patient ID: Kelly Queen is a 77 y.o. female.    Chief Complaint: No chief complaint on file.    Patient here for UC visit.  Few days of very frequent urination.  No blood or pain.  Wears pads and has some external itch/irritation as well.    Review of Systems   Constitutional:  Negative for fever.   Respiratory:  Negative for shortness of breath.    Cardiovascular:  Negative for chest pain.   Gastrointestinal:  Negative for abdominal pain and nausea.   Skin:  Negative for rash.   All other systems reviewed and are negative.    Objective:      Physical Exam  Constitutional:       General: She is not in acute distress.     Appearance: She is well-developed.   Cardiovascular:      Rate and Rhythm: Normal rate and regular rhythm.      Heart sounds: No murmur heard.  Pulmonary:      Effort: Pulmonary effort is normal.      Breath sounds: Normal breath sounds.   Abdominal:      Palpations: Abdomen is soft.      Tenderness: There is abdominal tenderness in the suprapubic area. There is no right CVA tenderness or left CVA tenderness.       Assessment:       1. Dysuria    2. Urinary tract infection with hematuria, site unspecified        Plan:       Dysuria  -     POCT Urinalysis(Instrument)    Urinary tract infection with hematuria, site unspecified  -     sulfamethoxazole-trimethoprim 800-160mg (BACTRIM DS) 800-160 mg Tab; Take 1 tablet by mouth 2 (two) times daily.  Dispense: 14 tablet; Refill: 0  -     Urine culture    Other orders  -     clotrimazole-betamethasone 1-0.05% (LOTRISONE) cream; Apply topically 2 (two) times daily.  Dispense: 45 g; Refill: 1      Patient Instructions   Push fluids intake.  Drink plenty of water.     Contact your PCP if any worsening or for any new concerns as we discussed.

## 2023-03-26 LAB — BACTERIA UR CULT: ABNORMAL

## 2023-06-12 ENCOUNTER — OFFICE VISIT (OUTPATIENT)
Dept: FAMILY MEDICINE | Facility: CLINIC | Age: 78
End: 2023-06-12
Attending: FAMILY MEDICINE
Payer: MEDICARE

## 2023-06-12 VITALS
WEIGHT: 163.56 LBS | HEART RATE: 72 BPM | SYSTOLIC BLOOD PRESSURE: 121 MMHG | BODY MASS INDEX: 30.1 KG/M2 | HEIGHT: 62 IN | RESPIRATION RATE: 18 BRPM | DIASTOLIC BLOOD PRESSURE: 62 MMHG | TEMPERATURE: 99 F | OXYGEN SATURATION: 97 %

## 2023-06-12 DIAGNOSIS — E78.5 DYSLIPIDEMIA: ICD-10-CM

## 2023-06-12 DIAGNOSIS — R73.03 PREDIABETES: ICD-10-CM

## 2023-06-12 DIAGNOSIS — Z00.00 ENCOUNTER FOR PREVENTIVE HEALTH EXAMINATION: Primary | ICD-10-CM

## 2023-06-12 DIAGNOSIS — M17.0 PRIMARY OSTEOARTHRITIS OF BOTH KNEES: ICD-10-CM

## 2023-06-12 DIAGNOSIS — I10 PRIMARY HYPERTENSION: ICD-10-CM

## 2023-06-12 DIAGNOSIS — N18.31 STAGE 3A CHRONIC KIDNEY DISEASE: ICD-10-CM

## 2023-06-12 PROCEDURE — 99397 PER PM REEVAL EST PAT 65+ YR: CPT | Mod: S$GLB,,, | Performed by: FAMILY MEDICINE

## 2023-06-12 PROCEDURE — 1101F PR PT FALLS ASSESS DOC 0-1 FALLS W/OUT INJ PAST YR: ICD-10-PCS | Mod: CPTII,S$GLB,, | Performed by: FAMILY MEDICINE

## 2023-06-12 PROCEDURE — 99397 PR PREVENTIVE VISIT,EST,65 & OVER: ICD-10-PCS | Mod: S$GLB,,, | Performed by: FAMILY MEDICINE

## 2023-06-12 PROCEDURE — 3074F SYST BP LT 130 MM HG: CPT | Mod: CPTII,S$GLB,, | Performed by: FAMILY MEDICINE

## 2023-06-12 PROCEDURE — 99499 RISK ADDL DX/OHS AUDIT: ICD-10-PCS | Mod: HCNC,S$GLB,, | Performed by: FAMILY MEDICINE

## 2023-06-12 PROCEDURE — 1101F PT FALLS ASSESS-DOCD LE1/YR: CPT | Mod: CPTII,S$GLB,, | Performed by: FAMILY MEDICINE

## 2023-06-12 PROCEDURE — 3078F PR MOST RECENT DIASTOLIC BLOOD PRESSURE < 80 MM HG: ICD-10-PCS | Mod: CPTII,S$GLB,, | Performed by: FAMILY MEDICINE

## 2023-06-12 PROCEDURE — 3074F PR MOST RECENT SYSTOLIC BLOOD PRESSURE < 130 MM HG: ICD-10-PCS | Mod: CPTII,S$GLB,, | Performed by: FAMILY MEDICINE

## 2023-06-12 PROCEDURE — 99499 UNLISTED E&M SERVICE: CPT | Mod: HCNC,S$GLB,, | Performed by: FAMILY MEDICINE

## 2023-06-12 PROCEDURE — 1159F MED LIST DOCD IN RCRD: CPT | Mod: CPTII,S$GLB,, | Performed by: FAMILY MEDICINE

## 2023-06-12 PROCEDURE — 3288F PR FALLS RISK ASSESSMENT DOCUMENTED: ICD-10-PCS | Mod: CPTII,S$GLB,, | Performed by: FAMILY MEDICINE

## 2023-06-12 PROCEDURE — 1125F PR PAIN SEVERITY QUANTIFIED, PAIN PRESENT: ICD-10-PCS | Mod: CPTII,S$GLB,, | Performed by: FAMILY MEDICINE

## 2023-06-12 PROCEDURE — 1159F PR MEDICATION LIST DOCUMENTED IN MEDICAL RECORD: ICD-10-PCS | Mod: CPTII,S$GLB,, | Performed by: FAMILY MEDICINE

## 2023-06-12 PROCEDURE — 1125F AMNT PAIN NOTED PAIN PRSNT: CPT | Mod: CPTII,S$GLB,, | Performed by: FAMILY MEDICINE

## 2023-06-12 PROCEDURE — 99999 PR PBB SHADOW E&M-EST. PATIENT-LVL IV: ICD-10-PCS | Mod: PBBFAC,,, | Performed by: FAMILY MEDICINE

## 2023-06-12 PROCEDURE — 1160F PR REVIEW ALL MEDS BY PRESCRIBER/CLIN PHARMACIST DOCUMENTED: ICD-10-PCS | Mod: CPTII,S$GLB,, | Performed by: FAMILY MEDICINE

## 2023-06-12 PROCEDURE — 1160F RVW MEDS BY RX/DR IN RCRD: CPT | Mod: CPTII,S$GLB,, | Performed by: FAMILY MEDICINE

## 2023-06-12 PROCEDURE — 99999 PR PBB SHADOW E&M-EST. PATIENT-LVL IV: CPT | Mod: PBBFAC,,, | Performed by: FAMILY MEDICINE

## 2023-06-12 PROCEDURE — 3078F DIAST BP <80 MM HG: CPT | Mod: CPTII,S$GLB,, | Performed by: FAMILY MEDICINE

## 2023-06-12 PROCEDURE — 3288F FALL RISK ASSESSMENT DOCD: CPT | Mod: CPTII,S$GLB,, | Performed by: FAMILY MEDICINE

## 2023-06-12 NOTE — PROGRESS NOTES
Subjective:       Patient ID: Kelly Queen is a 78 y.o. female.    Chief Complaint: Annual Exam (Pt states that she is here for her annual exam )    78-year-old female coming in for annual exam.  She has no complaints at this time.  History includes hypertension taking lisinopril HCT 10/12.5 mg daily, hyperlipidemia on Crestor 20 mg daily, stage IIIA chronic kidney disease, prediabetes, and arthritis predominantly affecting the knees with a left total knee replacement.  She is not fasting for lab today.  She has been having some back aches and muscle pains that she relates to her mattress which she feels is too hard and she has a new mattress to be delivered tomorrow.  She otherwise is doing well.    Past Medical History:  No date: Arthritis  2/25/15: Carpal tunnel syndrome      Comment:  left; last injected 2/25/15  11/14/2014: Chronic kidney disease, stage III (moderate)  No date: Hypertension    Past Surgical History:  No date: APPENDECTOMY  3/30/2010: COLONOSCOPY      Comment:  Dr. Prince, ten year recheck  5/21/15: FEMUR FRACTURE SURGERY; Left      Comment:  DONNIE lund (L) distal femur  No date: FRACTURE SURGERY      Comment:  5- femer   No date: HYSTERECTOMY  6/7/13: JOINT REPLACEMENT; Left      Comment:  (L) TKA  No date: OOPHORECTOMY  No date: TONSILLECTOMY    Review of patient's family history indicates:    Social History    Tobacco Use      Smoking status: Never      Smokeless tobacco: Never    Alcohol use: No    Drug use: No    Current Outpatient Medications on File Prior to Visit:  acetaminophen (TYLENOL) 325 MG tablet, Take 650 mg by mouth every 6 (six) hours as needed for Pain., Disp: , Rfl:   aspirin 81 mg Tab, Take 1 tablet by mouth once daily. , Disp: , Rfl:   biotin 1,000 mcg Chew, Take 1 tablet by mouth once daily., Disp: , Rfl:   clotrimazole-betamethasone 1-0.05% (LOTRISONE) cream, Apply topically 2 (two) times daily., Disp: 45 g, Rfl: 1  lisinopriL-hydrochlorothiazide  (PRINZIDE,ZESTORETIC) 10-12.5 mg per tablet, TAKE 1 TABLET EVERY DAY, Disp: 90 tablet, Rfl: 0  multivitamin capsule, Take 1 capsule by mouth once daily., Disp: , Rfl:   rosuvastatin (CRESTOR) 20 MG tablet, TAKE 1 TABLET (20 MG TOTAL) BY MOUTH EVERY EVENING., Disp: 90 tablet, Rfl: 3  TURMERIC ORAL, Take 1 tablet by mouth once daily., Disp: , Rfl:   ZINC ACETATE ORAL, Take 30 mg by mouth., Disp: , Rfl:   [DISCONTINUED] predniSONE (DELTASONE) 20 MG tablet, One BID for 3 days then once a day orally, Disp: 10 tablet, Rfl: 0  [DISCONTINUED] sulfamethoxazole-trimethoprim 800-160mg (BACTRIM DS) 800-160 mg Tab, Take 1 tablet by mouth 2 (two) times daily., Disp: 14 tablet, Rfl: 0  cyanocobalamin 500 MCG tablet, Take 500 mcg by mouth once daily., Disp: , Rfl:     No current facility-administered medications on file prior to visit.        Review of Systems   Constitutional:  Negative for chills, diaphoresis, fatigue, fever and unexpected weight change.   HENT:  Negative for congestion, ear pain, hearing loss, postnasal drip and sinus pressure.    Eyes:  Negative for itching and visual disturbance.   Respiratory:  Negative for cough, chest tightness, shortness of breath and wheezing.    Cardiovascular:  Negative for chest pain, palpitations and leg swelling.   Gastrointestinal:  Negative for abdominal pain, blood in stool, constipation, diarrhea, nausea and vomiting.   Genitourinary:  Negative for dysuria, frequency and hematuria.   Musculoskeletal:  Positive for arthralgias. Negative for back pain, joint swelling and myalgias.   Neurological:  Negative for dizziness and headaches.   Hematological:  Negative for adenopathy.   Psychiatric/Behavioral:  Negative for sleep disturbance. The patient is not nervous/anxious.      Objective:      Physical Exam  Vitals and nursing note reviewed.   Constitutional:       General: She is not in acute distress.     Appearance: Normal appearance. She is well-developed. She is not  ill-appearing, toxic-appearing or diaphoretic.      Comments: Good blood pressure control  Normal pulse with a regular rhythm  Mildly overweight with a BMI of 29.9 she is up 4.9 lb from her last visit with me June 9, 2022   HENT:      Head: Normocephalic and atraumatic.      Right Ear: Tympanic membrane, ear canal and external ear normal. There is no impacted cerumen.      Left Ear: Tympanic membrane, ear canal and external ear normal. There is no impacted cerumen.      Ears:      Comments: Wears hearing aids bilaterally     Nose: Nose normal. No congestion or rhinorrhea.      Mouth/Throat:      Mouth: Mucous membranes are moist.      Pharynx: Oropharynx is clear. No oropharyngeal exudate or posterior oropharyngeal erythema.   Eyes:      General: No scleral icterus.        Right eye: No discharge.         Left eye: No discharge.      Extraocular Movements: Extraocular movements intact.      Conjunctiva/sclera: Conjunctivae normal.      Pupils: Pupils are equal, round, and reactive to light.   Neck:      Thyroid: No thyromegaly.      Vascular: No carotid bruit or JVD.   Cardiovascular:      Rate and Rhythm: Normal rate and regular rhythm.      Heart sounds: Normal heart sounds. No murmur heard.    No friction rub. No gallop.   Pulmonary:      Effort: Pulmonary effort is normal. No respiratory distress.      Breath sounds: Normal breath sounds. No stridor. No wheezing, rhonchi or rales.   Chest:      Chest wall: No tenderness.   Abdominal:      General: Bowel sounds are normal. There is no distension.      Palpations: Abdomen is soft. There is no mass.      Tenderness: There is no abdominal tenderness. There is no guarding or rebound.      Hernia: No hernia is present.   Musculoskeletal:         General: No swelling, tenderness, deformity or signs of injury. Normal range of motion.      Cervical back: Normal range of motion and neck supple. No rigidity or tenderness.      Right lower leg: No edema.      Left lower  leg: No edema.   Lymphadenopathy:      Cervical: No cervical adenopathy.   Skin:     General: Skin is warm and dry.      Coloration: Skin is not jaundiced or pale.      Findings: No bruising, erythema, lesion or rash.   Neurological:      General: No focal deficit present.      Mental Status: She is alert and oriented to person, place, and time. Mental status is at baseline.      Cranial Nerves: No cranial nerve deficit.      Sensory: No sensory deficit.      Motor: No weakness.      Coordination: Coordination normal.      Gait: Gait normal.      Deep Tendon Reflexes: Reflexes are normal and symmetric. Reflexes normal.   Psychiatric:         Mood and Affect: Mood normal.         Behavior: Behavior normal.         Thought Content: Thought content normal.         Judgment: Judgment normal.       Assessment:       1. Encounter for preventive health examination    2. Primary hypertension    3. Dyslipidemia    4. Stage 3a chronic kidney disease    5. Prediabetes    6. Primary osteoarthritis of both knees    7. BMI 29.0-29.9,adult        Plan:       1. Encounter for preventive health examination  Normal exam, slightly overweight for age  - Comprehensive Metabolic Panel; Future  - Hemoglobin A1C; Future  - Lipid Panel; Future  - CBC Auto Differential; Future    2. Primary hypertension  Good blood pressure control with no changes needed in lisinopril HCT  - Comprehensive Metabolic Panel; Future  - Lipid Panel; Future  - CBC Auto Differential; Future    3. Dyslipidemia  Await lipid panel to be scheduled fasting for possible change in Crestor 20  - Comprehensive Metabolic Panel; Future  - Lipid Panel; Future    4. Stage 3a chronic kidney disease  Await chemistry panel to be scheduled fasting  - Comprehensive Metabolic Panel; Future    5. Prediabetes  Await chemistry panel and A1c to be scheduled fasting  - Comprehensive Metabolic Panel; Future  - Hemoglobin A1C; Future    6. Primary osteoarthritis of both knees  Status post  left total knee in June of 2013    7. BMI 29.0-29.9,adult  Suggest mild calorie reduction in attempt to increase low-impact exercise    Discussed shingles vaccine risks and benefits.  She will possibly have it done at [email or web address]

## 2023-06-14 ENCOUNTER — LAB VISIT (OUTPATIENT)
Dept: LAB | Facility: HOSPITAL | Age: 78
End: 2023-06-14
Attending: FAMILY MEDICINE
Payer: MEDICARE

## 2023-06-14 DIAGNOSIS — N18.31 STAGE 3A CHRONIC KIDNEY DISEASE: ICD-10-CM

## 2023-06-14 DIAGNOSIS — E78.5 DYSLIPIDEMIA: ICD-10-CM

## 2023-06-14 DIAGNOSIS — I10 PRIMARY HYPERTENSION: ICD-10-CM

## 2023-06-14 DIAGNOSIS — R73.03 PREDIABETES: ICD-10-CM

## 2023-06-14 DIAGNOSIS — Z00.00 ENCOUNTER FOR PREVENTIVE HEALTH EXAMINATION: ICD-10-CM

## 2023-06-14 LAB
ALBUMIN SERPL BCP-MCNC: 3.9 G/DL (ref 3.5–5.2)
ALP SERPL-CCNC: 47 U/L (ref 55–135)
ALT SERPL W/O P-5'-P-CCNC: 14 U/L (ref 10–44)
ANION GAP SERPL CALC-SCNC: 10 MMOL/L (ref 8–16)
AST SERPL-CCNC: 19 U/L (ref 10–40)
BASOPHILS # BLD AUTO: 0.07 K/UL (ref 0–0.2)
BASOPHILS NFR BLD: 0.9 % (ref 0–1.9)
BILIRUB SERPL-MCNC: 0.5 MG/DL (ref 0.1–1)
BUN SERPL-MCNC: 16 MG/DL (ref 8–23)
CALCIUM SERPL-MCNC: 9.2 MG/DL (ref 8.7–10.5)
CHLORIDE SERPL-SCNC: 107 MMOL/L (ref 95–110)
CHOLEST SERPL-MCNC: 135 MG/DL (ref 120–199)
CHOLEST/HDLC SERPL: 2.2 {RATIO} (ref 2–5)
CO2 SERPL-SCNC: 26 MMOL/L (ref 23–29)
CREAT SERPL-MCNC: 0.9 MG/DL (ref 0.5–1.4)
DIFFERENTIAL METHOD: ABNORMAL
EOSINOPHIL # BLD AUTO: 0.6 K/UL (ref 0–0.5)
EOSINOPHIL NFR BLD: 8.4 % (ref 0–8)
ERYTHROCYTE [DISTWIDTH] IN BLOOD BY AUTOMATED COUNT: 13.8 % (ref 11.5–14.5)
EST. GFR  (NO RACE VARIABLE): >60 ML/MIN/1.73 M^2
ESTIMATED AVG GLUCOSE: 120 MG/DL (ref 68–131)
GLUCOSE SERPL-MCNC: 99 MG/DL (ref 70–110)
HBA1C MFR BLD: 5.8 % (ref 4–5.6)
HCT VFR BLD AUTO: 40 % (ref 37–48.5)
HDLC SERPL-MCNC: 62 MG/DL (ref 40–75)
HDLC SERPL: 45.9 % (ref 20–50)
HGB BLD-MCNC: 12.8 G/DL (ref 12–16)
IMM GRANULOCYTES # BLD AUTO: 0.02 K/UL (ref 0–0.04)
IMM GRANULOCYTES NFR BLD AUTO: 0.3 % (ref 0–0.5)
LDLC SERPL CALC-MCNC: 57 MG/DL (ref 63–159)
LYMPHOCYTES # BLD AUTO: 2.5 K/UL (ref 1–4.8)
LYMPHOCYTES NFR BLD: 33.6 % (ref 18–48)
MCH RBC QN AUTO: 30.7 PG (ref 27–31)
MCHC RBC AUTO-ENTMCNC: 32 G/DL (ref 32–36)
MCV RBC AUTO: 96 FL (ref 82–98)
MONOCYTES # BLD AUTO: 0.6 K/UL (ref 0.3–1)
MONOCYTES NFR BLD: 8.5 % (ref 4–15)
NEUTROPHILS # BLD AUTO: 3.6 K/UL (ref 1.8–7.7)
NEUTROPHILS NFR BLD: 48.3 % (ref 38–73)
NONHDLC SERPL-MCNC: 73 MG/DL
NRBC BLD-RTO: 0 /100 WBC
PLATELET # BLD AUTO: 247 K/UL (ref 150–450)
PMV BLD AUTO: 11 FL (ref 9.2–12.9)
POTASSIUM SERPL-SCNC: 4 MMOL/L (ref 3.5–5.1)
PROT SERPL-MCNC: 6.4 G/DL (ref 6–8.4)
RBC # BLD AUTO: 4.17 M/UL (ref 4–5.4)
SODIUM SERPL-SCNC: 143 MMOL/L (ref 136–145)
TRIGL SERPL-MCNC: 80 MG/DL (ref 30–150)
WBC # BLD AUTO: 7.42 K/UL (ref 3.9–12.7)

## 2023-06-14 PROCEDURE — 83036 HEMOGLOBIN GLYCOSYLATED A1C: CPT | Performed by: FAMILY MEDICINE

## 2023-06-14 PROCEDURE — 85025 COMPLETE CBC W/AUTO DIFF WBC: CPT | Performed by: FAMILY MEDICINE

## 2023-06-14 PROCEDURE — 80053 COMPREHEN METABOLIC PANEL: CPT | Performed by: FAMILY MEDICINE

## 2023-06-14 PROCEDURE — 80061 LIPID PANEL: CPT | Performed by: FAMILY MEDICINE

## 2023-06-14 PROCEDURE — 36415 COLL VENOUS BLD VENIPUNCTURE: CPT | Mod: PO | Performed by: FAMILY MEDICINE

## 2023-07-20 ENCOUNTER — TELEPHONE (OUTPATIENT)
Dept: FAMILY MEDICINE | Facility: CLINIC | Age: 78
End: 2023-07-20
Payer: MEDICARE

## 2023-07-20 NOTE — TELEPHONE ENCOUNTER
Called patient- she has appt 7/21/23 and notified to keep it since no appts available today. She hurt hip and is walking without problems. No head injury.

## 2023-07-20 NOTE — TELEPHONE ENCOUNTER
----- Message from Charles Mc sent at 7/20/2023 11:03 AM CDT -----  Contact: self  Type: Sooner Appointment Request        Caller is requesting a sooner appointment. Caller declined first available appointment listed below. Caller will not accept being placed on the waitlist and is requesting a message be sent to doctor.        Name of Caller: patient   Best Call Back Number: [phone]  Additional Information: Pt needs to get into be seen took a fall this morning. Plz call to get scheduled. Thanks

## 2023-07-20 NOTE — TELEPHONE ENCOUNTER
----- Message from Charles Mc sent at 7/20/2023 11:03 AM CDT -----  Contact: self  Type: Sooner Appointment Request        Caller is requesting a sooner appointment. Caller declined first available appointment listed below. Caller will not accept being placed on the waitlist and is requesting a message be sent to doctor.        Name of Caller: patient   Best Call Back Number: [phone]  Additional Information: Pt needs to get into be seen took a fall this morning. Plz call to get scheduled. Thanks

## 2023-07-21 ENCOUNTER — OFFICE VISIT (OUTPATIENT)
Dept: FAMILY MEDICINE | Facility: CLINIC | Age: 78
End: 2023-07-21
Payer: MEDICARE

## 2023-07-21 ENCOUNTER — HOSPITAL ENCOUNTER (OUTPATIENT)
Dept: RADIOLOGY | Facility: CLINIC | Age: 78
Discharge: HOME | End: 2023-07-21
Attending: NURSE PRACTITIONER
Payer: MEDICARE

## 2023-07-21 VITALS
BODY MASS INDEX: 30.22 KG/M2 | WEIGHT: 164.25 LBS | HEIGHT: 62 IN | TEMPERATURE: 98 F | OXYGEN SATURATION: 97 % | DIASTOLIC BLOOD PRESSURE: 76 MMHG | HEART RATE: 83 BPM | SYSTOLIC BLOOD PRESSURE: 136 MMHG
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
## 2023-07-21 DIAGNOSIS — T14.90XA TRAUMA: ICD-10-CM

## 2023-07-21 DIAGNOSIS — M25.551 RIGHT HIP PAIN: ICD-10-CM

## 2023-07-21 DIAGNOSIS — M25.551 RIGHT HIP PAIN: Primary | ICD-10-CM

## 2023-07-21 PROCEDURE — 1100F PR PT FALLS ASSESS DOC 2+ FALLS/FALL W/INJURY/YR: ICD-10-PCS | Mod: HCNC,CPTII,S$GLB, | Performed by: NURSE PRACTITIONER

## 2023-07-21 PROCEDURE — 99214 PR OFFICE/OUTPT VISIT, EST, LEVL IV, 30-39 MIN: ICD-10-PCS | Mod: HCNC,S$GLB,, | Performed by: NURSE PRACTITIONER

## 2023-07-21 PROCEDURE — 73502 X-RAY EXAM HIP UNI 2-3 VIEWS: CPT | Mod: TC,HCNC,FY,PO,RT

## 2023-07-21 PROCEDURE — 73502 XR HIP WITH PELVIS WHEN PERFORMED, 2 OR 3  VIEWS RIGHT: ICD-10-PCS | Mod: 26,HCNC,RT,S$GLB | Performed by: RADIOLOGY

## 2023-07-21 PROCEDURE — 1125F AMNT PAIN NOTED PAIN PRSNT: CPT | Mod: HCNC,CPTII,S$GLB, | Performed by: NURSE PRACTITIONER

## 2023-07-21 PROCEDURE — 3075F PR MOST RECENT SYSTOLIC BLOOD PRESS GE 130-139MM HG: ICD-10-PCS | Mod: HCNC,CPTII,S$GLB, | Performed by: NURSE PRACTITIONER

## 2023-07-21 PROCEDURE — 99999 PR PBB SHADOW E&M-EST. PATIENT-LVL III: ICD-10-PCS | Mod: PBBFAC,HCNC,, | Performed by: NURSE PRACTITIONER

## 2023-07-21 PROCEDURE — 1159F MED LIST DOCD IN RCRD: CPT | Mod: HCNC,CPTII,S$GLB, | Performed by: NURSE PRACTITIONER

## 2023-07-21 PROCEDURE — 3078F DIAST BP <80 MM HG: CPT | Mod: HCNC,CPTII,S$GLB, | Performed by: NURSE PRACTITIONER

## 2023-07-21 PROCEDURE — 1159F PR MEDICATION LIST DOCUMENTED IN MEDICAL RECORD: ICD-10-PCS | Mod: HCNC,CPTII,S$GLB, | Performed by: NURSE PRACTITIONER

## 2023-07-21 PROCEDURE — 3288F PR FALLS RISK ASSESSMENT DOCUMENTED: ICD-10-PCS | Mod: HCNC,CPTII,S$GLB, | Performed by: NURSE PRACTITIONER

## 2023-07-21 PROCEDURE — 1100F PTFALLS ASSESS-DOCD GE2>/YR: CPT | Mod: HCNC,CPTII,S$GLB, | Performed by: NURSE PRACTITIONER

## 2023-07-21 PROCEDURE — 3075F SYST BP GE 130 - 139MM HG: CPT | Mod: HCNC,CPTII,S$GLB, | Performed by: NURSE PRACTITIONER

## 2023-07-21 PROCEDURE — 73502 X-RAY EXAM HIP UNI 2-3 VIEWS: CPT | Mod: 26,HCNC,RT,S$GLB | Performed by: RADIOLOGY

## 2023-07-21 PROCEDURE — 99214 OFFICE O/P EST MOD 30 MIN: CPT | Mod: HCNC,S$GLB,, | Performed by: NURSE PRACTITIONER

## 2023-07-21 PROCEDURE — 1125F PR PAIN SEVERITY QUANTIFIED, PAIN PRESENT: ICD-10-PCS | Mod: HCNC,CPTII,S$GLB, | Performed by: NURSE PRACTITIONER

## 2023-07-21 PROCEDURE — 3078F PR MOST RECENT DIASTOLIC BLOOD PRESSURE < 80 MM HG: ICD-10-PCS | Mod: HCNC,CPTII,S$GLB, | Performed by: NURSE PRACTITIONER

## 2023-07-21 PROCEDURE — 3288F FALL RISK ASSESSMENT DOCD: CPT | Mod: HCNC,CPTII,S$GLB, | Performed by: NURSE PRACTITIONER

## 2023-07-21 PROCEDURE — 99999 PR PBB SHADOW E&M-EST. PATIENT-LVL III: CPT | Mod: PBBFAC,HCNC,, | Performed by: NURSE PRACTITIONER

## 2023-07-21 RX ORDER — VITAMIN B COMPLEX
1 CAPSULE ORAL DAILY
Status: ON HOLD | COMMUNITY

## 2023-07-21 NOTE — PROGRESS NOTES
This dictation has been generated using Modal Fluency Dictation some phonetic errors may occur. Please contact author for clarification if needed.     Problem List Items Addressed This Visit    None  Visit Diagnoses       Right hip pain    -  Primary    Relevant Orders    X-Ray Hip 2 or 3 views Right (with Pelvis when performed)    Fall, initial encounter        Relevant Orders    X-Ray Hip 2 or 3 views Right (with Pelvis when performed)    Trauma        Relevant Orders    X-Ray Hip 2 or 3 views Right (with Pelvis when performed)            Orders Placed This Encounter    X-Ray Hip 2 or 3 views Right (with Pelvis when performed)     Fall right hip pain.  Check x-ray doubtful of fracture but history of trauma and patient with risk factors.    No follow-ups on file.    ________________________________________________________________  ________________________________________________________________      Chief Complaint   Patient presents with    Hip Pain     History of present illness  This 78 y.o. presents today for complaint of fall and right hip pain.  Notes some bruise over her hip.  She fell yesterday.  She did not hit her head.  Notes some right hip pain.  She is been putting ice on it taking Advil with relief.  She slept well last night.  Notes bruise lateral right hip and slightly posterior.  No numbness or pain distal injury.  No bowel or bladder changes.      Past Medical History:   Diagnosis Date    Arthritis     Carpal tunnel syndrome 2/25/15    left; last injected 2/25/15    Chronic kidney disease, stage III (moderate) 11/14/2014    Hypertension        Past Surgical History:   Procedure Laterality Date    APPENDECTOMY      COLONOSCOPY  3/30/2010    Dr. Prince, ten year recheck    FEMUR FRACTURE SURGERY Left 5/21/15    IM rodding (L) distal femur    FRACTURE SURGERY      5- femer     HYSTERECTOMY      JOINT REPLACEMENT Left 6/7/13    (L) TKA    OOPHORECTOMY      TONSILLECTOMY         Family History    Problem Relation Age of Onset    Diabetes Sister     Cancer Sister         colon    Hypertension Sister     Diabetes Brother     Cancer Brother         lung    Basal cell carcinoma Daughter     Skin cancer Daughter         squnamus     Hypertension Mother     No Known Problems Son     Diabetes Brother     Heart disease Brother        Social History     Socioeconomic History    Marital status:    Tobacco Use    Smoking status: Never    Smokeless tobacco: Never   Substance and Sexual Activity    Alcohol use: No    Drug use: No       Current Outpatient Medications   Medication Sig Dispense Refill    aspirin 81 mg Tab Take 1 tablet by mouth once daily.       b complex vitamins capsule Take 1 capsule by mouth once daily.      biotin 1,000 mcg Chew Take 1 tablet by mouth once daily.      clotrimazole-betamethasone 1-0.05% (LOTRISONE) cream Apply topically 2 (two) times daily. 45 g 1    lisinopriL-hydrochlorothiazide (PRINZIDE,ZESTORETIC) 10-12.5 mg per tablet TAKE 1 TABLET EVERY DAY 90 tablet 3    multivitamin capsule Take 1 capsule by mouth once daily.      rosuvastatin (CRESTOR) 20 MG tablet TAKE 1 TABLET BY MOUTH EVERY EVENING. 90 tablet 3    TURMERIC ORAL Take 1 tablet by mouth once daily.       No current facility-administered medications for this visit.       Review of patient's allergies indicates:   Allergen Reactions    Codeine      Other reaction(s): pt does not want to take       Physical examination  Vitals Reviewed\  Vitals:    07/21/23 1433   BP: 136/76   Pulse: 83   Temp: 98.4 °F (36.9 °C)     Body mass index is 30.04 kg/m².   Gen. Well-dressed well-nourished   Skin warm dry and intact.  No rashes noted.  Neuro. Awake alert oriented x4.  Normal judgment and cognition noted.  Extremities no clubbing cyanosis or edema noted.     Call or return to clinic prn if these symptoms worsen or fail to improve as anticipated.

## 2023-10-26 ENCOUNTER — OFFICE VISIT (OUTPATIENT)
Dept: FAMILY MEDICINE | Facility: CLINIC | Age: 78
End: 2023-10-26
Payer: MEDICARE

## 2023-10-26 VITALS
SYSTOLIC BLOOD PRESSURE: 140 MMHG | WEIGHT: 166.69 LBS | TEMPERATURE: 98 F | OXYGEN SATURATION: 98 % | HEIGHT: 62 IN | BODY MASS INDEX: 30.67 KG/M2 | HEART RATE: 80 BPM | DIASTOLIC BLOOD PRESSURE: 60 MMHG

## 2023-10-26 DIAGNOSIS — M54.50 ACUTE MIDLINE LOW BACK PAIN WITHOUT SCIATICA: Primary | ICD-10-CM

## 2023-10-26 LAB
BILIRUB UR QL STRIP: NEGATIVE
CLARITY UR REFRACT.AUTO: CLEAR
COLOR UR AUTO: YELLOW
GLUCOSE UR QL STRIP: NEGATIVE
HGB UR QL STRIP: NEGATIVE
KETONES UR QL STRIP: NEGATIVE
LEUKOCYTE ESTERASE UR QL STRIP: ABNORMAL
MICROSCOPIC COMMENT: NORMAL
NITRITE UR QL STRIP: NEGATIVE
PH UR STRIP: 7 [PH] (ref 5–8)
PROT UR QL STRIP: NEGATIVE
RBC #/AREA URNS AUTO: 1 /HPF (ref 0–4)
SP GR UR STRIP: 1.02 (ref 1–1.03)
SQUAMOUS #/AREA URNS AUTO: 0 /HPF
URN SPEC COLLECT METH UR: ABNORMAL
WBC #/AREA URNS AUTO: 4 /HPF (ref 0–5)

## 2023-10-26 PROCEDURE — 99213 OFFICE O/P EST LOW 20 MIN: CPT | Mod: HCNC,S$GLB,, | Performed by: NURSE PRACTITIONER

## 2023-10-26 PROCEDURE — 87086 URINE CULTURE/COLONY COUNT: CPT | Mod: HCNC | Performed by: NURSE PRACTITIONER

## 2023-10-26 PROCEDURE — 1125F AMNT PAIN NOTED PAIN PRSNT: CPT | Mod: HCNC,CPTII,S$GLB, | Performed by: NURSE PRACTITIONER

## 2023-10-26 PROCEDURE — 3077F SYST BP >= 140 MM HG: CPT | Mod: HCNC,CPTII,S$GLB, | Performed by: NURSE PRACTITIONER

## 2023-10-26 PROCEDURE — 99213 PR OFFICE/OUTPT VISIT, EST, LEVL III, 20-29 MIN: ICD-10-PCS | Mod: HCNC,S$GLB,, | Performed by: NURSE PRACTITIONER

## 2023-10-26 PROCEDURE — 81001 URINALYSIS AUTO W/SCOPE: CPT | Mod: HCNC | Performed by: NURSE PRACTITIONER

## 2023-10-26 PROCEDURE — 3288F FALL RISK ASSESSMENT DOCD: CPT | Mod: HCNC,CPTII,S$GLB, | Performed by: NURSE PRACTITIONER

## 2023-10-26 PROCEDURE — 3078F DIAST BP <80 MM HG: CPT | Mod: HCNC,CPTII,S$GLB, | Performed by: NURSE PRACTITIONER

## 2023-10-26 PROCEDURE — 99999 PR PBB SHADOW E&M-EST. PATIENT-LVL IV: ICD-10-PCS | Mod: PBBFAC,HCNC,, | Performed by: NURSE PRACTITIONER

## 2023-10-26 PROCEDURE — 3288F PR FALLS RISK ASSESSMENT DOCUMENTED: ICD-10-PCS | Mod: HCNC,CPTII,S$GLB, | Performed by: NURSE PRACTITIONER

## 2023-10-26 PROCEDURE — 3077F PR MOST RECENT SYSTOLIC BLOOD PRESSURE >= 140 MM HG: ICD-10-PCS | Mod: HCNC,CPTII,S$GLB, | Performed by: NURSE PRACTITIONER

## 2023-10-26 PROCEDURE — 1101F PT FALLS ASSESS-DOCD LE1/YR: CPT | Mod: HCNC,CPTII,S$GLB, | Performed by: NURSE PRACTITIONER

## 2023-10-26 PROCEDURE — 1159F MED LIST DOCD IN RCRD: CPT | Mod: HCNC,CPTII,S$GLB, | Performed by: NURSE PRACTITIONER

## 2023-10-26 PROCEDURE — 99999 PR PBB SHADOW E&M-EST. PATIENT-LVL IV: CPT | Mod: PBBFAC,HCNC,, | Performed by: NURSE PRACTITIONER

## 2023-10-26 PROCEDURE — 1160F PR REVIEW ALL MEDS BY PRESCRIBER/CLIN PHARMACIST DOCUMENTED: ICD-10-PCS | Mod: HCNC,CPTII,S$GLB, | Performed by: NURSE PRACTITIONER

## 2023-10-26 PROCEDURE — 1160F RVW MEDS BY RX/DR IN RCRD: CPT | Mod: HCNC,CPTII,S$GLB, | Performed by: NURSE PRACTITIONER

## 2023-10-26 PROCEDURE — 3078F PR MOST RECENT DIASTOLIC BLOOD PRESSURE < 80 MM HG: ICD-10-PCS | Mod: HCNC,CPTII,S$GLB, | Performed by: NURSE PRACTITIONER

## 2023-10-26 PROCEDURE — 1125F PR PAIN SEVERITY QUANTIFIED, PAIN PRESENT: ICD-10-PCS | Mod: HCNC,CPTII,S$GLB, | Performed by: NURSE PRACTITIONER

## 2023-10-26 PROCEDURE — 1101F PR PT FALLS ASSESS DOC 0-1 FALLS W/OUT INJ PAST YR: ICD-10-PCS | Mod: HCNC,CPTII,S$GLB, | Performed by: NURSE PRACTITIONER

## 2023-10-26 PROCEDURE — 1159F PR MEDICATION LIST DOCUMENTED IN MEDICAL RECORD: ICD-10-PCS | Mod: HCNC,CPTII,S$GLB, | Performed by: NURSE PRACTITIONER

## 2023-10-26 RX ORDER — MELOXICAM 7.5 MG/1
7.5 TABLET ORAL DAILY
Qty: 30 TABLET | Refills: 0 | Status: SHIPPED | OUTPATIENT
Start: 2023-10-26 | End: 2023-11-03

## 2023-10-26 RX ORDER — MELOXICAM 7.5 MG/1
7.5 TABLET ORAL DAILY
Qty: 30 TABLET | Refills: 0 | Status: SHIPPED | OUTPATIENT
Start: 2023-10-26 | End: 2023-10-26 | Stop reason: SDUPTHER

## 2023-10-26 RX ORDER — TIZANIDINE 4 MG/1
4 TABLET ORAL 3 TIMES DAILY PRN
Qty: 30 TABLET | Refills: 0 | Status: SHIPPED | OUTPATIENT
Start: 2023-10-26 | End: 2023-11-05

## 2023-10-26 RX ORDER — TIZANIDINE 4 MG/1
4 TABLET ORAL 3 TIMES DAILY PRN
Qty: 30 TABLET | Refills: 0 | Status: SHIPPED | OUTPATIENT
Start: 2023-10-26 | End: 2023-10-26 | Stop reason: SDUPTHER

## 2023-10-26 NOTE — PROGRESS NOTES
Subjective:       Patient ID: Kelly Queen is a 78 y.o. female.    Chief Complaint: Back Pain     HPI   77 y/o female patient with medical problems listed below presents for low back pain since this Friday. Denies recent trauma to the affected area. She states felt the pain when she was walking this Friday. Describe pain as dull, pain 10/10 in intensity, worse on movement, not associated with nausea, vomiting, abdominal pain, bowel symptoms, fever, chills. States took aleve with mild relief. She states she wakes up at night to go to bathroom. Denies dysuria, hematuria.     Labs reviewed from 6/2023    Patient Active Problem List   Diagnosis    Hypertension    Arthritis    Stage 3a chronic kidney disease    Prediabetes    Dyslipidemia    Primary osteoarthritis of both knees    BMI 30.0-30.9,adult      Review of patient's allergies indicates:   Allergen Reactions    Codeine      Other reaction(s): pt does not want to take     Past Surgical History:   Procedure Laterality Date    APPENDECTOMY      COLONOSCOPY  3/30/2010    Dr. Prince, ten year recheck    FEMUR FRACTURE SURGERY Left 5/21/15    IM rodding (L) distal femur    FRACTURE SURGERY      5- femer     HYSTERECTOMY      JOINT REPLACEMENT Left 6/7/13    (L) TKA    OOPHORECTOMY      TONSILLECTOMY          Current Outpatient Medications:     aspirin 81 mg Tab, Take 1 tablet by mouth once daily. , Disp: , Rfl:     b complex vitamins capsule, Take 1 capsule by mouth once daily., Disp: , Rfl:     biotin 1,000 mcg Chew, Take 1 tablet by mouth once daily., Disp: , Rfl:     clotrimazole-betamethasone 1-0.05% (LOTRISONE) cream, Apply topically 2 (two) times daily., Disp: 45 g, Rfl: 1    lisinopriL-hydrochlorothiazide (PRINZIDE,ZESTORETIC) 10-12.5 mg per tablet, TAKE 1 TABLET EVERY DAY, Disp: 90 tablet, Rfl: 3    multivitamin capsule, Take 1 capsule by mouth once daily., Disp: , Rfl:     rosuvastatin (CRESTOR) 20 MG tablet, TAKE 1 TABLET BY MOUTH EVERY EVENING.,  "Disp: 90 tablet, Rfl: 3    TURMERIC ORAL, Take 1 tablet by mouth once daily., Disp: , Rfl:     meloxicam (MOBIC) 7.5 MG tablet, Take 1 tablet (7.5 mg total) by mouth once daily., Disp: 30 tablet, Rfl: 0    tiZANidine (ZANAFLEX) 4 MG tablet, Take 1 tablet (4 mg total) by mouth 3 (three) times daily as needed (pain)., Disp: 30 tablet, Rfl: 0    Review of Systems   Constitutional:  Negative for chills and fever.   Respiratory:  Negative for cough, chest tightness and shortness of breath.    Cardiovascular:  Negative for chest pain and palpitations.   Gastrointestinal:  Negative for abdominal pain, nausea and vomiting.   Genitourinary:  Negative for dysuria.   Musculoskeletal:  Positive for back pain.   Neurological:  Negative for dizziness and headaches.       Objective:   BP (!) 140/60 (BP Location: Right arm, Patient Position: Sitting, BP Method: Large (Manual))   Pulse 80   Temp 98.4 °F (36.9 °C) (Oral)   Ht 5' 2" (1.575 m)   Wt 75.6 kg (166 lb 10.7 oz)   SpO2 98%   BMI 30.48 kg/m²         Physical Exam  Constitutional:       General: She is not in acute distress.     Appearance: Normal appearance.   HENT:      Head: Atraumatic.   Cardiovascular:      Rate and Rhythm: Normal rate and regular rhythm.      Pulses: Normal pulses.      Heart sounds: Normal heart sounds.   Pulmonary:      Effort: Pulmonary effort is normal.      Breath sounds: Normal breath sounds.   Abdominal:      General: Abdomen is flat. Bowel sounds are normal.      Palpations: Abdomen is soft.      Tenderness: There is no abdominal tenderness.   Musculoskeletal:      Comments: +Tenderness in b/l low mid back   Neurological:      Mental Status: She is oriented to person, place, and time.         Assessment:       1. Acute midline low back pain without sciatica        Plan:       1. Acute midline low back pain without sciatica  - X-Ray Lumbar Spine AP And Lateral; Future  - Urinalysis  - Urine culture  - tiZANidine (ZANAFLEX) 4 MG tablet; Take " 1 tablet (4 mg total) by mouth 3 (three) times daily as needed (pain).  Dispense: 30 tablet; Refill: 0  - meloxicam (MOBIC) 7.5 MG tablet; Take 1 tablet (7.5 mg total) by mouth once daily.  Dispense: 30 tablet; Refill: 0     Will follow up with result  Rosa MAGALLON

## 2023-10-27 ENCOUNTER — PATIENT MESSAGE (OUTPATIENT)
Dept: FAMILY MEDICINE | Facility: CLINIC | Age: 78
End: 2023-10-27
Payer: MEDICARE

## 2023-10-27 LAB — BACTERIA UR CULT: NO GROWTH

## 2023-10-30 ENCOUNTER — HOSPITAL ENCOUNTER (OUTPATIENT)
Dept: RADIOLOGY | Facility: CLINIC | Age: 78
Discharge: HOME | End: 2023-10-30
Attending: NURSE PRACTITIONER
Payer: MEDICARE

## 2023-10-30 DIAGNOSIS — M54.50 ACUTE MIDLINE LOW BACK PAIN WITHOUT SCIATICA: ICD-10-CM

## 2023-10-30 DIAGNOSIS — M54.50 ACUTE MIDLINE LOW BACK PAIN WITHOUT SCIATICA: Primary | ICD-10-CM

## 2023-10-30 PROCEDURE — 72100 X-RAY EXAM L-S SPINE 2/3 VWS: CPT | Mod: TC,HCNC,FY,PO

## 2023-10-30 PROCEDURE — 72100 X-RAY EXAM L-S SPINE 2/3 VWS: CPT | Mod: 26,HCNC,, | Performed by: RADIOLOGY

## 2023-10-30 PROCEDURE — 72100 XR LUMBAR SPINE AP AND LATERAL: ICD-10-PCS | Mod: 26,HCNC,, | Performed by: RADIOLOGY

## 2023-10-31 ENCOUNTER — TELEPHONE (OUTPATIENT)
Dept: FAMILY MEDICINE | Facility: CLINIC | Age: 78
End: 2023-10-31
Payer: MEDICARE

## 2023-11-03 ENCOUNTER — OFFICE VISIT (OUTPATIENT)
Dept: SPINE | Facility: CLINIC | Age: 78
End: 2023-11-03
Payer: MEDICARE

## 2023-11-03 VITALS — BODY MASS INDEX: 30.67 KG/M2 | WEIGHT: 166.69 LBS | HEIGHT: 62 IN

## 2023-11-03 DIAGNOSIS — M54.50 ACUTE MIDLINE LOW BACK PAIN WITHOUT SCIATICA: ICD-10-CM

## 2023-11-03 PROCEDURE — 1160F RVW MEDS BY RX/DR IN RCRD: CPT | Mod: HCNC,CPTII,S$GLB, | Performed by: PHYSICAL MEDICINE & REHABILITATION

## 2023-11-03 PROCEDURE — 1125F AMNT PAIN NOTED PAIN PRSNT: CPT | Mod: HCNC,CPTII,S$GLB, | Performed by: PHYSICAL MEDICINE & REHABILITATION

## 2023-11-03 PROCEDURE — 99204 PR OFFICE/OUTPT VISIT, NEW, LEVL IV, 45-59 MIN: ICD-10-PCS | Mod: HCNC,S$GLB,, | Performed by: PHYSICAL MEDICINE & REHABILITATION

## 2023-11-03 PROCEDURE — 1101F PR PT FALLS ASSESS DOC 0-1 FALLS W/OUT INJ PAST YR: ICD-10-PCS | Mod: HCNC,CPTII,S$GLB, | Performed by: PHYSICAL MEDICINE & REHABILITATION

## 2023-11-03 PROCEDURE — 3288F PR FALLS RISK ASSESSMENT DOCUMENTED: ICD-10-PCS | Mod: HCNC,CPTII,S$GLB, | Performed by: PHYSICAL MEDICINE & REHABILITATION

## 2023-11-03 PROCEDURE — 3288F FALL RISK ASSESSMENT DOCD: CPT | Mod: HCNC,CPTII,S$GLB, | Performed by: PHYSICAL MEDICINE & REHABILITATION

## 2023-11-03 PROCEDURE — 1159F PR MEDICATION LIST DOCUMENTED IN MEDICAL RECORD: ICD-10-PCS | Mod: HCNC,CPTII,S$GLB, | Performed by: PHYSICAL MEDICINE & REHABILITATION

## 2023-11-03 PROCEDURE — 1160F PR REVIEW ALL MEDS BY PRESCRIBER/CLIN PHARMACIST DOCUMENTED: ICD-10-PCS | Mod: HCNC,CPTII,S$GLB, | Performed by: PHYSICAL MEDICINE & REHABILITATION

## 2023-11-03 PROCEDURE — 1125F PR PAIN SEVERITY QUANTIFIED, PAIN PRESENT: ICD-10-PCS | Mod: HCNC,CPTII,S$GLB, | Performed by: PHYSICAL MEDICINE & REHABILITATION

## 2023-11-03 PROCEDURE — 99204 OFFICE O/P NEW MOD 45 MIN: CPT | Mod: HCNC,S$GLB,, | Performed by: PHYSICAL MEDICINE & REHABILITATION

## 2023-11-03 PROCEDURE — 1159F MED LIST DOCD IN RCRD: CPT | Mod: HCNC,CPTII,S$GLB, | Performed by: PHYSICAL MEDICINE & REHABILITATION

## 2023-11-03 PROCEDURE — 1101F PT FALLS ASSESS-DOCD LE1/YR: CPT | Mod: HCNC,CPTII,S$GLB, | Performed by: PHYSICAL MEDICINE & REHABILITATION

## 2023-11-03 NOTE — PROGRESS NOTES
SUBJECTIVE:    Patient ID: Kelly Queen is a 78 y.o. female.    Chief Complaint: Back Pain    This is a 78-year-old woman who sees Dr. Mack for her primary care.  History of hypertension and pre diabetes otherwise denies any chronic major medical problems.  No cancer history.  Presents with approximately 3 week history of low back pain at the lumbosacral junction without radicular symptoms.  There was no injury.  She denies bowel or bladder dysfunction fever chills sweats or unexpected weight loss.  She is been taking a leave and Tylenol as needed for pain which does help some.  She saw primary care on 10/26/2023 and was given a prescription for Mobic which she says she can not tolerate.  She was also given a prescription for Zanaflex which she did not take.  I reviewed x-rays of lumbar spine done 10/30/2023 which show mild scoliosis and advanced multilevel degenerative disc disease.  Pain level is 8/10 in interferes with her quality of life in terms of activities of daily living recreation and social activities          Past Medical History:   Diagnosis Date    Arthritis     Carpal tunnel syndrome 2/25/15    left; last injected 2/25/15    Chronic kidney disease, stage III (moderate) 11/14/2014    Hypertension      Social History     Socioeconomic History    Marital status:    Tobacco Use    Smoking status: Never    Smokeless tobacco: Never   Substance and Sexual Activity    Alcohol use: No    Drug use: No     Past Surgical History:   Procedure Laterality Date    APPENDECTOMY      COLONOSCOPY  3/30/2010    Dr. Prince, ten year recheck    FEMUR FRACTURE SURGERY Left 5/21/15    IM rodding (L) distal femur    FRACTURE SURGERY      5- femer     HYSTERECTOMY      JOINT REPLACEMENT Left 6/7/13    (L) TKA    OOPHORECTOMY      TONSILLECTOMY       Family History   Problem Relation Age of Onset    Diabetes Sister     Cancer Sister         colon    Hypertension Sister     Diabetes Brother     Cancer  "Brother         lung    Basal cell carcinoma Daughter     Skin cancer Daughter         squnamus     Hypertension Mother     No Known Problems Son     Diabetes Brother     Heart disease Brother      Vitals:    11/03/23 0951   Weight: 75.6 kg (166 lb 10.7 oz)   Height: 5' 2" (1.575 m)       Review of Systems   Constitutional:  Negative for chills, diaphoresis, fatigue, fever and unexpected weight change.   HENT:  Negative for trouble swallowing.    Eyes:  Negative for visual disturbance.   Respiratory:  Negative for shortness of breath.    Cardiovascular:  Negative for chest pain.   Gastrointestinal:  Negative for abdominal pain, constipation, nausea and vomiting.   Genitourinary:  Negative for difficulty urinating.   Musculoskeletal:  Negative for arthralgias, back pain, gait problem, joint swelling, myalgias, neck pain and neck stiffness.   Neurological:  Negative for dizziness, speech difficulty, weakness, light-headedness, numbness and headaches.          Objective:      Physical Exam  Neurological:      Mental Status: She is alert and oriented to person, place, and time.      Comments: She is awake and in no acute distress  No point tenderness or palpable masses about the lumbar spine  Forward flexion of the lumbar spine is normal and painless.  Extension at 10° causes pain at the lumbosacral junction  Reflexes- +1-+2 reflexes at the following:   C5-Biceps   C6-Brachioradialis   C7-Triceps   L3/4-Patellar   S1-Achilles   Filomena sign negative bilaterally  Strength testing- 5/5 strength in the following muscle groups:  C5-Elbow flexion  C6-Wrist extension  C7-Elbow extension  C8-Finger flexion  T1-Finger abduction  L2-Hip flexion  L3-Knee extension  L4-Ankle dorsiflexion  L5-Great toe extension  S1-Ankle plantar flexion       Straight leg raise negative bilaterally  ROGER test negative bilaterally             Assessment:       1. Acute midline low back pain without sciatica           Plan:     She has a nonfocal " neurological examination and no historical red flags.  I suspect she has low back pain on basis of degenerative disc disease and facet arthropathy.  Has pain with facet loading.  I think she can be treated conservatively.  We are going to get her started in physical therapy at [facility] physical therapy in [place].  Continue Aleve plus or minus Tylenol as needed for pain.  Follow-up with me at the completion of therapy.  Consider MRI and subsequent radiofrequency ablation      Acute midline low back pain without sciatica  -     Ambulatory referral[facility]consult to [facility]  -     Ambulatory referral/consult to Physical/Occupational Therapy; Future; Expected date: 11/10/2023

## 2024-01-18 ENCOUNTER — OFFICE VISIT (OUTPATIENT)
Dept: FAMILY MEDICINE | Facility: CLINIC | Age: 79
End: 2024-01-18
Payer: MEDICARE

## 2024-01-18 VITALS
DIASTOLIC BLOOD PRESSURE: 60 MMHG | WEIGHT: 161.38 LBS | OXYGEN SATURATION: 98 % | HEIGHT: 62 IN | SYSTOLIC BLOOD PRESSURE: 142 MMHG | HEART RATE: 84 BPM | TEMPERATURE: 99 F | BODY MASS INDEX: 29.7 KG/M2

## 2024-01-18 DIAGNOSIS — J32.9 SINUSITIS, UNSPECIFIED CHRONICITY, UNSPECIFIED LOCATION: ICD-10-CM

## 2024-01-18 DIAGNOSIS — R05.9 COUGH, UNSPECIFIED TYPE: Primary | ICD-10-CM

## 2024-01-18 LAB — SARS-COV-2 RNA RESP QL NAA+PROBE: NOT DETECTED

## 2024-01-18 PROCEDURE — 3078F DIAST BP <80 MM HG: CPT | Mod: HCNC,CPTII,S$GLB, | Performed by: NURSE PRACTITIONER

## 2024-01-18 PROCEDURE — 1160F RVW MEDS BY RX/DR IN RCRD: CPT | Mod: HCNC,CPTII,S$GLB, | Performed by: NURSE PRACTITIONER

## 2024-01-18 PROCEDURE — 1159F MED LIST DOCD IN RCRD: CPT | Mod: HCNC,CPTII,S$GLB, | Performed by: NURSE PRACTITIONER

## 2024-01-18 PROCEDURE — 87635 SARS-COV-2 COVID-19 AMP PRB: CPT | Mod: HCNC | Performed by: NURSE PRACTITIONER

## 2024-01-18 PROCEDURE — 99213 OFFICE O/P EST LOW 20 MIN: CPT | Mod: HCNC,S$GLB,, | Performed by: NURSE PRACTITIONER

## 2024-01-18 PROCEDURE — 99999 PR PBB SHADOW E&M-EST. PATIENT-LVL IV: CPT | Mod: PBBFAC,HCNC,, | Performed by: NURSE PRACTITIONER

## 2024-01-18 PROCEDURE — 3077F SYST BP >= 140 MM HG: CPT | Mod: HCNC,CPTII,S$GLB, | Performed by: NURSE PRACTITIONER

## 2024-01-18 PROCEDURE — 1101F PT FALLS ASSESS-DOCD LE1/YR: CPT | Mod: HCNC,CPTII,S$GLB, | Performed by: NURSE PRACTITIONER

## 2024-01-18 PROCEDURE — 1126F AMNT PAIN NOTED NONE PRSNT: CPT | Mod: HCNC,CPTII,S$GLB, | Performed by: NURSE PRACTITIONER

## 2024-01-18 PROCEDURE — 3288F FALL RISK ASSESSMENT DOCD: CPT | Mod: HCNC,CPTII,S$GLB, | Performed by: NURSE PRACTITIONER

## 2024-01-18 RX ORDER — FLUTICASONE PROPIONATE 50 MCG
1 SPRAY, SUSPENSION (ML) NASAL DAILY
Qty: 16 G | Refills: 0 | Status: SHIPPED | OUTPATIENT
Start: 2024-01-18

## 2024-01-18 RX ORDER — BENZONATATE 100 MG/1
100 CAPSULE ORAL 3 TIMES DAILY PRN
Qty: 30 CAPSULE | Refills: 0 | Status: SHIPPED | OUTPATIENT
Start: 2024-01-18 | End: 2024-01-28

## 2024-01-18 RX ORDER — AMOXICILLIN AND CLAVULANATE POTASSIUM 875; 125 MG/1; MG/1
1 TABLET, FILM COATED ORAL EVERY 12 HOURS
Qty: 14 TABLET | Refills: 0 | Status: SHIPPED | OUTPATIENT
Start: 2024-01-18 | End: 2024-01-25

## 2024-01-18 NOTE — PROGRESS NOTES
Subjective:       Patient ID: Kelly Queen is a 78 y.o. female.    Chief Complaint: Sinus Problem, Cough, and Headache     HPI   77 y/o female patient with medical problems listed below presents for headache, sinus pressure and pain, rhinorrhea with yellowish mucus, itchy throat and cough for 2 days. Denies recent travel or sick contacts. States takes tussin for cough with relief. Denies chest pain, sob, wheezing, nausea, vomiting, abdominal pain, urinary or bowel symptoms, fever, chills, generalized body ache.     Labs reviewed from 6/2023    Patient Active Problem List   Diagnosis    Hypertension    Arthritis    Stage 3a chronic kidney disease    Prediabetes    Dyslipidemia    Primary osteoarthritis of both knees    BMI 30.0-30.9,adult      Review of patient's allergies indicates:   Allergen Reactions    Codeine      Other reaction(s): pt does not want to take    Mobic [meloxicam] Nausea And Vomiting     Past Surgical History:   Procedure Laterality Date    APPENDECTOMY      COLONOSCOPY  3/30/2010    Dr. Prince, ten year recheck    FEMUR FRACTURE SURGERY Left 5/21/15    IM rodding (L) distal femur    FRACTURE SURGERY      5- femer     HYSTERECTOMY      JOINT REPLACEMENT Left 6/7/13    (L) TKA    OOPHORECTOMY      TONSILLECTOMY          Current Outpatient Medications:     aspirin 81 mg Tab, Take 1 tablet by mouth once daily. , Disp: , Rfl:     b complex vitamins capsule, Take 1 capsule by mouth once daily., Disp: , Rfl:     biotin 1,000 mcg Chew, Take 1 tablet by mouth once daily., Disp: , Rfl:     clotrimazole-betamethasone 1-0.05% (LOTRISONE) cream, Apply topically 2 (two) times daily., Disp: 45 g, Rfl: 1    lisinopriL-hydrochlorothiazide (PRINZIDE,ZESTORETIC) 10-12.5 mg per tablet, TAKE 1 TABLET EVERY DAY, Disp: 90 tablet, Rfl: 3    multivitamin capsule, Take 1 capsule by mouth once daily., Disp: , Rfl:     rosuvastatin (CRESTOR) 20 MG tablet, TAKE 1 TABLET BY MOUTH EVERY EVENING., Disp: 90 tablet,  "Rfl: 3    TURMERIC ORAL, Take 1 tablet by mouth once daily., Disp: , Rfl:     amoxicillin-clavulanate 875-125mg (AUGMENTIN) 875-125 mg per tablet, Take 1 tablet by mouth every 12 (twelve) hours. for 7 days, Disp: 14 tablet, Rfl: 0    benzonatate (TESSALON) 100 MG capsule, Take 1 capsule (100 mg total) by mouth 3 (three) times daily as needed for Cough., Disp: 30 capsule, Rfl: 0    fluticasone propionate (FLONASE) 50 mcg/actuation nasal spray, 1 spray (50 mcg total) by Each Nostril route once daily., Disp: 16 g, Rfl: 0    Lab Results   Component Value Date    WBC 7.42 06/14/2023    HGB 12.8 06/14/2023    HCT 40.0 06/14/2023     06/14/2023    CHOL 135 06/14/2023    TRIG 80 06/14/2023    HDL 62 06/14/2023    ALT 14 06/14/2023    AST 19 06/14/2023     06/14/2023    K 4.0 06/14/2023     06/14/2023    CREATININE 0.9 06/14/2023    BUN 16 06/14/2023    CO2 26 06/14/2023    TSH 0.655 04/25/2019    GLUF 89 11/12/2018    HGBA1C 5.8 (H) 06/14/2023     Review of Systems   Constitutional:  Negative for chills and fever.   HENT:  Positive for postnasal drip, rhinorrhea, sinus pressure and sinus pain.    Respiratory:  Positive for cough. Negative for chest tightness and shortness of breath.    Cardiovascular:  Negative for chest pain and palpitations.   Gastrointestinal:  Negative for abdominal pain.   Neurological:  Positive for headaches. Negative for dizziness.       Objective:   BP (!) 142/60 (BP Location: Left arm, Patient Position: Sitting, BP Method: Medium (Manual))   Pulse 84   Temp 98.5 °F (36.9 °C) (Oral)   Ht 5' 2" (1.575 m)   Wt 73.2 kg (161 lb 6 oz)   SpO2 98%   BMI 29.52 kg/m²       Physical Exam  Constitutional:       General: She is not in acute distress.     Appearance: Normal appearance.   HENT:      Head: Atraumatic.      Nose:      Right Sinus: Maxillary sinus tenderness present.      Left Sinus: Maxillary sinus tenderness present.   Cardiovascular:      Rate and Rhythm: Normal rate and " regular rhythm.      Pulses: Normal pulses.      Heart sounds: Normal heart sounds.   Pulmonary:      Effort: Pulmonary effort is normal.      Breath sounds: Normal breath sounds.   Abdominal:      General: Abdomen is flat. Bowel sounds are normal.      Palpations: Abdomen is soft.      Tenderness: There is no abdominal tenderness.   Neurological:      Mental Status: She is oriented to person, place, and time.         Assessment:       1. Cough, unspecified type    2. Sinusitis, unspecified chronicity, unspecified location        Plan:       1. Cough, unspecified type  - COVID-19 Routine Screening  - benzonatate (TESSALON) 100 MG capsule; Take 1 capsule (100 mg total) by mouth 3 (three) times daily as needed for Cough.  Dispense: 30 capsule; Refill: 0    2. Sinusitis, unspecified chronicity, unspecified location  - amoxicillin-clavulanate 875-125mg (AUGMENTIN) 875-125 mg per tablet; Take 1 tablet by mouth every 12 (twelve) hours. for 7 days  Dispense: 14 tablet; Refill: 0  - fluticasone propionate (FLONASE) 50 mcg/actuation nasal spray; 1 spray (50 mcg total) by Each Nostril route once daily.  Dispense: 16 g; Refill: 0     Patient with be reevaluated in  2 weeks  or sooner cem Lee NP

## 2024-02-07 ENCOUNTER — OFFICE VISIT (OUTPATIENT)
Dept: SPINE | Facility: CLINIC | Age: 79
End: 2024-02-07
Payer: MEDICARE

## 2024-02-07 VITALS — WEIGHT: 161.38 LBS | BODY MASS INDEX: 29.7 KG/M2 | HEIGHT: 62 IN

## 2024-02-07 DIAGNOSIS — M54.9 DORSALGIA, UNSPECIFIED: ICD-10-CM

## 2024-02-07 DIAGNOSIS — M51.36 LUMBAR DEGENERATIVE DISC DISEASE: ICD-10-CM

## 2024-02-07 DIAGNOSIS — M54.50 ACUTE MIDLINE LOW BACK PAIN WITHOUT SCIATICA: Primary | ICD-10-CM

## 2024-02-07 PROCEDURE — 1101F PT FALLS ASSESS-DOCD LE1/YR: CPT | Mod: HCNC,CPTII,S$GLB, | Performed by: PHYSICAL MEDICINE & REHABILITATION

## 2024-02-07 PROCEDURE — 1159F MED LIST DOCD IN RCRD: CPT | Mod: HCNC,CPTII,S$GLB, | Performed by: PHYSICAL MEDICINE & REHABILITATION

## 2024-02-07 PROCEDURE — 1125F AMNT PAIN NOTED PAIN PRSNT: CPT | Mod: HCNC,CPTII,S$GLB, | Performed by: PHYSICAL MEDICINE & REHABILITATION

## 2024-02-07 PROCEDURE — 1160F RVW MEDS BY RX/DR IN RCRD: CPT | Mod: HCNC,CPTII,S$GLB, | Performed by: PHYSICAL MEDICINE & REHABILITATION

## 2024-02-07 PROCEDURE — 3288F FALL RISK ASSESSMENT DOCD: CPT | Mod: HCNC,CPTII,S$GLB, | Performed by: PHYSICAL MEDICINE & REHABILITATION

## 2024-02-07 PROCEDURE — 99213 OFFICE O/P EST LOW 20 MIN: CPT | Mod: HCNC,S$GLB,, | Performed by: PHYSICAL MEDICINE & REHABILITATION

## 2024-02-07 NOTE — PROGRESS NOTES
SUBJECTIVE:    Patient ID: Kelly Queen is a 78 y.o. female.    Chief Complaint: Follow-up    She presents today for follow-up of low back pain without radicular symptoms.  She completed her course of physical therapy at an outside facility.  She has noted a great deal of improvement from physical therapy but she has still not satisfied with her quality of life.  In particular she is complaining that when she sits down to so after about 10 minutes back starts to hurt.  That significantly interferes with her quality of life because that is her favorite past time.  Her current pain level is 1/10 but at times as high as 10/10 and interferes with her quality of life in terms of activities of daily living recreation and social activities.  Otherwise she has no new or progressive problems.  She also asked about a back brace          Past Medical History:   Diagnosis Date    Arthritis     Carpal tunnel syndrome 2/25/15    left; last injected 2/25/15    Chronic kidney disease, stage III (moderate) 11/14/2014    Hypertension      Social History     Socioeconomic History    Marital status:    Tobacco Use    Smoking status: Never    Smokeless tobacco: Never   Substance and Sexual Activity    Alcohol use: No    Drug use: No     Past Surgical History:   Procedure Laterality Date    APPENDECTOMY      COLONOSCOPY  3/30/2010    Dr. Prince, ten year recheck    FEMUR FRACTURE SURGERY Left 5/21/15    IM rodding (L) distal femur    FRACTURE SURGERY      5- femer     HYSTERECTOMY      JOINT REPLACEMENT Left 6/7/13    (L) TKA    OOPHORECTOMY      TONSILLECTOMY       Family History   Problem Relation Age of Onset    Diabetes Sister     Cancer Sister         colon    Hypertension Sister     Diabetes Brother     Cancer Brother         lung    Basal cell carcinoma Daughter     Skin cancer Daughter         squnamus     Hypertension Mother     No Known Problems Son     Diabetes Brother     Heart disease Brother      Vitals:  "   02/07/24 1339   Weight: 73.2 kg (161 lb 6 oz)   Height: 5' 2" (1.575 m)       Review of Systems   Constitutional:  Negative for chills, diaphoresis, fatigue, fever and unexpected weight change.   HENT:  Negative for trouble swallowing.    Eyes:  Negative for visual disturbance.   Respiratory:  Negative for shortness of breath.    Cardiovascular:  Negative for chest pain.   Gastrointestinal:  Negative for abdominal pain, constipation, nausea and vomiting.   Genitourinary:  Negative for difficulty urinating.   Musculoskeletal:  Negative for arthralgias, back pain, gait problem, joint swelling, myalgias, neck pain and neck stiffness.   Neurological:  Negative for dizziness, speech difficulty, weakness, light-headedness, numbness and headaches.          Objective:      Physical Exam  Neurological:      Mental Status: She is alert and oriented to person, place, and time.      Comments: She is awake and in no acute distress  No point tenderness or palpable masses about the lumbar spine  Forward flexion of the lumbar spine is to about 45° before she complains of pain at the lumbosacral junction.  Extension combined with rotation to the right and left causes pain at the lumbosacral junction             Assessment:       1. Acute midline low back pain without sciatica    2. Dorsalgia, unspecified    3. Lumbar degenerative disc disease           Plan:     Improved but still not satisfied with her quality of life following physical therapy.  At this point I recommend an MRI of the lumbar spine in preparation for medial branch blocks and subsequent radiofrequency ablation as indicated of the L4-5 and L5-S1 facet joints bilaterally.  Has pain with facet loading.  I will get her a back brace.  Follow-up with me after the scan.  Consider a spinal cord stimulator.  I gave her some information about that      Acute midline low back pain without sciatica    Dorsalgia, unspecified  -     MRI Lumbar Spine Without Contrast; Future; " Expected date: 02/07/2024    Lumbar degenerative disc disease  -     Back/Cervical Brace For Home Use

## 2024-02-16 ENCOUNTER — HOSPITAL ENCOUNTER (OUTPATIENT)
Dept: RADIOLOGY | Facility: HOSPITAL | Age: 79
Discharge: HOME | End: 2024-02-16
Attending: PHYSICAL MEDICINE & REHABILITATION
Payer: MEDICARE

## 2024-02-16 DIAGNOSIS — M54.9 DORSALGIA, UNSPECIFIED: ICD-10-CM

## 2024-02-16 PROCEDURE — 72148 MRI LUMBAR SPINE W/O DYE: CPT | Mod: TC,PO

## 2024-02-19 ENCOUNTER — TELEPHONE (OUTPATIENT)
Dept: SPINE | Facility: CLINIC | Age: 79
End: 2024-02-19

## 2024-02-19 ENCOUNTER — TELEPHONE (OUTPATIENT)
Dept: PAIN MEDICINE | Facility: CLINIC | Age: 79
End: 2024-02-19
Payer: MEDICARE

## 2024-02-19 ENCOUNTER — OFFICE VISIT (OUTPATIENT)
Dept: SPINE | Facility: CLINIC | Age: 79
End: 2024-02-19
Payer: MEDICARE

## 2024-02-19 ENCOUNTER — HOSPITAL ENCOUNTER (OUTPATIENT)
Dept: RADIOLOGY | Facility: HOSPITAL | Age: 79
Discharge: HOME | End: 2024-02-19
Attending: PHYSICAL MEDICINE & REHABILITATION
Payer: MEDICARE

## 2024-02-19 VITALS — HEIGHT: 62 IN | WEIGHT: 161.38 LBS | BODY MASS INDEX: 29.7 KG/M2

## 2024-02-19 DIAGNOSIS — N28.89 RENAL MASS: ICD-10-CM

## 2024-02-19 DIAGNOSIS — M47.816 LUMBAR SPONDYLOSIS: Primary | ICD-10-CM

## 2024-02-19 DIAGNOSIS — S22.080A COMPRESSION FRACTURE OF T12 VERTEBRA, INITIAL ENCOUNTER: Primary | ICD-10-CM

## 2024-02-19 DIAGNOSIS — M54.50 ACUTE MIDLINE LOW BACK PAIN WITHOUT SCIATICA: ICD-10-CM

## 2024-02-19 PROCEDURE — 1160F RVW MEDS BY RX/DR IN RCRD: CPT | Mod: CPTII,S$GLB,, | Performed by: PHYSICAL MEDICINE & REHABILITATION

## 2024-02-19 PROCEDURE — 99999 PR PBB SHADOW E&M-EST. PATIENT-LVL III: CPT | Mod: PBBFAC,HCNC,, | Performed by: PHYSICAL MEDICINE & REHABILITATION

## 2024-02-19 PROCEDURE — 1101F PT FALLS ASSESS-DOCD LE1/YR: CPT | Mod: CPTII,S$GLB,, | Performed by: PHYSICAL MEDICINE & REHABILITATION

## 2024-02-19 PROCEDURE — 1125F AMNT PAIN NOTED PAIN PRSNT: CPT | Mod: CPTII,S$GLB,, | Performed by: PHYSICAL MEDICINE & REHABILITATION

## 2024-02-19 PROCEDURE — 76770 US EXAM ABDO BACK WALL COMP: CPT | Mod: 26,,, | Performed by: RADIOLOGY

## 2024-02-19 PROCEDURE — 3288F FALL RISK ASSESSMENT DOCD: CPT | Mod: CPTII,S$GLB,, | Performed by: PHYSICAL MEDICINE & REHABILITATION

## 2024-02-19 PROCEDURE — 76770 US EXAM ABDO BACK WALL COMP: CPT | Mod: TC

## 2024-02-19 PROCEDURE — 99213 OFFICE O/P EST LOW 20 MIN: CPT | Mod: S$GLB,,, | Performed by: PHYSICAL MEDICINE & REHABILITATION

## 2024-02-19 PROCEDURE — 1159F MED LIST DOCD IN RCRD: CPT | Mod: CPTII,S$GLB,, | Performed by: PHYSICAL MEDICINE & REHABILITATION

## 2024-02-19 NOTE — PROGRESS NOTES
SUBJECTIVE:    Patient ID: Kelly Queen is a 78 y.o. female.    Chief Complaint: Follow-up    She is here to review her lumbar MRI done 02/16/2024 to evaluate her complaint of low back pain at the lumbosacral junction without radicular symptoms.  The MRI is summarized below:    FINDINGS:  Mild to moderate convex left lumbar spine curvature has apex near L1-L2 disc.     Superior T12 endplate compression fracture demonstrates 30% loss of vertebral body height with low signal paralleling the superior endplate along with increased T2 bone marrow signal suggesting fracture and edema.     No additional fracture. Elsewhere, degenerative discogenic bone marrow endplate signal alteration is present and lumbar spine otherwise maintains normal bone marrow signal.     At T11-T12, disc osteophyte complex results in mild central canal narrowing, seen only on sagittal sequence.     At T12-L1, minor annular bulge without narrowing.     At L1-L2, severe loss of disc space height and circumferential disc bulge results in mild central canal narrowing without neural foramen narrowing. 2 mm retrolisthesis of L1 on L2 occur.     At L2-L3, moderate loss of disc space height and circumferential disc bulge combined with mild facet joint osteoarthrosis to result in mild central canal narrowing. 3 mm retrolisthesis of L2 on L3 are present. Mild bilateral neural foramen narrowing are present. Left extraforaminal component of the disc bulge results in slight posterior displacement and contact of left extraforaminal L2 spinal nerve.     At L3-L4, mild loss of disc space height and circumferential disc bulge combines with moderate left and minor right facet joint osteoarthrosis to result in mild central canal narrowing and moderate left and mild right neural foramen narrowing.     At L4-L5, severe bilateral facet joint osteoarthrosis allows 3 mm anterolisthesis of L4 and L5 and combines with circumferential disc bulge to result in mild  central canal narrowing and moderate bilateral neural foramen narrowing. Slight compression of right L4 dorsal root ganglion occurs in the neural foramen.     At L5-S1, moderate bilateral facet joint osteoarthrosis allows 1 mm anterolisthesis of L5 on S1. Circumferential disc bulge causes no central canal or neural foramen narrowing.     Mild fatty atrophy affects bilateral erector spinae muscles. Bilateral renal cysts are present. Exophytic left renal lesion containing dependent hypointensity measuring 21 mm is of indeterminate etiology.     IMPRESSION:     1. Acute T12 superior endplate compression fracture.  2. Multilevel lumbar disc degeneration and facet joint osteoarthrosis.  3. Contact and posterior displacement of left extra foraminal L2 spinal nerve by left extraforaminal component of L2-L3 disc bulge. Correlation for left L2 radiculopathy is requested.  4. Moderate left L3-L4 neural foramen narrowing. Correlation for left L3 radiculopathy is requested.  5. Grade 1 anterolisthesis of L4 and L5 related to severe bilateral facet joint osteoarthrosis resulting in moderate bilateral neural foramen narrowing with slight compression of right L4 dorsal root ganglion. Correlation for right L4 radiculopathy is requested.  6. 21 mm indeterminate left renal lesion. Further evaluation with renal ultrasound is recommended to document complex cystic etiology and exclude cystic neoplasm.        Clinically she is about the same.  Her symptoms are described above.  Complaining of low back pain at the lumbosacral junction without radicular symptoms.  Minimal pain at the thoracolumbar junction.  No history of recent falls.  Current pain level is 4/10 but at times as high as 8/10 and interferes with her quality of life in terms of activities of daily living recreation and social activities          Past Medical History:   Diagnosis Date    Arthritis     Carpal tunnel syndrome 2/25/15    left; last injected 2/25/15    Chronic  "kidney disease, stage III (moderate) 11/14/2014    Hypertension      Social History     Socioeconomic History    Marital status:    Tobacco Use    Smoking status: Never    Smokeless tobacco: Never   Substance and Sexual Activity    Alcohol use: No    Drug use: No     Past Surgical History:   Procedure Laterality Date    APPENDECTOMY      COLONOSCOPY  3/30/2010    Dr. Prince, ten year recheck    FEMUR FRACTURE SURGERY Left 5/21/15    IM rodding (L) distal femur    FRACTURE SURGERY      5- femer     HYSTERECTOMY      JOINT REPLACEMENT Left 6/7/13    (L) TKA    OOPHORECTOMY      TONSILLECTOMY       Family History   Problem Relation Age of Onset    Diabetes Sister     Cancer Sister         colon    Hypertension Sister     Diabetes Brother     Cancer Brother         lung    Basal cell carcinoma Daughter     Skin cancer Daughter         squnamus     Hypertension Mother     No Known Problems Son     Diabetes Brother     Heart disease Brother      Vitals:    02/19/24 1129   Weight: 73.2 kg (161 lb 6 oz)   Height: 5' 2" (1.575 m)       Review of Systems   Constitutional:  Negative for chills, diaphoresis, fatigue, fever and unexpected weight change.   HENT:  Negative for trouble swallowing.    Eyes:  Negative for visual disturbance.   Respiratory:  Negative for shortness of breath.    Cardiovascular:  Negative for chest pain.   Gastrointestinal:  Negative for abdominal pain, constipation, nausea and vomiting.   Genitourinary:  Negative for difficulty urinating.   Musculoskeletal:  Negative for arthralgias, back pain, gait problem, joint swelling, myalgias, neck pain and neck stiffness.   Neurological:  Negative for dizziness, speech difficulty, weakness, light-headedness, numbness and headaches.          Objective:      Physical Exam  Neurological:      Mental Status: She is alert and oriented to person, place, and time.      Comments: She is awake and in no acute distress  Mild pain on palpation and percussion " over the thoracolumbar junction.  Mild tenderness palpation lumbar paraspinous musculature at the lumbosacral junction             Assessment:       1. Compression fracture of T12 vertebra, initial encounter    2. Acute midline low back pain without sciatica    3. Renal mass           Plan:     I reassured her she has no worrisome findings on her MRI.  For symptom relief as previously discussed we will move on to medial branch blocks and subsequent radiofrequency ablation as indicated of the L4-5 and L5-S1 facet joints bilaterally.  She does not seem to be significantly symptomatic from the T12 compression fracture.  We did talk about kyphoplasty and I gave her some information about it but for now she wishes to defer.  We will get a renal ultrasound.  Follow-up with me after the procedure      Compression fracture of T12 vertebra, initial encounter    Acute midline low back pain without sciatica    Renal mass  -     US Retroperitoneal Complete; Future; Expected date: 02/19/2024

## 2024-02-19 NOTE — TELEPHONE ENCOUNTER
----- Message from Jas Cho MD sent at 2/19/2024 11:50 AM CST -----  Please schedule for medial branch blocks and subsequent radiofrequency ablation as indicated of the L4-5 and L5-S1 facet joints bilaterally

## 2024-02-19 NOTE — TELEPHONE ENCOUNTER
Spoke to pt and let her know per Dr. Cho Her ultrasound shows simple kidney cysts.  No worrisome findings.  No further workup is necessary. She verbalized understanding

## 2024-02-23 ENCOUNTER — TELEPHONE (OUTPATIENT)
Dept: PAIN MEDICINE | Facility: CLINIC | Age: 79
End: 2024-02-23
Payer: MEDICARE

## 2024-02-23 NOTE — TELEPHONE ENCOUNTER
----- Message from Marimar Smiley sent at 2/23/2024 11:28 AM CST -----  Regarding: brace  Contact: pt  Type:  Needs Medical Advice    Who Called: pt  Would the patient rather a call back or a response via [facility]? Call back  Best Call Back Number: [phone]    Additional Information: sts she would like to speak to someone about a brace she was due to get

## 2024-03-04 NOTE — DISCHARGE INSTRUCTIONS
Before leaving, please make sure you have all your personal belongings such as glasses, purses, wallets, keys, cell phones, jewelry, jackets etc     Nerve Block  This was a test, not a treatment. Your pain may return.  Keep your pain journal - The Dr needs to see if you have some pain relief - even if the pain returns. Dr office will call to check your pain levels within 3 days or message you on the [facility] portal.  Most patients have to have 2 tests before the Radiofrequency procedure.   Perform activities that normally cause you pain during this testing period    Home care instructions  You may apply  a clean ice pack to the injection sites over the next 2 days at 20 minute intervals, apply ice pack for 20 minutes, then remove for 20 minutes.   NO HEAT for the next  2 days to injection sites including NO heating pads, hot packs, saunas steam rooms.  You may take a shower but no soaking in any water for the next 2 days including no tub baths, hot tubs or swimming pools.  Resume Aspirin, Plavix, or Coumadin the day after the procedure unless otherwise instructed.  Do not drive for 24 hr      SEEK  IMMEDIATE MEDICAL HELP FOR:  Severe increase in your usual pain or appearance of new pain  Prolonged  ( more than 8 hours)or increasing weakness or numbness in the legs or arms  Drainage, redness, active bleeding, or increased swelling at the injection site  Temperature over 100.0 degrees F.  Headache that increases when your head is upright and decreases when you lie flat  Shortness of breath, chest pain, or problems breathing      After Surgery:  Always be aware that any surgery can cause these symptoms:    Pain- After this  test, we expect your pain to decrease but  then it may return as the local anesthetic wears off. Try to NOT take your pain medicine during this testing period. Ice and rest can help with pain relief.    Bleeding- a little bleeding after a surgery is usually within normal.  If there is a lot of blood  you need to notify your MD.  Emergency treatments of bleeding are cold application, elevation of the bleeding site and compression.    Infection- Infection after surgery is NOT a normal occurrence.  Signs of infection are fever, swelling, hot to touch the incision.  If this occurs notify your MD immediately.    Nausea- this can be common after a surgery especially if you have had anesthesia medicine or are taking pain medicine.  Staying on clear liquids, bland foods, gingerale, or over the counter anti nausea medicines can help.  If you vomit more than once, notify your MD.  Anti Nausea medicines can be prescribed.

## 2024-03-05 ENCOUNTER — HOSPITAL ENCOUNTER (OUTPATIENT)
Facility: HOSPITAL | Age: 79
Discharge: HOME | End: 2024-03-05
Attending: ANESTHESIOLOGY | Admitting: ANESTHESIOLOGY
Payer: MEDICARE

## 2024-03-05 DIAGNOSIS — M47.896 OTHER SPONDYLOSIS, LUMBAR REGION: ICD-10-CM

## 2024-03-05 LAB — POCT GLUCOSE: 98 MG/DL (ref 70–110)

## 2024-03-05 PROCEDURE — 64493 INJ PARAVERT F JNT L/S 1 LEV: CPT | Mod: 50,,, | Performed by: ANESTHESIOLOGY

## 2024-03-05 PROCEDURE — 64494 INJ PARAVERT F JNT L/S 2 LEV: CPT | Mod: 50,,, | Performed by: ANESTHESIOLOGY

## 2024-03-05 PROCEDURE — 64493 INJ PARAVERT F JNT L/S 1 LEV: CPT | Mod: 50 | Performed by: ANESTHESIOLOGY

## 2024-03-05 PROCEDURE — 63600175 PHARM REV CODE 636 W HCPCS: Performed by: ANESTHESIOLOGY

## 2024-03-05 PROCEDURE — 64494 INJ PARAVERT F JNT L/S 2 LEV: CPT | Mod: 50 | Performed by: ANESTHESIOLOGY

## 2024-03-05 PROCEDURE — 25000003 PHARM REV CODE 250: Performed by: ANESTHESIOLOGY

## 2024-03-05 RX ORDER — SODIUM CHLORIDE, SODIUM LACTATE, POTASSIUM CHLORIDE, CALCIUM CHLORIDE 600; 310; 30; 20 MG/100ML; MG/100ML; MG/100ML; MG/100ML
INJECTION, SOLUTION INTRAVENOUS CONTINUOUS
Status: ACTIVE | OUTPATIENT
Start: 2024-03-05

## 2024-03-05 RX ORDER — BUPIVACAINE HYDROCHLORIDE 5 MG/ML
INJECTION, SOLUTION EPIDURAL; INTRACAUDAL
Status: DISCONTINUED | OUTPATIENT
Start: 2024-03-05 | End: 2024-03-05 | Stop reason: HOSPADM

## 2024-03-05 RX ORDER — MIDAZOLAM HYDROCHLORIDE 1 MG/ML
INJECTION, SOLUTION INTRAMUSCULAR; INTRAVENOUS
Status: DISCONTINUED | OUTPATIENT
Start: 2024-03-05 | End: 2024-03-05 | Stop reason: HOSPADM

## 2024-03-05 RX ORDER — LIDOCAINE HYDROCHLORIDE 10 MG/ML
1 INJECTION, SOLUTION EPIDURAL; INFILTRATION; INTRACAUDAL; PERINEURAL ONCE
Status: COMPLETED | OUTPATIENT
Start: 2024-03-05 | End: 2024-03-05

## 2024-03-05 RX ADMIN — SODIUM CHLORIDE, POTASSIUM CHLORIDE, SODIUM LACTATE AND CALCIUM CHLORIDE: 600; 310; 30; 20 INJECTION, SOLUTION INTRAVENOUS at 01:03

## 2024-03-05 RX ADMIN — LIDOCAINE HYDROCHLORIDE 0.2 ML: 10 INJECTION, SOLUTION EPIDURAL; INFILTRATION; INTRACAUDAL; PERINEURAL at 01:03

## 2024-03-05 NOTE — INTERVAL H&P NOTE
The patient has been examined and the H&P has been reviewed:    I concur with the findings and no changes have occurred since H&P was written.    Surgery risks, benefits and alternative options discussed and understood by patient/family.    This patient has been cleared for surgery in an ambulatory surgical facility    ASA 3,  Mallampatti Score 3  No history of anesthetic complications  Plan for RN IV sedation      There are no hospital problems to display for this patient.

## 2024-03-05 NOTE — PLAN OF CARE
Patient is awake and alert and says she is ready to go home; her daughter in law says she is ready to take p-patient home and she is driving patient home.Patient's vital signs and injection sites are stable. Patient denies pain, nausea, weakness or dizziness. All patient belongings have been returned to patient. Patient is in stable condition and in no distress. Patient is being discharged via wheelchair to car her daughter in law is driving.

## 2024-03-05 NOTE — OP NOTE
PROCEDURE DATE: 3/5/2024    PROCEDURE:  Diagnostic medial branch nerve blocks to the bilateral L4/5 and L5/S1 facets under fluoroscopy     DIAGNOSIS:  Other spondylosis, lumbar region    Post Op diagnosis: Same    PHYSICIAN: Rory Mosocso MD    MEDICATIONS INJECTED: 0.5% bupivicaine, 0.5 ml at each level    SEDATION MEDICATIONS:RN IV Versed     LOCAL ANESTHETIC USED: None    ESTIMATED BLOOD LOSS:  None    COMPLICATIONS:  None    TECHNIQUE: A time out was taken to identify the patient, procedure and side of the procedure. The patient was placed in a prone position, then prepped and draped in the usual sterile fashion using ChloraPrep and sterile towels.  The levels were determined under fluoroscopic guidance and then marked.  A 25-gauge 3.5 inch needle was introduced to the anatomic location of the medial branch nerves that innervate the bilateral L4/5 and L5/S1 facets. The above medication was then injected. The patient tolerated the procedure well.     The patient was monitored after the procedure. Patient was given pain diary to record pain levels at home.     If found to have greater than a 50% recovery and so will be scheduled for a radiofrequency ablation of the corresponding nerves.  Patient was given post procedure and discharge instructions to follow at home.  The patient was discharged in a stable condition.

## 2024-03-05 NOTE — DISCHARGE SUMMARY
[facility] - Periop Services  Discharge Note  Short Stay    Procedure(s) (LRB):  Block-nerve-medial branch-lumbar (Bilateral)      OUTCOME: Patient tolerated treatment/procedure well without complication and is now ready for discharge.    DISPOSITION: Home or Self Care    FINAL DIAGNOSIS:  <principal problem not specified>    FOLLOWUP: In clinic    DISCHARGE INSTRUCTIONS:    Discharge Procedure Orders   Notify your health care provider if you experience any of the following:  temperature >100.4     Notify your health care provider if you experience any of the following:  severe uncontrolled pain     Notify your health care provider if you experience any of the following:  redness, tenderness, or signs of infection (pain, swelling, redness, odor or green/yellow discharge around incision site)     Activity as tolerated        TIME SPENT ON DISCHARGE: 30 minutes

## 2024-03-06 ENCOUNTER — TELEPHONE (OUTPATIENT)
Dept: PAIN MEDICINE | Facility: CLINIC | Age: 79
End: 2024-03-06
Payer: MEDICARE

## 2024-03-06 VITALS
TEMPERATURE: 98 F | HEIGHT: 62 IN | OXYGEN SATURATION: 100 % | WEIGHT: 161 LBS | DIASTOLIC BLOOD PRESSURE: 64 MMHG | SYSTOLIC BLOOD PRESSURE: 144 MMHG | HEART RATE: 64 BPM | RESPIRATION RATE: 18 BRPM | BODY MASS INDEX: 29.63 KG/M2

## 2024-03-06 DIAGNOSIS — M47.816 LUMBAR SPONDYLOSIS: Primary | ICD-10-CM

## 2024-03-21 ENCOUNTER — HOSPITAL ENCOUNTER (OUTPATIENT)
Facility: HOSPITAL | Age: 79
Discharge: HOME | End: 2024-03-21
Attending: ANESTHESIOLOGY | Admitting: ANESTHESIOLOGY
Payer: MEDICARE

## 2024-03-21 DIAGNOSIS — M47.896 OTHER SPONDYLOSIS, LUMBAR REGION: ICD-10-CM

## 2024-03-21 PROCEDURE — 64494 INJ PARAVERT F JNT L/S 2 LEV: CPT | Mod: 50,,, | Performed by: ANESTHESIOLOGY

## 2024-03-21 PROCEDURE — 64493 INJ PARAVERT F JNT L/S 1 LEV: CPT | Mod: 50,,, | Performed by: ANESTHESIOLOGY

## 2024-03-21 PROCEDURE — 64494 INJ PARAVERT F JNT L/S 2 LEV: CPT | Mod: 50 | Performed by: ANESTHESIOLOGY

## 2024-03-21 PROCEDURE — 63600175 PHARM REV CODE 636 W HCPCS: Performed by: ANESTHESIOLOGY

## 2024-03-21 PROCEDURE — 25000003 PHARM REV CODE 250: Performed by: ANESTHESIOLOGY

## 2024-03-21 PROCEDURE — 64493 INJ PARAVERT F JNT L/S 1 LEV: CPT | Mod: 50 | Performed by: ANESTHESIOLOGY

## 2024-03-21 RX ORDER — SODIUM CHLORIDE, SODIUM LACTATE, POTASSIUM CHLORIDE, CALCIUM CHLORIDE 600; 310; 30; 20 MG/100ML; MG/100ML; MG/100ML; MG/100ML
INJECTION, SOLUTION INTRAVENOUS CONTINUOUS
Status: ACTIVE | OUTPATIENT
Start: 2024-03-21

## 2024-03-21 RX ORDER — BUPIVACAINE HYDROCHLORIDE 5 MG/ML
INJECTION, SOLUTION EPIDURAL; INTRACAUDAL
Status: DISCONTINUED | OUTPATIENT
Start: 2024-03-21 | End: 2024-04-05 | Stop reason: HOSPADM

## 2024-03-21 RX ORDER — LIDOCAINE HYDROCHLORIDE 10 MG/ML
1 INJECTION, SOLUTION EPIDURAL; INFILTRATION; INTRACAUDAL; PERINEURAL ONCE
Status: COMPLETED | OUTPATIENT
Start: 2024-03-21 | End: 2024-03-21

## 2024-03-21 RX ORDER — MIDAZOLAM HYDROCHLORIDE 1 MG/ML
INJECTION INTRAMUSCULAR; INTRAVENOUS
Status: DISCONTINUED | OUTPATIENT
Start: 2024-03-21 | End: 2024-04-05 | Stop reason: HOSPADM

## 2024-03-21 RX ADMIN — SODIUM CHLORIDE, POTASSIUM CHLORIDE, SODIUM LACTATE AND CALCIUM CHLORIDE 500 ML: 600; 310; 30; 20 INJECTION, SOLUTION INTRAVENOUS at 12:03

## 2024-03-21 RX ADMIN — LIDOCAINE HYDROCHLORIDE 0.2 MG: 10 INJECTION, SOLUTION EPIDURAL; INFILTRATION; INTRACAUDAL; PERINEURAL at 12:03

## 2024-03-21 NOTE — H&P
CC: low back pain    HPI: The patient is a 78 y.o. female with a history of low back pain here for mBB. There are no major changes in history and physical from 2/19/24 by Marquis.    Past Medical History:   Diagnosis Date    Arthritis     Carpal tunnel syndrome 2/25/15    left; last injected 2/25/15    Chronic kidney disease, stage III (moderate) 11/14/2014    Hypertension        Past Surgical History:   Procedure Laterality Date    APPENDECTOMY      COLONOSCOPY  3/30/2010    Dr. Prince, ten year recheck    FEMUR FRACTURE SURGERY Left 5/21/15    IM rodding (L) distal femur    FRACTURE SURGERY      5- femer     HYSTERECTOMY      INJECTION OF ANESTHETIC AGENT AROUND MEDIAL BRANCH NERVES INNERVATING LUMBAR FACET JOINT Bilateral 3/5/2024    Procedure: Block-nerve-medial branch-lumbar;  Surgeon: Rory Moscoso MD;  Location: [facility];  Service: Anesthesiology;  Laterality: Bilateral;  L4/5 and l5/s1 MBB    JOINT REPLACEMENT Left 6/7/13    (L) TKA    OOPHORECTOMY      TONSILLECTOMY         Family History   Problem Relation Age of Onset    Diabetes Sister     Cancer Sister         colon    Hypertension Sister     Diabetes Brother     Cancer Brother         lung    Basal cell carcinoma Daughter     Skin cancer Daughter         squnamus     Hypertension Mother     No Known Problems Son     Diabetes Brother     Heart disease Brother        Social History     Socioeconomic History    Marital status:    Tobacco Use    Smoking status: Never    Smokeless tobacco: Never   Substance and Sexual Activity    Alcohol use: No    Drug use: No       Current Facility-Administered Medications   Medication Dose Route Frequency Provider Last Rate Last Admin    lactated ringers infusion   Intravenous Continuous Rory Moscoso MD        LIDOcaine (PF) 10 mg/ml (1%) injection 10 mg  1 mL Intradermal Once Rory Moscoso MD         Facility-Administered Medications Ordered in Other Encounters   Medication Dose Route Frequency Provider  "Last Rate Last Admin    lactated ringers infusion   Intravenous Continuous VuRory MD   Stopped at 03/05/24 1400       Review of patient's allergies indicates:   Allergen Reactions    Codeine      Other reaction(s): pt does not want to take    Mobic [meloxicam] Nausea And Vomiting       Vitals:    03/18/24 0843   Weight: 73 kg (160 lb 15 oz)   Height: 5' 2" (1.575 m)       REVIEW OF SYSTEMS:     GENERAL: No weight loss, malaise or fevers.  HEENT:  No recent changes in vision or hearing  NECK: Negative for lumps, no difficulty with swallowing.  RESPIRATORY: Negative for cough, wheezing or shortness of breath, patient denies any recent URI.  CARDIOVASCULAR: Negative for chest pain, leg swelling or palpitations.  GI: Negative for abdominal discomfort, blood in stools or black stools or change in bowel habits.  MUSCULOSKELETAL: See HPI.  SKIN: Negative for lesions, rash, and itching.  PSYCH: No suicidal or homicidal ideations, no current mood disturbances.  HEMATOLOGY/LYMPHOLOGY: Negative for prolonged bleeding, bruising easily or swollen nodes. Patient is not currently taking any anti-coagulants  ENDO: No history of diabetes or thyroid dysfunction  NEURO: No history of syncope, paralysis, seizures or tremors.All other reviewed and negative other than HPI.    Physical exam:  Gen: A and O x3, pleasant, well-groomed  Skin: No rashes or obvious lesions  HEENT: PERRLA, no obvious deformities on ears or in canals. No thyroid masses, trachea midline, no palpable lymph nodes in neck, axilla.  CVS: Regular rate and rhythm, normal S1 and S2, no murmurs.  Resp: Clear to auscultation bilaterally.  Abdomen: Soft, NT/ND, normal bowel sounds present.  Musculoskeletal/Neuro: Moving all extremities    Assessment:  Other spondylosis, lumbar region          PLAN: MBB      This patient has been cleared for surgery in an ambulatory surgical facility    ASA 3,  Mallampatti Score 3  No history of anesthetic complications  Plan for RN IV " sedation

## 2024-03-21 NOTE — OP NOTE
PROCEDURE DATE: 3/21/2024    PROCEDURE:  Diagnostic medial branch nerve blocks to the bilateral L4/5 and L5/S1 facets under fluoroscopy     DIAGNOSIS:  Other spondylosis, lumbar region    Post Op diagnosis: Same    PHYSICIAN: Rory Moscoso MD    MEDICATIONS INJECTED: 0.5% bupivicaine, 0.5 ml at each level    SEDATION MEDICATIONS:RN IV Versed     LOCAL ANESTHETIC USED: None    ESTIMATED BLOOD LOSS:  None    COMPLICATIONS:  None    TECHNIQUE: A time out was taken to identify the patient, procedure and side of the procedure. The patient was placed in a prone position, then prepped and draped in the usual sterile fashion using ChloraPrep and sterile towels.  The levels were determined under fluoroscopic guidance and then marked.  A 25-gauge 3.5 inch needle was introduced to the anatomic location of the medial branch nerves that innervate the bilateral L4/5 and L5/S1 facets. The above medication was then injected. The patient tolerated the procedure well.     The patient was monitored after the procedure. Patient was given pain diary to record pain levels at home.     If found to have greater than a 50% recovery and so will be scheduled for a radiofrequency ablation of the corresponding nerves.  Patient was given post procedure and discharge instructions to follow at home.  The patient was discharged in a stable condition.

## 2024-03-21 NOTE — PLAN OF CARE
Patient received discharge instruction verbalized understanding. Reports tolerated procedure well. Drink offered; refused at this time. Daughter in law called for transportation. Patient assisted to vehicle via wheelchair by nursing staff without incident. Discharge instructions and pain diary in hand.

## 2024-03-21 NOTE — H&P (VIEW-ONLY)
CC: low back pain    HPI: The patient is a 78 y.o. female with a history of low back pain here for mBB. There are no major changes in history and physical from 2/19/24 by Marquis.    Past Medical History:   Diagnosis Date    Arthritis     Carpal tunnel syndrome 2/25/15    left; last injected 2/25/15    Chronic kidney disease, stage III (moderate) 11/14/2014    Hypertension        Past Surgical History:   Procedure Laterality Date    APPENDECTOMY      COLONOSCOPY  3/30/2010    Dr. Prince, ten year recheck    FEMUR FRACTURE SURGERY Left 5/21/15    IM rodding (L) distal femur    FRACTURE SURGERY      5- femer     HYSTERECTOMY      INJECTION OF ANESTHETIC AGENT AROUND MEDIAL BRANCH NERVES INNERVATING LUMBAR FACET JOINT Bilateral 3/5/2024    Procedure: Block-nerve-medial branch-lumbar;  Surgeon: Rory Moscoso MD;  Location: [facility];  Service: Anesthesiology;  Laterality: Bilateral;  L4/5 and l5/s1 MBB    JOINT REPLACEMENT Left 6/7/13    (L) TKA    OOPHORECTOMY      TONSILLECTOMY         Family History   Problem Relation Age of Onset    Diabetes Sister     Cancer Sister         colon    Hypertension Sister     Diabetes Brother     Cancer Brother         lung    Basal cell carcinoma Daughter     Skin cancer Daughter         squnamus     Hypertension Mother     No Known Problems Son     Diabetes Brother     Heart disease Brother        Social History     Socioeconomic History    Marital status:    Tobacco Use    Smoking status: Never    Smokeless tobacco: Never   Substance and Sexual Activity    Alcohol use: No    Drug use: No       Current Facility-Administered Medications   Medication Dose Route Frequency Provider Last Rate Last Admin    lactated ringers infusion   Intravenous Continuous Rory Moscoso MD        LIDOcaine (PF) 10 mg/ml (1%) injection 10 mg  1 mL Intradermal Once Rory Moscoso MD         Facility-Administered Medications Ordered in Other Encounters   Medication Dose Route Frequency Provider  "Last Rate Last Admin    lactated ringers infusion   Intravenous Continuous VuRory MD   Stopped at 03/05/24 1400       Review of patient's allergies indicates:   Allergen Reactions    Codeine      Other reaction(s): pt does not want to take    Mobic [meloxicam] Nausea And Vomiting       Vitals:    03/18/24 0843   Weight: 73 kg (160 lb 15 oz)   Height: 5' 2" (1.575 m)       REVIEW OF SYSTEMS:     GENERAL: No weight loss, malaise or fevers.  HEENT:  No recent changes in vision or hearing  NECK: Negative for lumps, no difficulty with swallowing.  RESPIRATORY: Negative for cough, wheezing or shortness of breath, patient denies any recent URI.  CARDIOVASCULAR: Negative for chest pain, leg swelling or palpitations.  GI: Negative for abdominal discomfort, blood in stools or black stools or change in bowel habits.  MUSCULOSKELETAL: See HPI.  SKIN: Negative for lesions, rash, and itching.  PSYCH: No suicidal or homicidal ideations, no current mood disturbances.  HEMATOLOGY/LYMPHOLOGY: Negative for prolonged bleeding, bruising easily or swollen nodes. Patient is not currently taking any anti-coagulants  ENDO: No history of diabetes or thyroid dysfunction  NEURO: No history of syncope, paralysis, seizures or tremors.All other reviewed and negative other than HPI.    Physical exam:  Gen: A and O x3, pleasant, well-groomed  Skin: No rashes or obvious lesions  HEENT: PERRLA, no obvious deformities on ears or in canals. No thyroid masses, trachea midline, no palpable lymph nodes in neck, axilla.  CVS: Regular rate and rhythm, normal S1 and S2, no murmurs.  Resp: Clear to auscultation bilaterally.  Abdomen: Soft, NT/ND, normal bowel sounds present.  Musculoskeletal/Neuro: Moving all extremities    Assessment:  Other spondylosis, lumbar region          PLAN: MBB      This patient has been cleared for surgery in an ambulatory surgical facility    ASA 3,  Mallampatti Score 3  No history of anesthetic complications  Plan for RN IV " sedation

## 2024-03-21 NOTE — DISCHARGE SUMMARY
[facility]  Discharge Note  Short Stay    Procedure(s) (LRB):  Block-nerve-medial branch-lumbar (Bilateral)      OUTCOME: Patient tolerated treatment/procedure well without complication and is now ready for discharge.    DISPOSITION: Home or Self Care    FINAL DIAGNOSIS:  <principal problem not specified>    FOLLOWUP: In clinic    DISCHARGE INSTRUCTIONS:    Discharge Procedure Orders   Notify your health care provider if you experience any of the following:  temperature >100.4     Notify your health care provider if you experience any of the following:  severe uncontrolled pain     Notify your health care provider if you experience any of the following:  redness, tenderness, or signs of infection (pain, swelling, redness, odor or green/yellow discharge around incision site)     Activity as tolerated        TIME SPENT ON DISCHARGE: 30 minutes

## 2024-03-22 ENCOUNTER — TELEPHONE (OUTPATIENT)
Dept: PAIN MEDICINE | Facility: CLINIC | Age: 79
End: 2024-03-22
Payer: MEDICARE

## 2024-03-22 VITALS
HEART RATE: 69 BPM | OXYGEN SATURATION: 96 % | DIASTOLIC BLOOD PRESSURE: 66 MMHG | HEIGHT: 62 IN | SYSTOLIC BLOOD PRESSURE: 118 MMHG | RESPIRATION RATE: 18 BRPM | TEMPERATURE: 99 F | WEIGHT: 160.94 LBS | BODY MASS INDEX: 29.62 KG/M2

## 2024-03-22 DIAGNOSIS — M47.816 LUMBAR SPONDYLOSIS: Primary | ICD-10-CM

## 2024-03-22 NOTE — TELEPHONE ENCOUNTER
Reduction in pain of 80% x 2 hours starting pain score 8 and ending score 2 for 2 hours. Scheduled RFA for 04/05. Instructions given

## 2024-04-05 ENCOUNTER — HOSPITAL ENCOUNTER (OUTPATIENT)
Facility: HOSPITAL | Age: 79
Discharge: HOME | End: 2024-04-05
Attending: ANESTHESIOLOGY | Admitting: ANESTHESIOLOGY
Payer: MEDICARE

## 2024-04-05 DIAGNOSIS — M47.896 OTHER SPONDYLOSIS, LUMBAR REGION: ICD-10-CM

## 2024-04-05 PROCEDURE — 25000003 PHARM REV CODE 250: Performed by: ANESTHESIOLOGY

## 2024-04-05 PROCEDURE — 99152 MOD SED SAME PHYS/QHP 5/>YRS: CPT | Performed by: ANESTHESIOLOGY

## 2024-04-05 PROCEDURE — 36000704 HC OR TIME LEV I 1ST 15 MIN: Performed by: ANESTHESIOLOGY

## 2024-04-05 PROCEDURE — 63600175 PHARM REV CODE 636 W HCPCS: Performed by: ANESTHESIOLOGY

## 2024-04-05 PROCEDURE — 64635 DESTROY LUMB/SAC FACET JNT: CPT | Mod: 50,PN,, | Performed by: ANESTHESIOLOGY

## 2024-04-05 RX ORDER — SODIUM CHLORIDE, SODIUM LACTATE, POTASSIUM CHLORIDE, CALCIUM CHLORIDE 600; 310; 30; 20 MG/100ML; MG/100ML; MG/100ML; MG/100ML
INJECTION, SOLUTION INTRAVENOUS CONTINUOUS
Status: DISCONTINUED | OUTPATIENT
Start: 2024-04-05 | End: 2024-04-05 | Stop reason: HOSPADM

## 2024-04-05 RX ORDER — LIDOCAINE HYDROCHLORIDE 20 MG/ML
INJECTION, SOLUTION EPIDURAL; INFILTRATION; INTRACAUDAL; PERINEURAL
Status: DISCONTINUED | OUTPATIENT
Start: 2024-04-05 | End: 2024-04-05 | Stop reason: HOSPADM

## 2024-04-05 RX ORDER — LIDOCAINE HYDROCHLORIDE 10 MG/ML
INJECTION, SOLUTION EPIDURAL; INFILTRATION; INTRACAUDAL; PERINEURAL
Status: DISCONTINUED | OUTPATIENT
Start: 2024-04-05 | End: 2024-04-05 | Stop reason: HOSPADM

## 2024-04-05 RX ORDER — MIDAZOLAM HYDROCHLORIDE 1 MG/ML
INJECTION INTRAMUSCULAR; INTRAVENOUS
Status: DISCONTINUED | OUTPATIENT
Start: 2024-04-05 | End: 2024-04-05 | Stop reason: HOSPADM

## 2024-04-05 RX ORDER — LIDOCAINE HYDROCHLORIDE 10 MG/ML
1 INJECTION, SOLUTION EPIDURAL; INFILTRATION; INTRACAUDAL; PERINEURAL ONCE
Status: DISCONTINUED | OUTPATIENT
Start: 2024-04-05 | End: 2024-04-05 | Stop reason: HOSPADM

## 2024-04-05 RX ORDER — METHYLPREDNISOLONE ACETATE 80 MG/ML
INJECTION, SUSPENSION INTRA-ARTICULAR; INTRALESIONAL; INTRAMUSCULAR; SOFT TISSUE
Status: DISCONTINUED | OUTPATIENT
Start: 2024-04-05 | End: 2024-04-05 | Stop reason: HOSPADM

## 2024-04-05 RX ORDER — LIDOCAINE HYDROCHLORIDE 10 MG/ML
1 INJECTION, SOLUTION EPIDURAL; INFILTRATION; INTRACAUDAL; PERINEURAL ONCE
Status: COMPLETED | OUTPATIENT
Start: 2024-04-05 | End: 2024-04-05

## 2024-04-05 RX ORDER — BUPIVACAINE HYDROCHLORIDE 2.5 MG/ML
INJECTION, SOLUTION EPIDURAL; INFILTRATION; INTRACAUDAL
Status: DISCONTINUED | OUTPATIENT
Start: 2024-04-05 | End: 2024-04-05 | Stop reason: HOSPADM

## 2024-04-05 RX ORDER — FENTANYL CITRATE 50 UG/ML
INJECTION, SOLUTION INTRAMUSCULAR; INTRAVENOUS
Status: DISCONTINUED | OUTPATIENT
Start: 2024-04-05 | End: 2024-04-05 | Stop reason: HOSPADM

## 2024-04-05 RX ADMIN — LIDOCAINE HYDROCHLORIDE 10 MG: 10 INJECTION, SOLUTION EPIDURAL; INFILTRATION; INTRACAUDAL; PERINEURAL at 09:04

## 2024-04-05 RX ADMIN — SODIUM CHLORIDE, POTASSIUM CHLORIDE, SODIUM LACTATE AND CALCIUM CHLORIDE: 600; 310; 30; 20 INJECTION, SOLUTION INTRAVENOUS at 09:04

## 2024-04-05 NOTE — OP NOTE
PROCEDURE DATE: 4/5/2024    PROCEDURE:  Radiofrequency ablation of the medial branch nerves that innervate bilateral L4/5 and L5/S1 facets utilizing fluoroscopy    DIAGNOSIS:  Other spondylosis, lumbar region    Post op Diagnosis: Same    PHYSICIAN: Rory Moscoso MD    MEDICATIONS INJECTED:  From a mixture of 6ml of 0.25% bupivicaine and 80mg of methylprednisone,  1ml of this solution was injected at each level.    LOCAL ANESTHETIC USED: Lidocaine 1%, 2 ml given at each site.    SEDATION MEDICATIONS: RN IV sedation    ESTIMATED BLOOD LOSS:  None    COMPLICATIONS:  None    TECHNIQUE:  A time out was taken to identify patient and procedure side prior to starting the procedure. Laying in a prone position, the patient was prepped and draped in the usual sterile fashion using ChloraPrep and sterile towels.  The levels were determined under fluoroscopic guidance and then marked.  Local anesthetic was given by raising a wheal at the skin over each site and then infiltrated approximately 2cm deeper.  A 20-gauge  100 mm RF needle was introduced to the anatomic location of the medial branch nerves that innervate bilateral L4/5 and L5/S1 facets.  Motor stimulation up to 2 Volts at each level confirmed no motor nerve involvement.  Impedance was less than 800 ohms at each level.  1ml of 2% lidocaine was instilled prior to lesioning.  Ablation was performed per level utilizing radiofrequency generator 80°C for 90 seconds.  The above noted medication was then injected slowly. The patient tolerated the procedure well.     The patient was monitored after the procedure.  Patient was given post procedure and discharge instructions to follow at home.  The patient was discharged in a stable condition

## 2024-04-05 NOTE — PLAN OF CARE
Discharge instructions given to pt, verbalized understanding.  Tolerating PO fluids.  IV removed.  Denies pain.  Wheeled out to daughter in law  per RN in no distress.

## 2024-04-05 NOTE — DISCHARGE SUMMARY
[facility] - Periop Services  Discharge Note  Short Stay    Procedure(s) (LRB):  Radiofrequency Ablation, Nerve, Spinal, Lumbar, Medial Branch, 1 Level (Bilateral)      OUTCOME: Patient tolerated treatment/procedure well without complication and is now ready for discharge.    DISPOSITION: Home or Self Care    FINAL DIAGNOSIS:  <principal problem not specified>    FOLLOWUP: In clinic    DISCHARGE INSTRUCTIONS:    Discharge Procedure Orders   Notify your health care provider if you experience any of the following:  temperature >100.4     Notify your health care provider if you experience any of the following:  severe uncontrolled pain     Notify your health care provider if you experience any of the following:  redness, tenderness, or signs of infection (pain, swelling, redness, odor or green/yellow discharge around incision site)     Activity as tolerated        TIME SPENT ON DISCHARGE: 30 minutes

## 2024-04-08 VITALS
HEART RATE: 68 BPM | DIASTOLIC BLOOD PRESSURE: 64 MMHG | SYSTOLIC BLOOD PRESSURE: 145 MMHG | RESPIRATION RATE: 18 BRPM | TEMPERATURE: 99 F | OXYGEN SATURATION: 97 %

## 2024-05-13 ENCOUNTER — OFFICE VISIT (OUTPATIENT)
Dept: SPINE | Facility: CLINIC | Age: 79
End: 2024-05-13
Payer: MEDICARE

## 2024-05-13 VITALS — HEIGHT: 62 IN | BODY MASS INDEX: 29.62 KG/M2 | WEIGHT: 160.94 LBS

## 2024-05-13 DIAGNOSIS — M54.50 ACUTE MIDLINE LOW BACK PAIN WITHOUT SCIATICA: Primary | ICD-10-CM

## 2024-05-13 PROCEDURE — 99213 OFFICE O/P EST LOW 20 MIN: CPT | Mod: HCNC,S$GLB,, | Performed by: PHYSICAL MEDICINE & REHABILITATION

## 2024-05-13 PROCEDURE — 1160F RVW MEDS BY RX/DR IN RCRD: CPT | Mod: HCNC,CPTII,S$GLB, | Performed by: PHYSICAL MEDICINE & REHABILITATION

## 2024-05-13 PROCEDURE — 1101F PT FALLS ASSESS-DOCD LE1/YR: CPT | Mod: HCNC,CPTII,S$GLB, | Performed by: PHYSICAL MEDICINE & REHABILITATION

## 2024-05-13 PROCEDURE — 1125F AMNT PAIN NOTED PAIN PRSNT: CPT | Mod: HCNC,CPTII,S$GLB, | Performed by: PHYSICAL MEDICINE & REHABILITATION

## 2024-05-13 PROCEDURE — 3288F FALL RISK ASSESSMENT DOCD: CPT | Mod: HCNC,CPTII,S$GLB, | Performed by: PHYSICAL MEDICINE & REHABILITATION

## 2024-05-13 PROCEDURE — 99999 PR PBB SHADOW E&M-EST. PATIENT-LVL III: CPT | Mod: PBBFAC,HCNC,, | Performed by: PHYSICAL MEDICINE & REHABILITATION

## 2024-05-13 PROCEDURE — 1159F MED LIST DOCD IN RCRD: CPT | Mod: HCNC,CPTII,S$GLB, | Performed by: PHYSICAL MEDICINE & REHABILITATION

## 2024-05-13 NOTE — PROGRESS NOTES
SUBJECTIVE:    Patient ID: Kelly Queen is a 79 y.o. female.    Chief Complaint: Follow-up    She is here for follow-up status post radiofrequency ablation bilaterally L4-5 and L5-S1 L4-5 24 with Dr. Moscoso.  A good response to that procedure.  She describes 40% improvement in her low back pain symptoms with improved functional mobility.  She has been able to sew a little bit.  Still gets pain when she does her housework.  Current pain level is 2/10 but at times size 10/10.  Otherwise she has no new or progressive problems.  She not a back brace but she finds it too hot to wear          Past Medical History:   Diagnosis Date    Arthritis     Carpal tunnel syndrome 2/25/15    left; last injected 2/25/15    Chronic kidney disease, stage III (moderate) 11/14/2014    Hypertension      Social History     Socioeconomic History    Marital status:    Tobacco Use    Smoking status: Never    Smokeless tobacco: Never   Substance and Sexual Activity    Alcohol use: No    Drug use: No     Past Surgical History:   Procedure Laterality Date    APPENDECTOMY      COLONOSCOPY  3/30/2010    Dr. Prince, ten year recheck    FEMUR FRACTURE SURGERY Left 5/21/15    IM rodding (L) distal femur    FRACTURE SURGERY      5- femer     HYSTERECTOMY      INJECTION OF ANESTHETIC AGENT AROUND MEDIAL BRANCH NERVES INNERVATING LUMBAR FACET JOINT Bilateral 3/5/2024    Procedure: Block-nerve-medial branch-lumbar;  Surgeon: Rory Moscoso MD;  [facility];  Service: Anesthesiology;  Laterality: Bilateral;  L4/5 and l5/s1 MBB    INJECTION OF ANESTHETIC AGENT AROUND MEDIAL BRANCH NERVES INNERVATING LUMBAR FACET JOINT Bilateral 3/21/2024    Procedure: Block-nerve-medial branch-lumbar;  Surgeon: Rory Moscoso MD;  Location: [facility];  Service: Anesthesiology;  Laterality: Bilateral;  L4/5 and l5/s1 MBB #2    JOINT REPLACEMENT Left 6/7/13    (L) TKA    OOPHORECTOMY      RADIOFREQUENCY ABLATION OF LUMBAR MEDIAL BRANCH NERVE AT SINGLE  "LEVEL Bilateral 4/5/2024    Procedure: Radiofrequency Ablation, Nerve, Spinal, Lumbar, Medial Branch, 1 Level;  Surgeon: Rory Moscoso MD;  Location: [facility];  Service: Anesthesiology;  Laterality: Bilateral;  L4/5 and l5/s1 RFA   Burned at 80 degrees C. for 105 seconds each site    TONSILLECTOMY       Family History   Problem Relation Name Age of Onset    Diabetes Sister      Cancer Sister          colon    Hypertension Sister      Diabetes Brother      Cancer Brother          lung    Basal cell carcinoma Daughter luna     Skin cancer Daughter luna         squnamus     Hypertension Mother      No Known Problems Son      Diabetes Brother      Heart disease Brother       Vitals:    05/13/24 0940   Weight: 73 kg (160 lb 15 oz)   Height: 5' 2" (1.575 m)       Review of Systems   Constitutional:  Negative for chills, diaphoresis, fatigue, fever and unexpected weight change.   HENT:  Negative for trouble swallowing.    Eyes:  Negative for visual disturbance.   Respiratory:  Negative for shortness of breath.    Cardiovascular:  Negative for chest pain.   Gastrointestinal:  Negative for abdominal pain, constipation, nausea and vomiting.   Genitourinary:  Negative for difficulty urinating.   Musculoskeletal:  Negative for arthralgias, back pain, gait problem, joint swelling, myalgias, neck pain and neck stiffness.   Neurological:  Negative for dizziness, speech difficulty, weakness, light-headedness, numbness and headaches.          Objective:      Physical Exam  Neurological:      Mental Status: She is alert and oriented to person, place, and time.             Assessment:       1. Acute midline low back pain without sciatica           Plan:     Improved to her satisfaction status post radiofrequency ablation bilaterally L4-5 and L5-S1.  We can repeat that procedure as needed.  Follow up here as needed.  Continue activity as tolerated.  I gave her some information about spinal cord stimulators which she can " consider at her discretion      Acute midline low back pain without sciatica

## 2024-06-13 ENCOUNTER — OFFICE VISIT (OUTPATIENT)
Dept: FAMILY MEDICINE | Facility: CLINIC | Age: 79
End: 2024-06-13
Attending: FAMILY MEDICINE
Payer: MEDICARE

## 2024-06-13 VITALS
WEIGHT: 158.75 LBS | HEIGHT: 62 IN | BODY MASS INDEX: 29.21 KG/M2 | DIASTOLIC BLOOD PRESSURE: 78 MMHG | TEMPERATURE: 99 F | HEART RATE: 74 BPM | OXYGEN SATURATION: 98 % | SYSTOLIC BLOOD PRESSURE: 150 MMHG

## 2024-06-13 DIAGNOSIS — G89.29 CHRONIC BILATERAL LOW BACK PAIN WITHOUT SCIATICA: ICD-10-CM

## 2024-06-13 DIAGNOSIS — R73.03 PREDIABETES: ICD-10-CM

## 2024-06-13 DIAGNOSIS — I10 PRIMARY HYPERTENSION: ICD-10-CM

## 2024-06-13 DIAGNOSIS — N18.31 STAGE 3A CHRONIC KIDNEY DISEASE: ICD-10-CM

## 2024-06-13 DIAGNOSIS — Z00.00 ENCOUNTER FOR PREVENTIVE HEALTH EXAMINATION: Primary | ICD-10-CM

## 2024-06-13 DIAGNOSIS — M54.50 CHRONIC BILATERAL LOW BACK PAIN WITHOUT SCIATICA: ICD-10-CM

## 2024-06-13 DIAGNOSIS — E78.5 DYSLIPIDEMIA: ICD-10-CM

## 2024-06-13 DIAGNOSIS — M17.0 PRIMARY OSTEOARTHRITIS OF BOTH KNEES: ICD-10-CM

## 2024-06-13 PROCEDURE — 99999 PR PBB SHADOW E&M-EST. PATIENT-LVL IV: CPT | Mod: PBBFAC,HCNC,, | Performed by: FAMILY MEDICINE

## 2024-06-13 PROCEDURE — 3288F FALL RISK ASSESSMENT DOCD: CPT | Mod: HCNC,CPTII,S$GLB, | Performed by: FAMILY MEDICINE

## 2024-06-13 PROCEDURE — 3078F DIAST BP <80 MM HG: CPT | Mod: HCNC,CPTII,S$GLB, | Performed by: FAMILY MEDICINE

## 2024-06-13 PROCEDURE — 1160F RVW MEDS BY RX/DR IN RCRD: CPT | Mod: HCNC,CPTII,S$GLB, | Performed by: FAMILY MEDICINE

## 2024-06-13 PROCEDURE — 99397 PER PM REEVAL EST PAT 65+ YR: CPT | Mod: HCNC,S$GLB,, | Performed by: FAMILY MEDICINE

## 2024-06-13 PROCEDURE — 1101F PT FALLS ASSESS-DOCD LE1/YR: CPT | Mod: HCNC,CPTII,S$GLB, | Performed by: FAMILY MEDICINE

## 2024-06-13 PROCEDURE — 3077F SYST BP >= 140 MM HG: CPT | Mod: HCNC,CPTII,S$GLB, | Performed by: FAMILY MEDICINE

## 2024-06-13 PROCEDURE — 1159F MED LIST DOCD IN RCRD: CPT | Mod: HCNC,CPTII,S$GLB, | Performed by: FAMILY MEDICINE

## 2024-06-13 PROCEDURE — 1125F AMNT PAIN NOTED PAIN PRSNT: CPT | Mod: HCNC,CPTII,S$GLB, | Performed by: FAMILY MEDICINE

## 2024-06-13 NOTE — PROGRESS NOTES
Subjective:       Patient ID: Kelly Queen is a 79 y.o. female.    Chief Complaint: Annual Exam    79-year-old female coming in for annual exam.  She has a history of hypertension, hyperlipidemia, prediabetes, and stage IIIA chronic kidney disease with osteoarthritis of the knees.  In October she began experiencing some pain in her left thoracolumbar area that has spread around the abdomen into a general abdominal pain.  She had a urinalysis that did not show any blood or evidence of infection and an x-ray was done of the back that showed levoscoliosis with multi level subluxations and general degenerative disc and joint disease.  An MRI was done by Dr. Cho showing more detailed findings but also some renal cysts which were evaluated with ultrasound and shown to be just simple cyst with no intervention needed.  There was no evidence of a solid tumor.  She is taking Tylenol and getting fairly good relief of the pain so she can sleep at night and gradually it seems to be improving especially after a radiofrequency ablation of which she has had several interventions over March and April of this year.  Her blood pressure is elevated today but she is in pain and she has not been checking it at home recently.    Past Medical History:  No date: Arthritis  2/25/15: Carpal tunnel syndrome      Comment:  left; last injected 2/25/15  11/14/2014: Chronic kidney disease, stage III (moderate)  No date: Hypertension h    Past Surgical History:  No date: APPENDECTOMY  3/30/2010: COLONOSCOPY      Comment:  Dr. Prince, ten year recheck  5/21/15: FEMUR FRACTURE SURGERY; Left      Comment:  IM rodding (L) distal femur  No date: FRACTURE SURGERY      Comment:  5- femer   No date: HYSTERECTOMY  3/5/2024: INJECTION OF ANESTHETIC AGENT AROUND MEDIAL BRANCH NERVES   INNERVATING LUMBAR FACET JOINT; Bilateral      Comment:  Procedure: Block-nerve-medial branch-lumbar;  Surgeon:                Rory Moscoso MD;  Location: [facility]  OR;  Service:                Anesthesiology;  Laterality: Bilateral;  L4/5 and l5/s1                MBB  3/21/2024: INJECTION OF ANESTHETIC AGENT AROUND MEDIAL BRANCH NERVES   INNERVATING LUMBAR FACET JOINT; Bilateral      Comment:  Procedure: Block-nerve-medial branch-lumbar;  Surgeon:                Rory Moscoso MD;  Location: [facility] OR;  Service:                Anesthesiology;  Laterality: Bilateral;  L4/5 and l5/s1                MBB #2  6/7/13: JOINT REPLACEMENT; Left      Comment:  (L) TKA  No date: OOPHORECTOMY  4/5/2024: RADIOFREQUENCY ABLATION OF LUMBAR MEDIAL BRANCH NERVE AT   SINGLE LEVEL; Bilateral      Comment:  Procedure: Radiofrequency Ablation, Nerve, Spinal,                Lumbar, Medial Branch, 1 Level;  Surgeon: Rory Moscoso MD;  Location: [facility] OR;  Service: Anesthesiology;                 Laterality: Bilateral;  L4/5 and l5/s1 RFA   Burned at 80               degrees C. for 105 seconds each site  No date: TONSILLECTOMY    Review of patient's family history indicates:  Problem: Diabetes      Relation: Sister          Name:               Age of Onset: (Not Specified)  Problem: Cancer      Relation: Sister          Name:               Age of Onset: (Not Specified)              Comment: colon  Problem: Hypertension      Relation: Sister          Name:               Age of Onset: (Not Specified)  Problem: Diabetes      Relation: Brother          Name:               Age of Onset: (Not Specified)  Problem: Cancer      Relation: Brother          Name:               Age of Onset: (Not Specified)              Comment: lung  Problem: Basal cell carcinoma      Relation: Daughter          Name: luna              Age of Onset: (Not Specified)  Problem: Skin cancer      Relation: Daughter          Name: luna              Age of Onset: (Not Specified)              Comment: squnamus   Problem: Hypertension      Relation: Mother          Name:               Age of Onset: (Not  Specified)  Problem: No Known Problems      Relation: Son          Name:               Age of Onset: (Not Specified)  Problem: Diabetes      Relation: Brother          Name:               Age of Onset: (Not Specified)  Problem: Heart disease      Relation: Brother          Name:               Age of Onset: (Not Specified)    Social History    Tobacco Use      Smoking status: Never      Smokeless tobacco: Never    Alcohol use: No    Drug use: No          Review of Systems   Constitutional:  Negative for chills, diaphoresis, fatigue, fever and unexpected weight change.   HENT:  Negative for congestion, ear pain, hearing loss, postnasal drip and sinus pressure.    Eyes:  Negative for itching and visual disturbance.   Respiratory:  Negative for cough, chest tightness, shortness of breath and wheezing.    Cardiovascular:  Negative for chest pain, palpitations and leg swelling.   Gastrointestinal:  Positive for abdominal pain. Negative for blood in stool, constipation, diarrhea, nausea and vomiting.   Genitourinary:  Negative for dysuria, frequency and hematuria.   Musculoskeletal:  Positive for back pain. Negative for arthralgias, joint swelling and myalgias.   Neurological:  Negative for dizziness and headaches.   Hematological:  Negative for adenopathy.   Psychiatric/Behavioral:  Negative for sleep disturbance. The patient is not nervous/anxious.        Objective:      Physical Exam  Vitals and nursing note reviewed.   Constitutional:       General: She is not in acute distress.     Appearance: Normal appearance. She is well-developed. She is not ill-appearing, toxic-appearing or diaphoretic.      Comments: Elevated systolic blood pressure, patient is in pain   Normal pulse with regular rhythm   Overweight with a BMI of 29.0 she is down 5 lb from her June 12, 2023 visit with me   HENT:      Head: Normocephalic and atraumatic.      Right Ear: Tympanic membrane, ear canal and external ear normal. There is no impacted  cerumen.      Left Ear: Tympanic membrane, ear canal and external ear normal. There is no impacted cerumen.      Ears:      Comments: Normally wears hearing aids but left them out so we could check her ears today     Nose: Nose normal. No congestion or rhinorrhea.      Mouth/Throat:      Mouth: Mucous membranes are moist.      Pharynx: Oropharynx is clear. No oropharyngeal exudate or posterior oropharyngeal erythema.   Eyes:      General: No scleral icterus.        Right eye: No discharge.         Left eye: No discharge.      Extraocular Movements: Extraocular movements intact.      Conjunctiva/sclera: Conjunctivae normal.      Pupils: Pupils are equal, round, and reactive to light.   Neck:      Thyroid: No thyromegaly.      Vascular: No JVD.   Cardiovascular:      Rate and Rhythm: Normal rate and regular rhythm.      Heart sounds: Normal heart sounds. No murmur heard.     No friction rub. No gallop.   Pulmonary:      Effort: Pulmonary effort is normal. No respiratory distress.      Breath sounds: Normal breath sounds. No stridor. No wheezing, rhonchi or rales.   Chest:      Chest wall: No tenderness.   Abdominal:      General: Bowel sounds are normal. There is no distension.      Palpations: Abdomen is soft. There is no mass.      Tenderness: There is no abdominal tenderness. There is no guarding or rebound.      Hernia: No hernia is present.   Musculoskeletal:         General: No swelling, tenderness, deformity or signs of injury.      Cervical back: Neck supple.      Thoracic back: No tenderness or bony tenderness.      Lumbar back: No tenderness or bony tenderness. Decreased range of motion. Scoliosis present.      Right lower leg: No edema.      Left lower leg: No edema.   Lymphadenopathy:      Cervical: No cervical adenopathy.   Skin:     General: Skin is warm and dry.      Coloration: Skin is not jaundiced or pale.      Findings: No bruising, erythema, lesion or rash.   Neurological:      General: No focal  deficit present.      Mental Status: She is alert and oriented to person, place, and time. Mental status is at baseline.      Cranial Nerves: No cranial nerve deficit.      Sensory: No sensory deficit.      Motor: No weakness.      Coordination: Coordination normal.      Gait: Gait normal.      Deep Tendon Reflexes: Reflexes are normal and symmetric. Reflexes normal.   Psychiatric:         Mood and Affect: Mood normal.         Behavior: Behavior normal.         Thought Content: Thought content normal.         Judgment: Judgment normal.         Assessment:       1. Encounter for preventive health examination    2. Primary hypertension    3. Dyslipidemia    4. Stage 3a chronic kidney disease    5. Prediabetes    6. Primary osteoarthritis of both knees    7. Chronic bilateral low back pain without sciatica    8. BMI 29.0-29.9,adult        Plan:       1. Encounter for preventive health examination  - Comprehensive Metabolic Panel; Future  - Lipid Panel; Future  - CBC Auto Differential; Future    2. Primary hypertension  Elevated systolic blood pressure possibly secondary to pain.  Patient requested to get four or five readings over the next week at home and forward them to me for evaluation.  She has a large supply of all of her medications thanks to auto refill so if we are going to change the lisinopril HCT we may need to add a lisinopril 10 to her current pill to use them up.  - Comprehensive Metabolic Panel; Future  - Lipid Panel; Future  - CBC Auto Differential; Future    3. Dyslipidemia  Await lipid panel results  - Comprehensive Metabolic Panel; Future  - Lipid Panel; Future    4. Stage 3a chronic kidney disease  Await chemistry panel results  - Comprehensive Metabolic Panel; Future    5. Prediabetes  Await fasting glucose and A1c results  - Comprehensive Metabolic Panel; Future  - Hemoglobin A1C; Future    6. Primary osteoarthritis of both knees  Fairly stable, suggested she might want to use the Voltaren gel to  the back and knees    7. Chronic bilateral low back pain without sciatica  Slowly improving    8. BMI 29.0-29.9,adult  Improving

## 2024-06-14 ENCOUNTER — LAB VISIT (OUTPATIENT)
Dept: LAB | Facility: HOSPITAL | Age: 79
End: 2024-06-14
Attending: FAMILY MEDICINE
Payer: MEDICARE

## 2024-06-14 DIAGNOSIS — R73.03 PREDIABETES: ICD-10-CM

## 2024-06-14 DIAGNOSIS — I10 PRIMARY HYPERTENSION: ICD-10-CM

## 2024-06-14 DIAGNOSIS — N18.31 STAGE 3A CHRONIC KIDNEY DISEASE: ICD-10-CM

## 2024-06-14 DIAGNOSIS — E78.5 DYSLIPIDEMIA: ICD-10-CM

## 2024-06-14 DIAGNOSIS — Z00.00 ENCOUNTER FOR PREVENTIVE HEALTH EXAMINATION: ICD-10-CM

## 2024-06-14 LAB
ALBUMIN SERPL BCP-MCNC: 3.9 G/DL (ref 3.5–5.2)
ALP SERPL-CCNC: 52 U/L (ref 55–135)
ALT SERPL W/O P-5'-P-CCNC: 12 U/L (ref 10–44)
ANION GAP SERPL CALC-SCNC: 9 MMOL/L (ref 8–16)
AST SERPL-CCNC: 18 U/L (ref 10–40)
BASOPHILS # BLD AUTO: 0.08 K/UL (ref 0–0.2)
BASOPHILS NFR BLD: 0.9 % (ref 0–1.9)
BILIRUB SERPL-MCNC: 0.4 MG/DL (ref 0.1–1)
BUN SERPL-MCNC: 20 MG/DL (ref 8–23)
CALCIUM SERPL-MCNC: 9.6 MG/DL (ref 8.7–10.5)
CHLORIDE SERPL-SCNC: 111 MMOL/L (ref 95–110)
CHOLEST SERPL-MCNC: 146 MG/DL (ref 120–199)
CHOLEST/HDLC SERPL: 3.1 {RATIO} (ref 2–5)
CO2 SERPL-SCNC: 25 MMOL/L (ref 23–29)
CREAT SERPL-MCNC: 0.8 MG/DL (ref 0.5–1.4)
DIFFERENTIAL METHOD BLD: ABNORMAL
EOSINOPHIL # BLD AUTO: 1 K/UL (ref 0–0.5)
EOSINOPHIL NFR BLD: 10.6 % (ref 0–8)
ERYTHROCYTE [DISTWIDTH] IN BLOOD BY AUTOMATED COUNT: 13.6 % (ref 11.5–14.5)
EST. GFR  (NO RACE VARIABLE): >60 ML/MIN/1.73 M^2
ESTIMATED AVG GLUCOSE: 134 MG/DL (ref 68–131)
GLUCOSE SERPL-MCNC: 120 MG/DL (ref 70–110)
HBA1C MFR BLD: 6.3 % (ref 4–5.6)
HCT VFR BLD AUTO: 39.5 % (ref 37–48.5)
HDLC SERPL-MCNC: 47 MG/DL (ref 40–75)
HDLC SERPL: 32.2 % (ref 20–50)
HGB BLD-MCNC: 12.9 G/DL (ref 12–16)
IMM GRANULOCYTES # BLD AUTO: 0.02 K/UL (ref 0–0.04)
IMM GRANULOCYTES NFR BLD AUTO: 0.2 % (ref 0–0.5)
LDLC SERPL CALC-MCNC: 78.6 MG/DL (ref 63–159)
LYMPHOCYTES # BLD AUTO: 3.2 K/UL (ref 1–4.8)
LYMPHOCYTES NFR BLD: 35.7 % (ref 18–48)
MCH RBC QN AUTO: 31 PG (ref 27–31)
MCHC RBC AUTO-ENTMCNC: 32.7 G/DL (ref 32–36)
MCV RBC AUTO: 95 FL (ref 82–98)
MONOCYTES # BLD AUTO: 0.8 K/UL (ref 0.3–1)
MONOCYTES NFR BLD: 8.5 % (ref 4–15)
NEUTROPHILS # BLD AUTO: 4 K/UL (ref 1.8–7.7)
NEUTROPHILS NFR BLD: 44.1 % (ref 38–73)
NONHDLC SERPL-MCNC: 99 MG/DL
NRBC BLD-RTO: 0 /100 WBC
PLATELET # BLD AUTO: 257 K/UL (ref 150–450)
PMV BLD AUTO: 11.5 FL (ref 9.2–12.9)
POTASSIUM SERPL-SCNC: 4.2 MMOL/L (ref 3.5–5.1)
PROT SERPL-MCNC: 6.5 G/DL (ref 6–8.4)
RBC # BLD AUTO: 4.16 M/UL (ref 4–5.4)
SODIUM SERPL-SCNC: 145 MMOL/L (ref 136–145)
TRIGL SERPL-MCNC: 102 MG/DL (ref 30–150)
WBC # BLD AUTO: 8.98 K/UL (ref 3.9–12.7)

## 2024-06-14 PROCEDURE — 80061 LIPID PANEL: CPT | Mod: HCNC | Performed by: FAMILY MEDICINE

## 2024-06-14 PROCEDURE — 85025 COMPLETE CBC W/AUTO DIFF WBC: CPT | Mod: HCNC | Performed by: FAMILY MEDICINE

## 2024-06-14 PROCEDURE — 80053 COMPREHEN METABOLIC PANEL: CPT | Mod: HCNC | Performed by: FAMILY MEDICINE

## 2024-06-14 PROCEDURE — 36415 COLL VENOUS BLD VENIPUNCTURE: CPT | Mod: HCNC,PO | Performed by: FAMILY MEDICINE

## 2024-06-14 PROCEDURE — 83036 HEMOGLOBIN GLYCOSYLATED A1C: CPT | Mod: HCNC | Performed by: FAMILY MEDICINE

## 2024-06-19 ENCOUNTER — PATIENT MESSAGE (OUTPATIENT)
Dept: FAMILY MEDICINE | Facility: CLINIC | Age: 79
End: 2024-06-19
Payer: MEDICARE

## 2024-06-19 DIAGNOSIS — I10 PRIMARY HYPERTENSION: Primary | ICD-10-CM

## 2024-06-19 RX ORDER — LISINOPRIL 10 MG/1
TABLET ORAL
Qty: 60 TABLET | Refills: 0 | Status: SHIPPED | OUTPATIENT
Start: 2024-06-19

## 2024-06-19 NOTE — TELEPHONE ENCOUNTER
Elevated systolic blood pressure possibly secondary to pain. Patient requested to get four or five readings over the next week at home and forward them to me for evaluation. She has a large supply of all of her medications thanks to auto refill so if we are going to change the lisinopril HCT we may need to add a lisinopril 10 to her current pill to use them up.

## 2024-08-01 ENCOUNTER — PATIENT MESSAGE (OUTPATIENT)
Dept: FAMILY MEDICINE | Facility: CLINIC | Age: 79
End: 2024-08-01
Payer: MEDICARE

## 2024-08-01 DIAGNOSIS — I10 PRIMARY HYPERTENSION: ICD-10-CM

## 2024-08-01 RX ORDER — LISINOPRIL AND HYDROCHLOROTHIAZIDE 10; 12.5 MG/1; MG/1
2 TABLET ORAL DAILY
Start: 2024-08-01

## 2024-08-27 ENCOUNTER — PATIENT MESSAGE (OUTPATIENT)
Dept: FAMILY MEDICINE | Facility: CLINIC | Age: 79
End: 2024-08-27
Payer: MEDICARE

## 2024-08-27 NOTE — TELEPHONE ENCOUNTER
The diastolics/lower numbers are fine.  I would like all of the systolics to be below 130 if we could get them there without making her dizzy but overall it is better.  It may continue to improve over time, can we get a nurse blood pressure check in about four weeks?

## 2024-09-16 ENCOUNTER — TELEPHONE (OUTPATIENT)
Dept: SPINE | Facility: CLINIC | Age: 79
End: 2024-09-16
Payer: MEDICARE

## 2024-09-16 NOTE — TELEPHONE ENCOUNTER
----- Message from Stacey M Lefort sent at 9/16/2024  8:47 AM CDT -----  Daughter is requesting a nurse callback as soon as possible at [phone]

## 2024-09-16 NOTE — TELEPHONE ENCOUNTER
"Spoke to pt daughter in law who stated pt approached her yesterday and stated "I think my back or my rib is broken". I advised her Dr. Cho was out of the office and to take pt to urgent care. She voiced understanding and has follow up scheduled with Dr. Cho 9/18 once he returns to clinic.   "

## 2024-09-18 ENCOUNTER — OFFICE VISIT (OUTPATIENT)
Dept: SPINE | Facility: CLINIC | Age: 79
End: 2024-09-18
Payer: MEDICARE

## 2024-09-18 VITALS — BODY MASS INDEX: 27.59 KG/M2 | WEIGHT: 149.94 LBS | HEIGHT: 62 IN

## 2024-09-18 DIAGNOSIS — M54.6 PAIN IN THORACIC SPINE: Primary | ICD-10-CM

## 2024-09-18 PROCEDURE — 99999 PR PBB SHADOW E&M-EST. PATIENT-LVL III: CPT | Mod: PBBFAC,HCNC,, | Performed by: PHYSICAL MEDICINE & REHABILITATION

## 2024-09-18 RX ORDER — METHYLPREDNISOLONE ACETATE 40 MG/ML
40 INJECTION, SUSPENSION INTRA-ARTICULAR; INTRALESIONAL; INTRAMUSCULAR; SOFT TISSUE
Status: COMPLETED | OUTPATIENT
Start: 2024-09-18 | End: 2024-09-18

## 2024-09-18 RX ORDER — METHYLPREDNISOLONE 4 MG/1
TABLET ORAL
Qty: 1 EACH | Refills: 0 | Status: SHIPPED | OUTPATIENT
Start: 2024-09-18 | End: 2024-10-09

## 2024-09-18 RX ORDER — KETOROLAC TROMETHAMINE 30 MG/ML
30 INJECTION, SOLUTION INTRAMUSCULAR; INTRAVENOUS ONCE
Status: COMPLETED | OUTPATIENT
Start: 2024-09-18 | End: 2024-09-18

## 2024-09-18 RX ADMIN — METHYLPREDNISOLONE ACETATE 40 MG: 40 INJECTION, SUSPENSION INTRA-ARTICULAR; INTRALESIONAL; INTRAMUSCULAR; SOFT TISSUE at 10:09

## 2024-09-18 RX ADMIN — KETOROLAC TROMETHAMINE 30 MG: 30 INJECTION, SOLUTION INTRAMUSCULAR; INTRAVENOUS at 10:09

## 2024-09-18 NOTE — PROGRESS NOTES
SUBJECTIVE:    Patient ID: Kelly Queen is a 79 y.o. female.    Chief Complaint: Mid-back Pain    She presents today with a 4 day history of pain on the left side at thoracolumbar junction.  She says she was standing at the sink and simply took a step to the left and developed acute pain.  Ended up going to the urgent care on 09/16/2024 and was prescribed Voltaren gel Lidoderm patches Robaxin tramadol and Zofran.  She has not been able to tolerate the tramadol due to nausea.  She has not started taking the Robaxin.  The Lidoderm has been useful.  She denies changes to her bowel or bladder function.  No radicular symptoms.  Pain level is 8/10 and interferes with quality of life in terms of activities of daily living recreation and social activities.  I reviewed x-rays of the thoracic and lumbar spine done at urgent care which show no acute fractures.  She has a known T12 compression fracture from around February of this year.  We did not intervene with kyphoplasty because she was not profoundly symptomatic at the time.  She also had rib x-rays which were negative for rib fracture          Past Medical History:   Diagnosis Date    Arthritis     Carpal tunnel syndrome 2/25/15    left; last injected 2/25/15    Chronic kidney disease, stage III (moderate) 11/14/2014    Hypertension      Social History     Socioeconomic History    Marital status:    Tobacco Use    Smoking status: Never    Smokeless tobacco: Never   Substance and Sexual Activity    Alcohol use: No    Drug use: No     Past Surgical History:   Procedure Laterality Date    APPENDECTOMY      COLONOSCOPY  3/30/2010    Dr. Prince, ten year recheck    FEMUR FRACTURE SURGERY Left 5/21/15    IM rodding (L) distal femur    FRACTURE SURGERY      5- femer     HYSTERECTOMY      INJECTION OF ANESTHETIC AGENT AROUND MEDIAL BRANCH NERVES INNERVATING LUMBAR FACET JOINT Bilateral 3/5/2024    Procedure: Block-nerve-medial branch-lumbar;  Surgeon: Rory Moscoso  "MD PAUL;  Location: [facility] OR;  Service: Anesthesiology;  Laterality: Bilateral;  L4/5 and l5/s1 MBB    INJECTION OF ANESTHETIC AGENT AROUND MEDIAL BRANCH NERVES INNERVATING LUMBAR FACET JOINT Bilateral 3/21/2024    Procedure: Block-nerve-medial branch-lumbar;  Surgeon: Rory Moscoso MD;  Location: [facility] OR;  Service: Anesthesiology;  Laterality: Bilateral;  L4/5 and l5/s1 MBB #2    JOINT REPLACEMENT Left 6/7/13    (L) TKA    OOPHORECTOMY      RADIOFREQUENCY ABLATION OF LUMBAR MEDIAL BRANCH NERVE AT SINGLE LEVEL Bilateral 4/5/2024    Procedure: Radiofrequency Ablation, Nerve, Spinal, Lumbar, Medial Branch, 1 Level;  Surgeon: Rory Moscoso MD;  Location: [facility] OR;  Service: Anesthesiology;  Laterality: Bilateral;  L4/5 and l5/s1 RFA   Burned at 80 degrees C. for 105 seconds each site    TONSILLECTOMY       Family History   Problem Relation Name Age of Onset    Diabetes Sister      Cancer Sister          colon    Hypertension Sister      Diabetes Brother      Cancer Brother          lung    Basal cell carcinoma Daughter luna     Skin cancer Daughter luna         squnamus     Hypertension Mother      No Known Problems Son      Diabetes Brother      Heart disease Brother       Vitals:    09/18/24 0949   Weight: 68 kg (149 lb 14.6 oz)   Height: 5' 2" (1.575 m)       Review of Systems   Constitutional:  Negative for chills, diaphoresis, fatigue, fever and unexpected weight change.   HENT:  Negative for trouble swallowing.    Eyes:  Negative for visual disturbance.   Respiratory:  Negative for shortness of breath.    Cardiovascular:  Negative for chest pain.   Gastrointestinal:  Negative for abdominal pain, constipation, nausea and vomiting.   Genitourinary:  Negative for difficulty urinating.   Musculoskeletal:  Negative for arthralgias, back pain, gait problem, joint swelling, myalgias, neck pain and neck stiffness.   Neurological:  Negative for dizziness, speech difficulty, weakness, light-headedness, " numbness and headaches.          Objective:      Physical Exam  Neurological:      Mental Status: She is alert and oriented to person, place, and time.      Comments: She is awake and in no acute distress  Mild tenderness to palpation left sided paraspinous musculature at the thoracolumbar junction with no palpable masses  No significant pain on percussion over the thoracic or lumbar spine             Assessment:       1. Pain in thoracic spine           Plan:     Etiology of her current discomfort is unclear.  Possible worsening of the known T12 compression fracture (although it appears stable radiographically) which would be consistent with her clinical picture.  We are going to give her a shot of Depo-Medrol and Toradol followed by a Medrol Dosepak.  She should continue the Lidoderm and try the Robaxin as needed for pain.  We will check on her by phone in 1 week to see how she is coming along.  Consider updated MRI if she fails to respond to the steroids      Pain in thoracic spine    Other orders  -     methylPREDNISolone acetate injection 40 mg  -     ketorolac injection 30 mg  -     methylPREDNISolone (MEDROL DOSEPACK) 4 mg tablet; use as directed  Dispense: 1 each; Refill: 0

## 2024-09-24 ENCOUNTER — TELEPHONE (OUTPATIENT)
Dept: FAMILY MEDICINE | Facility: CLINIC | Age: 79
End: 2024-09-24

## 2024-09-24 ENCOUNTER — CLINICAL SUPPORT (OUTPATIENT)
Dept: FAMILY MEDICINE | Facility: CLINIC | Age: 79
End: 2024-09-24
Payer: MEDICARE

## 2024-09-24 VITALS — SYSTOLIC BLOOD PRESSURE: 150 MMHG | DIASTOLIC BLOOD PRESSURE: 60 MMHG | HEART RATE: 82 BPM

## 2024-09-24 DIAGNOSIS — I10 HYPERTENSION, UNSPECIFIED TYPE: Primary | ICD-10-CM

## 2024-09-24 NOTE — TELEPHONE ENCOUNTER
Patient came in today for blood pressure check.  160/64 p82 large manual cuff- tried x-large manual cuff due to size of patients arms 150/60. Patient does have chronic back pain which is at 3/10 at rest today. Had recent Medrol dose pack for it.

## 2024-09-25 DIAGNOSIS — M54.6 PAIN IN THORACIC SPINE: Primary | ICD-10-CM

## 2024-09-25 DIAGNOSIS — M54.9 DORSALGIA, UNSPECIFIED: ICD-10-CM

## 2024-10-02 ENCOUNTER — HOSPITAL ENCOUNTER (OUTPATIENT)
Dept: RADIOLOGY | Facility: HOSPITAL | Age: 79
Discharge: HOME | End: 2024-10-02
Attending: PHYSICAL MEDICINE & REHABILITATION
Payer: MEDICARE

## 2024-10-02 DIAGNOSIS — M54.6 PAIN IN THORACIC SPINE: ICD-10-CM

## 2024-10-02 DIAGNOSIS — M54.9 DORSALGIA, UNSPECIFIED: ICD-10-CM

## 2024-10-02 DIAGNOSIS — E78.5 DYSLIPIDEMIA: ICD-10-CM

## 2024-10-02 PROCEDURE — 72146 MRI CHEST SPINE W/O DYE: CPT | Mod: 26,,, | Performed by: RADIOLOGY

## 2024-10-02 PROCEDURE — 72148 MRI LUMBAR SPINE W/O DYE: CPT | Mod: 26,,, | Performed by: RADIOLOGY

## 2024-10-02 PROCEDURE — 72146 MRI CHEST SPINE W/O DYE: CPT | Mod: TC

## 2024-10-02 PROCEDURE — 72148 MRI LUMBAR SPINE W/O DYE: CPT | Mod: TC

## 2024-10-02 RX ORDER — ROSUVASTATIN CALCIUM 20 MG/1
20 TABLET, COATED ORAL NIGHTLY
Qty: 90 TABLET | Refills: 2 | Status: SHIPPED | OUTPATIENT
Start: 2024-10-02

## 2024-10-02 NOTE — TELEPHONE ENCOUNTER
----- Message from Jennifer sent at 10/2/2024  4:38 PM CDT -----  Regarding: Refill  Type:  RX Refill Request    Who Called: RX    Refill or New Rx:Refill    RX Name and Strength:rosuvastatin (CRESTOR) 20 MG tablet       Preferred Pharmacy with phone number:[facility]    Local or Mail Order:Mail      Would the patient rather a call back or a response via [facility]? Call back    Best Call Back Number:[phone]     Additional Information: Thank you

## 2024-10-02 NOTE — TELEPHONE ENCOUNTER
Refill Decision Note   Kelly Queen  is requesting a refill authorization.  Brief Assessment and Rationale for Refill:  Approve     Medication Therapy Plan:         Comments:     Note composed:5:24 PM 10/02/2024

## 2024-10-02 NOTE — TELEPHONE ENCOUNTER
No care due was identified.  [facility] Embedded Care Due Messages. Reference number: [id number].   10/02/2024 5:01:07 PM CDT

## 2024-10-03 ENCOUNTER — PATIENT MESSAGE (OUTPATIENT)
Dept: SPINE | Facility: CLINIC | Age: 79
End: 2024-10-03

## 2024-10-03 ENCOUNTER — OFFICE VISIT (OUTPATIENT)
Dept: SPINE | Facility: CLINIC | Age: 79
End: 2024-10-03
Payer: MEDICARE

## 2024-10-03 ENCOUNTER — TELEPHONE (OUTPATIENT)
Dept: PAIN MEDICINE | Facility: CLINIC | Age: 79
End: 2024-10-03
Payer: MEDICARE

## 2024-10-03 ENCOUNTER — HOSPITAL ENCOUNTER (OUTPATIENT)
Dept: RADIOLOGY | Facility: HOSPITAL | Age: 79
Discharge: HOME | End: 2024-10-03
Attending: PHYSICAL MEDICINE & REHABILITATION
Payer: MEDICARE

## 2024-10-03 VITALS — HEIGHT: 62 IN | WEIGHT: 149.94 LBS | BODY MASS INDEX: 27.59 KG/M2

## 2024-10-03 DIAGNOSIS — R93.7 ABNORMAL MRI, CERVICAL SPINE: Primary | ICD-10-CM

## 2024-10-03 DIAGNOSIS — R22.9 LOCALIZED SWELLING, MASS AND LUMP, UNSPECIFIED: ICD-10-CM

## 2024-10-03 DIAGNOSIS — M53.3 COCCYDYNIA: ICD-10-CM

## 2024-10-03 DIAGNOSIS — M54.14 THORACIC RADICULOPATHY: Primary | ICD-10-CM

## 2024-10-03 PROCEDURE — 72220 X-RAY EXAM SACRUM TAILBONE: CPT | Mod: TC

## 2024-10-03 PROCEDURE — 99999 PR PBB SHADOW E&M-EST. PATIENT-LVL III: CPT | Mod: PBBFAC,HCNC,, | Performed by: PHYSICAL MEDICINE & REHABILITATION

## 2024-10-03 PROCEDURE — 72220 X-RAY EXAM SACRUM TAILBONE: CPT | Mod: 26,,, | Performed by: RADIOLOGY

## 2024-10-03 RX ORDER — METHYLPREDNISOLONE ACETATE 40 MG/ML
40 INJECTION, SUSPENSION INTRA-ARTICULAR; INTRALESIONAL; INTRAMUSCULAR; SOFT TISSUE
Status: COMPLETED | OUTPATIENT
Start: 2024-10-03 | End: 2024-10-03

## 2024-10-03 RX ORDER — KETOROLAC TROMETHAMINE 30 MG/ML
30 INJECTION, SOLUTION INTRAMUSCULAR; INTRAVENOUS ONCE
Status: COMPLETED | OUTPATIENT
Start: 2024-10-03 | End: 2024-10-03

## 2024-10-03 RX ADMIN — METHYLPREDNISOLONE ACETATE 40 MG: 40 INJECTION, SUSPENSION INTRA-ARTICULAR; INTRALESIONAL; INTRAMUSCULAR; SOFT TISSUE at 03:10

## 2024-10-03 RX ADMIN — KETOROLAC TROMETHAMINE 30 MG: 30 INJECTION, SOLUTION INTRAMUSCULAR; INTRAVENOUS at 03:10

## 2024-10-03 NOTE — TELEPHONE ENCOUNTER
----- Message from Jas hCo MD sent at 10/3/2024  3:34 PM CDT -----  Please schedule for interlaminar injection T10-11

## 2024-10-03 NOTE — PROGRESS NOTES
SUBJECTIVE:    Patient ID: Kelly Queen is a 79 y.o. female.    Chief Complaint: Follow-up    She is here to review MRIs of the thoracic and lumbar spine done 10/02/2024 to evaluate her complaints of  pain on the left side at thoracolumbar junction.      The studies are summarized en bloc below:      FINDINGS:  THORACIC:     Morphology: Redemonstrated chronic superior endplate compression fracture deformity of T12 with approximately 40-50% height loss anteriorly and mild osseous retropulsion on the order of 2 mm.  No additional fractures.  Marrow signal is overall within normal limits aside from minimal reactive Modic type 1 marrow edema along the superior endplate T12.     Alignment: Sagittal alignment is within normal limits.  Broad dextrocurvature.     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  Differential considerations include a meningioma or schwannoma.  Intramedullary lesion is not excluded requiring additional evaluation with contrast-enhanced MRI of the cervical spine.  The thoracic cord is mildly flattened ventrally at T7-T8 and to a greater extent T10-T11 by broad-based disc protrusions with suspected mild chronic myelomalacia at both of these levels.  The posterior CSF spaces are preserved.     Degenerative changes: Multilevel mild to moderate degenerative disc height loss and shallow disc bulging.  Tiny left lateralizing disc protrusion at T6-T7 without substantial spinal canal narrowing.  Broad-based central disc protrusion at T7-T8 and to a greater extent T10-T11 as well as facet arthrosis and ligamentum flavum thickening producing moderate narrowing of the spinal canal at T10-T11.  No more than mild narrowing of the spinal canal at T7-T8.  No high-grade foraminal narrowing.     Other:  Limited imaging of the paraspinal soft tissues, chest, and abdomen demonstrates no focal abnormality.     LUMBAR:     Morphology: Mild Modic type 1 endplate centered marrow edema asymmetric to the right at L1-L2.  Marrow signal is otherwise within normal limits and vertebral body heights are preserved.  No acute fractures.     Alignment: Grade 1 retrolisthesis of L1 on L2, L2 on L3, and L3 on L4 and grade 1 anterolisthesis of L4 on L5.  Mild broad levocurvature.     Cord: The conus medullaris is normal in contour and caliber terminating appropriately the mid L2 vertebral body level.     Disc levels:     T12-L1: Shallow disc bulging and mild bilateral facet arthrosis flattening the ventral thecal sac without substantial spinal canal or foraminal narrowing.     L1-L2: Severe degenerative disc height loss with shallow disc bulging and osteophytic endplate spurring as well as mild to moderate bilateral facet arthrosis and ligamentum flavum thickening producing mild narrowing of the spinal canal and subarticular recesses and moderate right/mild left foraminal narrowing.     L2-L3: Moderate degenerative disc height loss and shallow disc bulging as well as moderate facet arthrosis and ligamentum flavum thickening producing mild narrowing of the spinal canal and subarticular recesses, left greater than right.  Moderate bilateral foraminal narrowing.     L3-L4: Mild degenerative disc height loss and shallow disc bulging as well as moderate left/mild right facet arthrosis with ligamentum flavum thickening producing mild circumferential spinal canal and subarticular recess narrowing as well as moderate bilateral foraminal narrowing.     L4-L5: Moderate degenerative disc height loss with shallow disc bulging and severe bilateral facet arthrosis with ligamentum flavum thickening producing moderate circumferential spinal canal and subarticular recess narrowing as well as moderate right and mild left foraminal narrowing.      L5-S1: Moderate degenerative disc height loss with shallow disc bulging and moderate to severe bilateral facet arthrosis without substantial narrowing of the spinal canal.  Mild to moderate left and mild right foraminal narrowing.     Other: Redemonstrated presumed bilateral renal cysts.     Impression:     THORACIC:     1. Partially imaged suspected mass within the spinal canal at the lower cervical spinal level at C6-C7 which appears to be intradural and extramedullary in location as described above.  This produces mass effect on the lower cervical cord.  Further characterization with contrast-enhanced MRI of the cervical spine/cord recommended if not already obtained.  2. Stable chronic T12 compression fracture.  No acute process within the thoracic spine.  3. Degenerative changes most notable for broad-based disc protrusions at T7-T8 and T10-T11 producing moderate spinal canal narrowing at T10-T11 and flattening of the ventral cord surface with suspected chronic myelomalacia changes noting some mild volume loss of the cord in the region.  No high-grade foraminal narrowing.  LUMBAR:     1. No acute process or fracture of the lumbar spine.  2. Similar degenerative changes compared to previous MRI 02/16/2024 with multifactorial spinal canal and foraminal narrowing notable for moderate narrowing of the spinal canal at L4-L5 and moderate right L1-L2, moderate bilateral L2-L3, moderate bilateral L3-L4, and moderate right L4-L5 foraminal narrowing.  This report was flagged in Epic as abnormal.      Clinically she is about the same.  Her symptoms are described above.  However she says that yesterday when she was getting off the MRI table she developed pain over sacrum and coccyx which has persisted.  She did not have a fall.  She is still having the pain in the lower thoracic region although it has decreased some.  Her pain level is 8/10 but at times as high as 10/10 and interferes with quality of life in terms of  activities of daily living recreation and social activities.  She is not having any cervical radicular symptoms.            Past Medical History:   Diagnosis Date    Arthritis     Carpal tunnel syndrome 2/25/15    left; last injected 2/25/15    Chronic kidney disease, stage III (moderate) 11/14/2014    Hypertension      Social History     Socioeconomic History    Marital status:    Tobacco Use    Smoking status: Never    Smokeless tobacco: Never   Substance and Sexual Activity    Alcohol use: No    Drug use: No     Past Surgical History:   Procedure Laterality Date    APPENDECTOMY      COLONOSCOPY  3/30/2010    Dr. Prince, ten year recheck    FEMUR FRACTURE SURGERY Left 5/21/15    IM rodding (L) distal femur    FRACTURE SURGERY      5- femer     HYSTERECTOMY      INJECTION OF ANESTHETIC AGENT AROUND MEDIAL BRANCH NERVES INNERVATING LUMBAR FACET JOINT Bilateral 3/5/2024    Procedure: Block-nerve-medial branch-lumbar;  Surgeon: Rory Moscoso MD;  Location: [facility] OR;  Service: Anesthesiology;  Laterality: Bilateral;  L4/5 and l5/s1 MBB    INJECTION OF ANESTHETIC AGENT AROUND MEDIAL BRANCH NERVES INNERVATING LUMBAR FACET JOINT Bilateral 3/21/2024    Procedure: Block-nerve-medial branch-lumbar;  Surgeon: Rory Moscoso MD;  [facility];  Service: Anesthesiology;  Laterality: Bilateral;  L4/5 and l5/s1 MBB #2    JOINT REPLACEMENT Left 6/7/13    (L) TKA    OOPHORECTOMY      RADIOFREQUENCY ABLATION OF LUMBAR MEDIAL BRANCH NERVE AT SINGLE LEVEL Bilateral 4/5/2024    Procedure: Radiofrequency Ablation, Nerve, Spinal, Lumbar, Medial Branch, 1 Level;  Surgeon: Rory Moscoso MD;  Location: [facility] OR;  Service: Anesthesiology;  Laterality: Bilateral;  L4/5 and l5/s1 RFA   Burned at 80 degrees C. for 105 seconds each site    TONSILLECTOMY       Family History   Problem Relation Name Age of Onset    Diabetes Sister      Cancer Sister          colon    Hypertension Sister      Diabetes Brother      Cancer  "Brother          lung    Basal cell carcinoma Daughter luna     Skin cancer Daughter luna waters     Hypertension Mother      No Known Problems Son      Diabetes Brother      Heart disease Brother       Vitals:    10/03/24 1429   Weight: 68 kg (149 lb 14.6 oz)   Height: 5' 2" (1.575 m)       Review of Systems   Constitutional:  Negative for chills, diaphoresis, fatigue, fever and unexpected weight change.   HENT:  Negative for trouble swallowing.    Eyes:  Negative for visual disturbance.   Respiratory:  Negative for shortness of breath.    Cardiovascular:  Negative for chest pain.   Gastrointestinal:  Negative for abdominal pain, constipation, nausea and vomiting.   Genitourinary:  Negative for difficulty urinating.   Musculoskeletal:  Negative for arthralgias, back pain, gait problem, joint swelling, myalgias, neck pain and neck stiffness.   Neurological:  Negative for dizziness, speech difficulty, weakness, light-headedness, numbness and headaches.          Objective:      Physical Exam  Neurological:      Mental Status: She is alert and oriented to person, place, and time.      Comments: She is awake and in no acute distress  She has tenderness to palpation over the lower sacrum/coccyx.             Assessment:       1. Abnormal MRI, cervical spine    2. Localized swelling, mass and lump, unspecified    3. Coccydynia           Plan:     We will get an MRI of the cervical spine with and without contrast to evaluate the incidental finding of a mass at the C6-7 level.  We will get some x-rays on sacrum and coccyx.  For pain relief shot of Depo-Medrol and Toradol.  We will check on her by phone in about 1 week regarding that issue.  For ongoing lower thoracic pain with thoracic radiculitis I am recommending interlaminar injection at T10-11.  I will see her back in the office after the procedure      Abnormal MRI, cervical spine  -     Creatinine, serum; Future; Expected date: 10/03/2024    Localized " swelling, mass and lump, unspecified  -     MRI Cervical Spine W WO Cont; Future; Expected date: 10/03/2024    Coccydynia  -     X-Ray Sacrum And Coccyx; Future; Expected date: 10/03/2024    Other orders  -     methylPREDNISolone acetate injection 40 mg  -     ketorolac injection 30 mg

## 2024-10-04 ENCOUNTER — PATIENT MESSAGE (OUTPATIENT)
Dept: SPINE | Facility: CLINIC | Age: 79
End: 2024-10-04
Payer: MEDICARE

## 2024-10-10 ENCOUNTER — HOSPITAL ENCOUNTER (OUTPATIENT)
Dept: RADIOLOGY | Facility: HOSPITAL | Age: 79
Discharge: HOME | End: 2024-10-10
Attending: PHYSICAL MEDICINE & REHABILITATION
Payer: MEDICARE

## 2024-10-10 DIAGNOSIS — R22.9 LOCALIZED SWELLING, MASS AND LUMP, UNSPECIFIED: ICD-10-CM

## 2024-10-10 LAB
CREAT SERPL-MCNC: 1 MG/DL (ref 0.5–1.4)
SAMPLE: NORMAL

## 2024-10-10 PROCEDURE — 25500020 PHARM REV CODE 255: Mod: PO | Performed by: PHYSICAL MEDICINE & REHABILITATION

## 2024-10-10 PROCEDURE — 72156 MRI NECK SPINE W/O & W/DYE: CPT | Mod: TC,PO

## 2024-10-10 PROCEDURE — 72156 MRI NECK SPINE W/O & W/DYE: CPT | Mod: 26,,, | Performed by: RADIOLOGY

## 2024-10-10 PROCEDURE — A9585 GADOBUTROL INJECTION: HCPCS | Mod: PO | Performed by: PHYSICAL MEDICINE & REHABILITATION

## 2024-10-10 RX ORDER — GADOBUTROL 604.72 MG/ML
6.5 INJECTION INTRAVENOUS
Status: COMPLETED | OUTPATIENT
Start: 2024-10-10 | End: 2024-10-10

## 2024-10-10 RX ADMIN — GADOBUTROL 6.5 ML: 604.72 INJECTION INTRAVENOUS at 04:10

## 2024-10-15 ENCOUNTER — OFFICE VISIT (OUTPATIENT)
Dept: SPINE | Facility: CLINIC | Age: 79
End: 2024-10-15
Payer: MEDICARE

## 2024-10-15 VITALS — BODY MASS INDEX: 27.59 KG/M2 | WEIGHT: 149.94 LBS | HEIGHT: 62 IN

## 2024-10-15 DIAGNOSIS — G95.89 CERVICAL SPINAL MASS: Primary | ICD-10-CM

## 2024-10-15 PROCEDURE — 1125F AMNT PAIN NOTED PAIN PRSNT: CPT | Mod: CPTII,S$GLB,, | Performed by: PHYSICAL MEDICINE & REHABILITATION

## 2024-10-15 PROCEDURE — 1101F PT FALLS ASSESS-DOCD LE1/YR: CPT | Mod: CPTII,S$GLB,, | Performed by: PHYSICAL MEDICINE & REHABILITATION

## 2024-10-15 PROCEDURE — 1160F RVW MEDS BY RX/DR IN RCRD: CPT | Mod: CPTII,S$GLB,, | Performed by: PHYSICAL MEDICINE & REHABILITATION

## 2024-10-15 PROCEDURE — 3288F FALL RISK ASSESSMENT DOCD: CPT | Mod: CPTII,S$GLB,, | Performed by: PHYSICAL MEDICINE & REHABILITATION

## 2024-10-15 PROCEDURE — 99213 OFFICE O/P EST LOW 20 MIN: CPT | Mod: S$GLB,,, | Performed by: PHYSICAL MEDICINE & REHABILITATION

## 2024-10-15 PROCEDURE — 1159F MED LIST DOCD IN RCRD: CPT | Mod: CPTII,S$GLB,, | Performed by: PHYSICAL MEDICINE & REHABILITATION

## 2024-10-15 PROCEDURE — 99999 PR PBB SHADOW E&M-EST. PATIENT-LVL III: CPT | Mod: PBBFAC,,, | Performed by: PHYSICAL MEDICINE & REHABILITATION

## 2024-10-15 NOTE — PROGRESS NOTES
SUBJECTIVE:    Patient ID: Kelly Queen is a 79 y.o. female.    Chief Complaint: Follow-up    She is here to review her cervical MRI done with and without contrast on 10/10/2024 to further evaluate the incidental finding of a mass in the spinal canal at the C6-7 level seen incidentally on her recent thoracic MRI.  She has no symptoms referable to the cervical spine.  The study is summarized below:      FINDINGS:  Multiplanar pre and post infusion imaging was performed.     Corresponding with findings on recent thoracic spine MRI, there is an irregularly-shaped soft tissue mass within the spinal canal primarily to the left of midline, extending from the superior endplate of C6 to the inferior endplate of C7.  Based on axial images, this is favored to reflect an intradural extra medullary mass, measuring 3.0 x 1.4 x 1.1 cm in craniocaudal, transverse, and AP dimensions respectively.  There is pronounced extrinsic mass effect upon the cervical spinal cord at this level, which is displaced to the right of midline.  There is no evidence of osseous invasion or foraminal extension.  Following contrast administration, there is diffuse homogeneous enhancement of the mass.  No significant cord edema is appreciated.  On contrast-enhanced axial images, there is suggestion of a small dural tail.  This may reflect an atypical meningioma.  Other etiologies including nerve sheath tumor, metastatic disease, or even lymphoma are within the differential.  Neuro surgical consultation is recommended.     There is no cervical fracture or significant subluxation.  No osseous destructive lesion is identified.     C2-3: No significant abnormalities.     C3-4: Mild broad-based disc bulge with mild narrowing of the central canal.  Uncinate process hypertrophy results in mild right-sided foraminal stenosis.     C4-5: Broad-based disc/osteophyte complex results in mild spinal stenosis.  There is moderate left and moderate to severe right  foraminal stenosis secondary to uncinate process and facet hypertrophy.     C5-6: Broad-based disc/osteophyte complex results in mild narrowing of the spinal canal.  There is moderate bilateral foraminal stenosis secondary to uncinate process and mild facet hypertrophy.     C6-7: Mild broad-based disc bulge results in mild mass effect upon the ventral thecal sac.  There is mild right greater than left foraminal narrowing.     C7-T1: No significant abnormalities.     Impression:     1. Homogeneously enhancing mass lesion within the spinal canal primarily to the left of midline at the C6 and C7 levels, with resultant severe compression of the spinal cord.  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  Additional differential possibilities are noted above.  Neuro surgical consultation is recommended.  2. Additional findings of multilevel cervical degenerative disc/facet disease.  Details as noted at each individual level above.      Clinically she is about the same.  Today she does describe some chronic posterior neck discomfort at the cervicothoracic junction but she does not have any radicular symptoms or myelopathic symptoms.  She is scheduled to have her thoracic epidural steroid injection tomorrow.  Pain level is 8/10.  Otherwise she has no new or progressive problems            Past Medical History:   Diagnosis Date    Arthritis     Carpal tunnel syndrome 2/25/15    left; last injected 2/25/15    Chronic kidney disease, stage III (moderate) 11/14/2014    Hypertension      Social History     Socioeconomic History    Marital status:    Tobacco Use    Smoking status: Never    Smokeless tobacco: Never   Substance and Sexual Activity    Alcohol use: No    Drug use: No     Past Surgical History:   Procedure Laterality Date    APPENDECTOMY      COLONOSCOPY  3/30/2010    Dr. Prince, ten year recheck    FEMUR FRACTURE SURGERY Left 5/21/15    IM rodding (L) distal femur  "   FRACTURE SURGERY      5- femer     HYSTERECTOMY      INJECTION OF ANESTHETIC AGENT AROUND MEDIAL BRANCH NERVES INNERVATING LUMBAR FACET JOINT Bilateral 3/5/2024    Procedure: Block-nerve-medial branch-lumbar;  Surgeon: Rory Moscoso MD;  [facility];  Service: Anesthesiology;  Laterality: Bilateral;  L4/5 and l5/s1 MBB    INJECTION OF ANESTHETIC AGENT AROUND MEDIAL BRANCH NERVES INNERVATING LUMBAR FACET JOINT Bilateral 3/21/2024    Procedure: Block-nerve-medial branch-lumbar;  Surgeon: Rory Moscoso MD;  [facility];  Service: Anesthesiology;  Laterality: Bilateral;  L4/5 and l5/s1 MBB #2    JOINT REPLACEMENT Left 6/7/13    (L) TKA    OOPHORECTOMY      RADIOFREQUENCY ABLATION OF LUMBAR MEDIAL BRANCH NERVE AT SINGLE LEVEL Bilateral 4/5/2024    Procedure: Radiofrequency Ablation, Nerve, Spinal, Lumbar, Medial Branch, 1 Level;  Surgeon: Rory Moscoso MD;  Location: [facility] OR;  Service: Anesthesiology;  Laterality: Bilateral;  L4/5 and l5/s1 RFA   Burned at 80 degrees C. for 105 seconds each site    TONSILLECTOMY       Family History   Problem Relation Name Age of Onset    Diabetes Sister      Cancer Sister          colon    Hypertension Sister      Diabetes Brother      Cancer Brother          lung    Basal cell carcinoma Daughter luna     Skin cancer Daughter luna         squnamus     Hypertension Mother      No Known Problems Son      Diabetes Brother      Heart disease Brother       Vitals:    10/15/24 1016   Weight: 68 kg (149 lb 14.6 oz)   Height: 5' 2" (1.575 m)       Review of Systems   Constitutional:  Negative for chills, diaphoresis, fatigue, fever and unexpected weight change.   HENT:  Negative for trouble swallowing.    Eyes:  Negative for visual disturbance.   Respiratory:  Negative for shortness of breath.    Cardiovascular:  Negative for chest pain.   Gastrointestinal:  Negative for abdominal pain, constipation, nausea and vomiting.   Genitourinary:  Negative for " difficulty urinating.   Musculoskeletal:  Negative for arthralgias, back pain, gait problem, joint swelling, myalgias, neck pain and neck stiffness.   Neurological:  Negative for dizziness, speech difficulty, weakness, light-headedness, numbness and headaches.          Objective:      Physical Exam  Neurological:      Mental Status: She is alert and oriented to person, place, and time.      Comments: She is awake and in no acute distress  Reflexes- +1-+2 reflexes at the following:   C5-Biceps   C6-Brachioradialis   C7-Triceps   L3/4-Patellar   S1-Achilles   Filomena sign is negative bilaterally  Strength testing- 5/5 strength in the following muscle groups:  C5-Elbow flexion  C6-Wrist extension  C7-Elbow extension  C8-Finger flexion  T1-Finger abduction  L2-Hip flexion  L3-Knee extension  L4-Ankle dorsiflexion  L5-Great toe extension  S1-Ankle plantar flexion                    Assessment:       1. Cervical spinal mass           Plan:     She remains neurologically intact with no signs or symptoms of myelopathy.  We will refer her to Neurosurgery.  She can follow up with me in the office after she has her epidural.      Cervical spinal mass  -     Ambulatory referral/consult to Neurosurgery; Future; Expected date: 10/22/2024

## 2024-10-16 ENCOUNTER — HOSPITAL ENCOUNTER (OUTPATIENT)
Facility: HOSPITAL | Age: 79
Discharge: HOME | End: 2024-10-16
Attending: ANESTHESIOLOGY | Admitting: ANESTHESIOLOGY
Payer: MEDICARE

## 2024-10-16 ENCOUNTER — OFFICE VISIT (OUTPATIENT)
Dept: NEUROSURGERY | Facility: CLINIC | Age: 79
End: 2024-10-16
Payer: MEDICARE

## 2024-10-16 VITALS
HEIGHT: 62 IN | SYSTOLIC BLOOD PRESSURE: 154 MMHG | WEIGHT: 156.88 LBS | DIASTOLIC BLOOD PRESSURE: 72 MMHG | BODY MASS INDEX: 28.87 KG/M2 | HEART RATE: 77 BPM

## 2024-10-16 DIAGNOSIS — M48.02 CERVICAL STENOSIS OF SPINAL CANAL: Primary | ICD-10-CM

## 2024-10-16 DIAGNOSIS — D49.7 INTRADURAL EXTRAMEDULLARY SPINAL TUMOR: ICD-10-CM

## 2024-10-16 DIAGNOSIS — M54.14 THORACIC RADICULOPATHY: ICD-10-CM

## 2024-10-16 LAB — POCT GLUCOSE: 100 MG/DL (ref 70–110)

## 2024-10-16 PROCEDURE — 1159F MED LIST DOCD IN RCRD: CPT | Mod: CPTII,S$GLB,, | Performed by: NEUROLOGICAL SURGERY

## 2024-10-16 PROCEDURE — 36000704 HC OR TIME LEV I 1ST 15 MIN: Performed by: ANESTHESIOLOGY

## 2024-10-16 PROCEDURE — 1101F PT FALLS ASSESS-DOCD LE1/YR: CPT | Mod: CPTII,S$GLB,, | Performed by: NEUROLOGICAL SURGERY

## 2024-10-16 PROCEDURE — 25000003 PHARM REV CODE 250: Performed by: ANESTHESIOLOGY

## 2024-10-16 PROCEDURE — 3288F FALL RISK ASSESSMENT DOCD: CPT | Mod: CPTII,S$GLB,, | Performed by: NEUROLOGICAL SURGERY

## 2024-10-16 PROCEDURE — 3077F SYST BP >= 140 MM HG: CPT | Mod: CPTII,S$GLB,, | Performed by: NEUROLOGICAL SURGERY

## 2024-10-16 PROCEDURE — 1125F AMNT PAIN NOTED PAIN PRSNT: CPT | Mod: CPTII,S$GLB,, | Performed by: NEUROLOGICAL SURGERY

## 2024-10-16 PROCEDURE — 99204 OFFICE O/P NEW MOD 45 MIN: CPT | Mod: S$GLB,,, | Performed by: NEUROLOGICAL SURGERY

## 2024-10-16 PROCEDURE — 63600175 PHARM REV CODE 636 W HCPCS: Performed by: ANESTHESIOLOGY

## 2024-10-16 PROCEDURE — 62321 NJX INTERLAMINAR CRV/THRC: CPT | Mod: ,,, | Performed by: ANESTHESIOLOGY

## 2024-10-16 PROCEDURE — 71000015 HC POSTOP RECOV 1ST HR: Performed by: ANESTHESIOLOGY

## 2024-10-16 PROCEDURE — A4216 STERILE WATER/SALINE, 10 ML: HCPCS | Performed by: ANESTHESIOLOGY

## 2024-10-16 PROCEDURE — 3078F DIAST BP <80 MM HG: CPT | Mod: CPTII,S$GLB,, | Performed by: NEUROLOGICAL SURGERY

## 2024-10-16 PROCEDURE — 25500020 PHARM REV CODE 255: Performed by: ANESTHESIOLOGY

## 2024-10-16 RX ORDER — SODIUM CHLORIDE 9 MG/ML
INJECTION, SOLUTION INTRAMUSCULAR; INTRAVENOUS; SUBCUTANEOUS
Status: DISCONTINUED | OUTPATIENT
Start: 2024-10-16 | End: 2024-10-16 | Stop reason: HOSPADM

## 2024-10-16 RX ORDER — MIDAZOLAM HYDROCHLORIDE 1 MG/ML
INJECTION INTRAMUSCULAR; INTRAVENOUS
Status: DISCONTINUED | OUTPATIENT
Start: 2024-10-16 | End: 2024-10-16 | Stop reason: HOSPADM

## 2024-10-16 RX ORDER — FENTANYL CITRATE 50 UG/ML
INJECTION, SOLUTION INTRAMUSCULAR; INTRAVENOUS
Status: DISCONTINUED | OUTPATIENT
Start: 2024-10-16 | End: 2024-10-16 | Stop reason: HOSPADM

## 2024-10-16 RX ORDER — LIDOCAINE HYDROCHLORIDE 10 MG/ML
INJECTION, SOLUTION EPIDURAL; INFILTRATION; INTRACAUDAL; PERINEURAL
Status: DISCONTINUED | OUTPATIENT
Start: 2024-10-16 | End: 2024-10-16 | Stop reason: HOSPADM

## 2024-10-16 RX ORDER — DEXAMETHASONE SODIUM PHOSPHATE 10 MG/ML
INJECTION INTRAMUSCULAR; INTRAVENOUS
Status: DISCONTINUED | OUTPATIENT
Start: 2024-10-16 | End: 2024-10-16 | Stop reason: HOSPADM

## 2024-10-16 NOTE — PLAN OF CARE
Pt DC to car by [facility] . Son here to take pt home. DC instructions reviewed with pt and pt's family. Also pt has portal to review DC instructions due to printer being down at this time hospital wide.

## 2024-10-16 NOTE — H&P (VIEW-ONLY)
HPI:  This is a very pleasant 79-year-old woman who presents for evaluation regarding an incidental MRI cervical finding.  She is currently being treated for thoracolumbar pain.  Recent MRI thoracic identified the intracanicular finding in the cervical region prompting MRI cervical with and without gadolinium.  She endorses waxing and waning chronic neck pain which is tolerable.  She denies shooting arm pain, upper extremity weakness, hand numbness, hand incoordination, dropping objects, imbalance, falls.  She denies bladder/bowel urgency or incontinence.  She reports aching stabbing pain in the thoracolumbar region.  The pain is worse with standing upright  Consequently she has recently begun using a single-point cane when ambulating.  She is scheduled for a T10-T11 LISA later today.    Smoking status:  Never  Past Medical History:   Diagnosis Date    Arthritis     Carpal tunnel syndrome 2/25/15    left; last injected 2/25/15    Chronic kidney disease, stage III (moderate) 11/14/2014    Hypertension       Past Surgical History:   Procedure Laterality Date    APPENDECTOMY      COLONOSCOPY  3/30/2010    Dr. Prince, ten year recheck    FEMUR FRACTURE SURGERY Left 5/21/15    IM rodding (L) distal femur    FRACTURE SURGERY      5- femer     HYSTERECTOMY      INJECTION OF ANESTHETIC AGENT AROUND MEDIAL BRANCH NERVES INNERVATING LUMBAR FACET JOINT Bilateral 3/5/2024    Procedure: Block-nerve-medial branch-lumbar;  Surgeon: Rory Moscoso MD;  Location: [facility];  Service: Anesthesiology;  Laterality: Bilateral;  L4/5 and l5/s1 MBB    INJECTION OF ANESTHETIC AGENT AROUND MEDIAL BRANCH NERVES INNERVATING LUMBAR FACET JOINT Bilateral 3/21/2024    Procedure: Block-nerve-medial branch-lumbar;  Surgeon: Rory Moscoso MD;  Location: [facility] OR;  Service: Anesthesiology;  Laterality: Bilateral;  L4/5 and l5/s1 MBB #2    JOINT REPLACEMENT Left 6/7/13    (L) TKA    OOPHORECTOMY      RADIOFREQUENCY ABLATION OF LUMBAR MEDIAL  BRANCH NERVE AT SINGLE LEVEL Bilateral 4/5/2024    Procedure: Radiofrequency Ablation, Nerve, Spinal, Lumbar, Medial Branch, 1 Level;  Surgeon: Rory Moscoso MD;  Location: [facility] OR;  Service: Anesthesiology;  Laterality: Bilateral;  L4/5 and l5/s1 RFA   Burned at 80 degrees C. for 105 seconds each site    TONSILLECTOMY       Social History     Tobacco Use    Smoking status: Never    Smokeless tobacco: Never   Substance Use Topics    Alcohol use: No    Drug use: No       Review of Systems: All systems reviewed and are as above or otherwise negative.    General: well developed, well nourished, no distress.   Head: normocephalic, atraumatic  Eyes: pupils equal, round, reactive to light with accomodation, EOMI.   Neck: trachea midline. No JVD  Cardiovascular: No LE edema   Pulmonary: normal respirations, no signs of respiratory distress  Abdomen: soft, non-distended, not tender to palpation  Sensory: intact to light touch throughout  Extremities: No bruising, deformity  Skin: Skin is warm, dry and intact.    Motor Strength: Moves all extremities spontaneously with good tone.  Full strength upper and lower extremities. No abnormal movements seen.     Strength  Deltoids Triceps Biceps Wrist Extension Wrist Flexion Hand    Upper: R 5/5 5/5 5/5 5/5 5/5 5/5    L 5/5 5/5 5/5 5/5 5/5 5/5     Iliopsoas Quadriceps Knee  Flexion Tibialis  anterior Gastro- cnemius EHL   Lower: R 5/5 5/5 5/5 5/5 5/5 5/5    L 5/5 5/5 5/5 5/5 5/5 5/5     Neurologic: Alert and oriented. Thought content appropriate.  GCS: Motor: 6/Verbal: 5/Eyes: 4 GCS Total: 15  Mental Status: Awake, Alert, Oriented x 4  Language: No aphasia  Speech: No dysarthria  Cranial nerves: face symmetric, tongue midline, CN II-XII grossly intact.     Pronator Drift: no drift noted  Finger-to-nose: Intact bilaterally  DTR's: 2 + and symmetric in UE and LE  Rodarte: absent  Clonus: absent  Babinski: absent    Gait: normal    Tandem Gait: No difficulty           Able to  walk on heels & toes    Cervical Spine  ROM: full    Nontender to palpation    Lumbar Spine   ROM: full   Tender to palpation    Pain on Hip ROM: Negative  Straight leg raise: negative bilaterally     SI Joint tenderness: Negative bilaterally   ROGER: Negative bilaterally      Significant Exam Findings:  Motor and sensory intact.  Absent Filomena's.  No hyperreflexia.  Tandem walking normal.    Significant Radiology Findings:  I personally reviewed MRI cervical spine with and without contrast 10/10/2024.  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  Age-appropriate degenerative changes midcervical region.    irregularly-shaped soft tissue mass within the spinal canal primarily to the left of midline, extending from the superior endplate of C6 to the inferior endplate of C7. Based on axial images, this is favored to reflect an intradural extra medullary mass, measuring 3.0 x 1.4 x 1.1 cm in craniocaudal, transverse, and AP dimensions respectively. There is pronounced extrinsic mass effect upon the cervical spinal cord at this level, which is displaced to the right of midline. There is no evidence of osseous invasion or foraminal extension. Following contrast administration, there is diffuse homogeneous enhancement of the mass. No significant cord edema is appreciated. On contrast-enhanced axial images, there is suggestion of a small dural tail.     I also reviewed MRI thoracic and lumbar 10/2024.  Multilevel degenerative changes.  Multilevel degenerative spondylolisthesis lumbar.  Moderate canal stenosis T10-11.    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  Despite the cord mass effect she has no myelopathy signs or symptoms.  Risks outweigh benefits for surgical resection at this point.  I recommend close surveillance with repeat MRI cervical with and without gadolinium in 6  "months.  Continue symptom management relative to thoracolumbar pain as indicated.  We will see her back with the MRI cervical for review.  I advised her regarding myelopathy symptoms.  I counseled her to be promptly re-evaluated if she develops these symptoms.  She voiced understanding.  Multiple family members present today.    Kelly Watt" was seen today for back pain and neck pain.    Diagnoses and all orders for this visit:    Cervical stenosis of spinal canal    Intradural extramedullary spinal tumor  -     Ambulatory referral/consult to Neurosurgery        I spent a total of 45 minutes on the day of the visit.  This includes face to face time and non-face to face time preparing to see the patient (eg, review of tests), obtaining and/or reviewing separately obtained history, documenting clinical information in the electronic or other health record, independently interpreting results and communicating results to the patient/family/caregiver, or care coordinator.      "

## 2024-10-16 NOTE — DISCHARGE SUMMARY
[facility]  Discharge Note  Short Stay    Procedure(s) (LRB):  Injection-steroid-epidural-thoracic t10-11 (N/A)      OUTCOME: Patient tolerated treatment/procedure well without complication and is now ready for discharge.    DISPOSITION: Home or Self Care    FINAL DIAGNOSIS:  <principal problem not specified>    FOLLOWUP: In clinic    DISCHARGE INSTRUCTIONS:    Discharge Procedure Orders   Notify your health care provider if you experience any of the following:  temperature >100.4     Notify your health care provider if you experience any of the following:  severe uncontrolled pain     Notify your health care provider if you experience any of the following:  redness, tenderness, or signs of infection (pain, swelling, redness, odor or green/yellow discharge around incision site)     Activity as tolerated        TIME SPENT ON DISCHARGE: 30 minutes

## 2024-10-16 NOTE — OP NOTE
PROCEDURE DATE: 10/16/2024    Procedure:   Interlaminar epidural steroid injection at T10-11 under fluoroscopic guidance.    Diagnosis: Thoracic radiculitis  pOSTOP DIAGNOSIS: sAME    Physician: Rory Moscoso M.D.    Medications injected:10 mg dexamethasone with 4 ml of preservative free NaCl    Local anesthetic injected:    Lidocaine 1% 2 ml total    Sedation Medications: RN IV sedation    Estimated blood loss:  None    Complications:  None    Technique:  Time-out taken to identify patient and procedure prior to starting the procedure.  With the patient laying in a prone position, the area was prepped and draped in the usual sterile fashion using ChloraPrep and a fenestrated drape.  After determining the target level with an AP fluoroscopic view, local anesthetic was given using a 25-gauge 1.5 inch needle by raising a wheal and then infiltrating toward the interlaminar entry space.  A 3.5inch 20-gauge Touhy needle was introduced under AP fluoroscopic guidance to the interlaminar space of T10-11. Once the trajectory was established, the needle was visualized in the lateral view and advanced using loss of resistance technique. Once in the desired position, 1ml contrast was injected to confirm placement and there was no vascular uptake nor intrathecal spread.  The medication was then injected slowly. The patient tolerated the procedure well.      The patient was monitored after the procedure.   They were given post-procedure and discharge instructions to follow at home.  The patient was discharged in a stable condition.

## 2024-10-16 NOTE — PROGRESS NOTES
HPI:  This is a very pleasant 79-year-old woman who presents for evaluation regarding an incidental MRI cervical finding.  She is currently being treated for thoracolumbar pain.  Recent MRI thoracic identified the intracanicular finding in the cervical region prompting MRI cervical with and without gadolinium.  She endorses waxing and waning chronic neck pain which is tolerable.  She denies shooting arm pain, upper extremity weakness, hand numbness, hand incoordination, dropping objects, imbalance, falls.  She denies bladder/bowel urgency or incontinence.  She reports aching stabbing pain in the thoracolumbar region.  The pain is worse with standing upright  Consequently she has recently begun using a single-point cane when ambulating.  She is scheduled for a T10-T11 LISA later today.    Smoking status:  Never  Past Medical History:   Diagnosis Date    Arthritis     Carpal tunnel syndrome 2/25/15    left; last injected 2/25/15    Chronic kidney disease, stage III (moderate) 11/14/2014    Hypertension       Past Surgical History:   Procedure Laterality Date    APPENDECTOMY      COLONOSCOPY  3/30/2010    Dr. Prince, ten year recheck    FEMUR FRACTURE SURGERY Left 5/21/15    IM rodding (L) distal femur    FRACTURE SURGERY      5- femer     HYSTERECTOMY      INJECTION OF ANESTHETIC AGENT AROUND MEDIAL BRANCH NERVES INNERVATING LUMBAR FACET JOINT Bilateral 3/5/2024    Procedure: Block-nerve-medial branch-lumbar;  Surgeon: Rory Moscoso MD;  Location: [facility];  Service: Anesthesiology;  Laterality: Bilateral;  L4/5 and l5/s1 MBB    INJECTION OF ANESTHETIC AGENT AROUND MEDIAL BRANCH NERVES INNERVATING LUMBAR FACET JOINT Bilateral 3/21/2024    Procedure: Block-nerve-medial branch-lumbar;  Surgeon: Rory Moscoso MD;  Location: [facility] OR;  Service: Anesthesiology;  Laterality: Bilateral;  L4/5 and l5/s1 MBB #2    JOINT REPLACEMENT Left 6/7/13    (L) TKA    OOPHORECTOMY      RADIOFREQUENCY ABLATION OF LUMBAR MEDIAL  BRANCH NERVE AT SINGLE LEVEL Bilateral 4/5/2024    Procedure: Radiofrequency Ablation, Nerve, Spinal, Lumbar, Medial Branch, 1 Level;  Surgeon: Rory Moscoso MD;  Location: [facility];  Service: Anesthesiology;  Laterality: Bilateral;  L4/5 and l5/s1 RFA   Burned at 80 degrees C. for 105 seconds each site    TONSILLECTOMY       Social History     Tobacco Use    Smoking status: Never    Smokeless tobacco: Never   Substance Use Topics    Alcohol use: No    Drug use: No       Review of Systems: All systems reviewed and are as above or otherwise negative.    General: well developed, well nourished, no distress.   Head: normocephalic, atraumatic  Eyes: pupils equal, round, reactive to light with accomodation, EOMI.   Neck: trachea midline. No JVD  Cardiovascular: No LE edema   Pulmonary: normal respirations, no signs of respiratory distress  Abdomen: soft, non-distended, not tender to palpation  Sensory: intact to light touch throughout  Extremities: No bruising, deformity  Skin: Skin is warm, dry and intact.    Motor Strength: Moves all extremities spontaneously with good tone.  Full strength upper and lower extremities. No abnormal movements seen.     Strength  Deltoids Triceps Biceps Wrist Extension Wrist Flexion Hand    Upper: R 5/5 5/5 5/5 5/5 5/5 5/5    L 5/5 5/5 5/5 5/5 5/5 5/5     Iliopsoas Quadriceps Knee  Flexion Tibialis  anterior Gastro- cnemius EHL   Lower: R 5/5 5/5 5/5 5/5 5/5 5/5    L 5/5 5/5 5/5 5/5 5/5 5/5     Neurologic: Alert and oriented. Thought content appropriate.  GCS: Motor: 6/Verbal: 5/Eyes: 4 GCS Total: 15  Mental Status: Awake, Alert, Oriented x 4  Language: No aphasia  Speech: No dysarthria  Cranial nerves: face symmetric, tongue midline, CN II-XII grossly intact.     Pronator Drift: no drift noted  Finger-to-nose: Intact bilaterally  DTR's: 2 + and symmetric in UE and LE  Rodarte: absent  Clonus: absent  Babinski: absent    Gait: normal    Tandem Gait: No difficulty           Able to  walk on heels & toes    Cervical Spine  ROM: full    Nontender to palpation    Lumbar Spine   ROM: full   Tender to palpation    Pain on Hip ROM: Negative  Straight leg raise: negative bilaterally     SI Joint tenderness: Negative bilaterally   ROGER: Negative bilaterally      Significant Exam Findings:  Motor and sensory intact.  Absent Filomena's.  No hyperreflexia.  Tandem walking normal.    Significant Radiology Findings:  I personally reviewed MRI cervical spine with and without contrast 10/10/2024.  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  Age-appropriate degenerative changes midcervical region.    irregularly-shaped soft tissue mass within the spinal canal primarily to the left of midline, extending from the superior endplate of C6 to the inferior endplate of C7. Based on axial images, this is favored to reflect an intradural extra medullary mass, measuring 3.0 x 1.4 x 1.1 cm in craniocaudal, transverse, and AP dimensions respectively. There is pronounced extrinsic mass effect upon the cervical spinal cord at this level, which is displaced to the right of midline. There is no evidence of osseous invasion or foraminal extension. Following contrast administration, there is diffuse homogeneous enhancement of the mass. No significant cord edema is appreciated. On contrast-enhanced axial images, there is suggestion of a small dural tail.     I also reviewed MRI thoracic and lumbar 10/2024.  Multilevel degenerative changes.  Multilevel degenerative spondylolisthesis lumbar.  Moderate canal stenosis T10-11.    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  Despite the cord mass effect she has no myelopathy signs or symptoms.  Risks outweigh benefits for surgical resection at this point.  I recommend close surveillance with repeat MRI cervical with and without gadolinium in 6  "months.  Continue symptom management relative to thoracolumbar pain as indicated.  We will see her back with the MRI cervical for review.  I advised her regarding myelopathy symptoms.  I counseled her to be promptly re-evaluated if she develops these symptoms.  She voiced understanding.  Multiple family members present today.    Kelly Watt" was seen today for back pain and neck pain.    Diagnoses and all orders for this visit:    Cervical stenosis of spinal canal    Intradural extramedullary spinal tumor  -     Ambulatory referral/consult to Neurosurgery        I spent a total of 45 minutes on the day of the visit.  This includes face to face time and non-face to face time preparing to see the patient (eg, review of tests), obtaining and/or reviewing separately obtained history, documenting clinical information in the electronic or other health record, independently interpreting results and communicating results to the patient/family/caregiver, or care coordinator.      "

## 2024-10-17 VITALS
OXYGEN SATURATION: 99 % | HEIGHT: 62 IN | TEMPERATURE: 98 F | SYSTOLIC BLOOD PRESSURE: 143 MMHG | HEART RATE: 72 BPM | RESPIRATION RATE: 18 BRPM | DIASTOLIC BLOOD PRESSURE: 63 MMHG | WEIGHT: 149.94 LBS | BODY MASS INDEX: 27.59 KG/M2

## 2024-11-18 ENCOUNTER — OFFICE VISIT (OUTPATIENT)
Dept: SPINE | Facility: CLINIC | Age: 79
End: 2024-11-18
Payer: MEDICARE

## 2024-11-18 VITALS — WEIGHT: 156.94 LBS | HEIGHT: 62 IN | BODY MASS INDEX: 28.88 KG/M2

## 2024-11-18 DIAGNOSIS — M54.6 PAIN IN THORACIC SPINE: Primary | ICD-10-CM

## 2024-11-18 PROCEDURE — 1101F PT FALLS ASSESS-DOCD LE1/YR: CPT | Mod: HCNC,CPTII,S$GLB, | Performed by: PHYSICAL MEDICINE & REHABILITATION

## 2024-11-18 PROCEDURE — 99213 OFFICE O/P EST LOW 20 MIN: CPT | Mod: HCNC,S$GLB,, | Performed by: PHYSICAL MEDICINE & REHABILITATION

## 2024-11-18 PROCEDURE — 3288F FALL RISK ASSESSMENT DOCD: CPT | Mod: HCNC,CPTII,S$GLB, | Performed by: PHYSICAL MEDICINE & REHABILITATION

## 2024-11-18 PROCEDURE — 1125F AMNT PAIN NOTED PAIN PRSNT: CPT | Mod: HCNC,CPTII,S$GLB, | Performed by: PHYSICAL MEDICINE & REHABILITATION

## 2024-11-18 PROCEDURE — 1159F MED LIST DOCD IN RCRD: CPT | Mod: HCNC,CPTII,S$GLB, | Performed by: PHYSICAL MEDICINE & REHABILITATION

## 2024-11-18 PROCEDURE — 1160F RVW MEDS BY RX/DR IN RCRD: CPT | Mod: HCNC,CPTII,S$GLB, | Performed by: PHYSICAL MEDICINE & REHABILITATION

## 2024-11-18 PROCEDURE — 99999 PR PBB SHADOW E&M-EST. PATIENT-LVL III: CPT | Mod: PBBFAC,HCNC,, | Performed by: PHYSICAL MEDICINE & REHABILITATION

## 2024-11-18 RX ORDER — DICLOFENAC SODIUM 10 MG/G
2 GEL TOPICAL DAILY
Qty: 100 G | Refills: 2 | Status: SHIPPED | OUTPATIENT
Start: 2024-11-18

## 2024-11-18 RX ORDER — TRAMADOL HYDROCHLORIDE 50 MG/1
50 TABLET ORAL DAILY PRN
Qty: 30 TABLET | Refills: 2 | Status: SHIPPED | OUTPATIENT
Start: 2024-11-18

## 2024-11-18 NOTE — PROGRESS NOTES
SUBJECTIVE:    Patient ID: Kelly Queen is a 79 y.o. female.    Chief Complaint: Follow-up    She is here for follow-up status post interlaminar injection T10-11 on 10/16/2024 with Dr. Moscoso.  A good response to that procedure.  She describes 50% improvement in her lower thoracic pain with improved functional mobility.  Her current pain level is 4/10 but at times as high as 6/10 and interferes with quality of life terms of activities of daily living recreation and social activities.  She had good pain relief with Voltaren gel and tramadol and would like refills.          Past Medical History:   Diagnosis Date    Arthritis     Carpal tunnel syndrome 2/25/15    left; last injected 2/25/15    Chronic kidney disease, stage III (moderate) 11/14/2014    Hypertension      Social History     Socioeconomic History    Marital status:    Tobacco Use    Smoking status: Never    Smokeless tobacco: Never   Substance and Sexual Activity    Alcohol use: No    Drug use: No     Past Surgical History:   Procedure Laterality Date    APPENDECTOMY      COLONOSCOPY  3/30/2010    Dr. Prince, ten year recheck    EPIDURAL STEROID INJECTION INTO THORACIC SPINE N/A 10/16/2024    Procedure: Injection-steroid-epidural-thoracic;  Surgeon: Rory Moscoso MD;  [facility];  Service: Pain Management;  Laterality: N/A;    FEMUR FRACTURE SURGERY Left 5/21/15    IM rodding (L) distal femur    FRACTURE SURGERY      5- femer     HYSTERECTOMY      INJECTION OF ANESTHETIC AGENT AROUND MEDIAL BRANCH NERVES INNERVATING LUMBAR FACET JOINT Bilateral 3/5/2024    Procedure: Block-nerve-medial branch-lumbar;  Surgeon: Rory Moscoso MD;  [facility];  Service: Anesthesiology;  Laterality: Bilateral;  L4/5 and l5/s1 MBB    INJECTION OF ANESTHETIC AGENT AROUND MEDIAL BRANCH NERVES INNERVATING LUMBAR FACET JOINT Bilateral 3/21/2024    Procedure: Block-nerve-medial branch-lumbar;  Surgeon: Rory Moscoso MD;  [facility];  Service:  "Anesthesiology;  Laterality: Bilateral;  L4/5 and l5/s1 MBB #2    JOINT REPLACEMENT Left 6/7/13    (L) TKA    OOPHORECTOMY      RADIOFREQUENCY ABLATION OF LUMBAR MEDIAL BRANCH NERVE AT SINGLE LEVEL Bilateral 4/5/2024    Procedure: Radiofrequency Ablation, Nerve, Spinal, Lumbar, Medial Branch, 1 Level;  Surgeon: Rory Moscoso MD;  Location: [facility] OR;  Service: Anesthesiology;  Laterality: Bilateral;  L4/5 and l5/s1 RFA   Burned at 80 degrees C. for 105 seconds each site    TONSILLECTOMY       Family History   Problem Relation Name Age of Onset    Diabetes Sister      Cancer Sister          colon    Hypertension Sister      Diabetes Brother      Cancer Brother          lung    Basal cell carcinoma Daughter luna     Skin cancer Daughter luna         squnamus     Hypertension Mother      No Known Problems Son      Diabetes Brother      Heart disease Brother       Vitals:    11/18/24 1434   Weight: 71.2 kg (156 lb 15.5 oz)   Height: 5' 2" (1.575 m)       Review of Systems   Constitutional:  Negative for chills, diaphoresis, fatigue, fever and unexpected weight change.   HENT:  Negative for trouble swallowing.    Eyes:  Negative for visual disturbance.   Respiratory:  Negative for shortness of breath.    Cardiovascular:  Negative for chest pain.   Gastrointestinal:  Negative for abdominal pain, constipation, nausea and vomiting.   Genitourinary:  Negative for difficulty urinating.   Musculoskeletal:  Negative for arthralgias, back pain, gait problem, joint swelling, myalgias, neck pain and neck stiffness.   Neurological:  Negative for dizziness, speech difficulty, weakness, light-headedness, numbness and headaches.          Objective:      Physical Exam  Neurological:      Mental Status: She is alert and oriented to person, place, and time.             Assessment:       1. Pain in thoracic spine           Plan:     Improved to her satisfaction status post interlaminar injection T10-11.  We are going to add some " physical therapy.  Refilled tramadol and Voltaren gel.  I have no problem prescribing tramadol long-term in this case.  Follow up here in 3 months or as needed      Pain in thoracic spine  -     Ambulatory referral/consult to Physical/Occupational Therapy; Future; Expected date: 11/25/2024  -     traMADoL (ULTRAM) 50 mg tablet; Take 1 tablet (50 mg total) by mouth daily as needed for Pain.  Dispense: 30 tablet; Refill: 2    Other orders  -     diclofenac sodium (VOLTAREN) 1 % Gel; Apply 2 g topically once daily.  Dispense: 100 g; Refill: 2

## 2024-12-17 ENCOUNTER — OFFICE VISIT (OUTPATIENT)
Dept: FAMILY MEDICINE | Facility: CLINIC | Age: 79
End: 2024-12-17
Attending: FAMILY MEDICINE
Payer: MEDICARE

## 2024-12-17 VITALS
HEART RATE: 80 BPM | HEIGHT: 62 IN | DIASTOLIC BLOOD PRESSURE: 60 MMHG | SYSTOLIC BLOOD PRESSURE: 116 MMHG | OXYGEN SATURATION: 96 % | WEIGHT: 158.75 LBS | BODY MASS INDEX: 29.21 KG/M2

## 2024-12-17 DIAGNOSIS — N18.31 STAGE 3A CHRONIC KIDNEY DISEASE: ICD-10-CM

## 2024-12-17 DIAGNOSIS — D32.9 MENINGIOMA: ICD-10-CM

## 2024-12-17 DIAGNOSIS — E78.5 DYSLIPIDEMIA: ICD-10-CM

## 2024-12-17 DIAGNOSIS — R73.03 PREDIABETES: ICD-10-CM

## 2024-12-17 DIAGNOSIS — I10 PRIMARY HYPERTENSION: Primary | ICD-10-CM

## 2024-12-17 PROCEDURE — 1101F PT FALLS ASSESS-DOCD LE1/YR: CPT | Mod: HCNC,CPTII,S$GLB, | Performed by: FAMILY MEDICINE

## 2024-12-17 PROCEDURE — 99214 OFFICE O/P EST MOD 30 MIN: CPT | Mod: HCNC,S$GLB,, | Performed by: FAMILY MEDICINE

## 2024-12-17 PROCEDURE — 3078F DIAST BP <80 MM HG: CPT | Mod: HCNC,CPTII,S$GLB, | Performed by: FAMILY MEDICINE

## 2024-12-17 PROCEDURE — 3074F SYST BP LT 130 MM HG: CPT | Mod: HCNC,CPTII,S$GLB, | Performed by: FAMILY MEDICINE

## 2024-12-17 PROCEDURE — 1160F RVW MEDS BY RX/DR IN RCRD: CPT | Mod: HCNC,CPTII,S$GLB, | Performed by: FAMILY MEDICINE

## 2024-12-17 PROCEDURE — 1125F AMNT PAIN NOTED PAIN PRSNT: CPT | Mod: HCNC,CPTII,S$GLB, | Performed by: FAMILY MEDICINE

## 2024-12-17 PROCEDURE — 1159F MED LIST DOCD IN RCRD: CPT | Mod: HCNC,CPTII,S$GLB, | Performed by: FAMILY MEDICINE

## 2024-12-17 PROCEDURE — 99999 PR PBB SHADOW E&M-EST. PATIENT-LVL III: CPT | Mod: PBBFAC,HCNC,, | Performed by: FAMILY MEDICINE

## 2024-12-17 PROCEDURE — 3288F FALL RISK ASSESSMENT DOCD: CPT | Mod: HCNC,CPTII,S$GLB, | Performed by: FAMILY MEDICINE

## 2024-12-17 RX ORDER — LISINOPRIL AND HYDROCHLOROTHIAZIDE 20; 25 MG/1; MG/1
1 TABLET ORAL DAILY
Qty: 90 TABLET | Refills: 3 | Status: SHIPPED | OUTPATIENT
Start: 2024-12-17 | End: 2025-12-17

## 2024-12-17 NOTE — PROGRESS NOTES
Subjective:       Patient ID: Kelly Queen is a 79 y.o. female.    Chief Complaint: Follow-up    79-year-old female coming in to follow-up on hypertension, hyperlipidemia, prediabetes and stage IIIA chronic kidney disease.  She has been seen recently in the spine and back Clinic for an acute midline low back pain and was found to have a T12 compression fracture losing about 30% of vertebral body height.  Her pain has responded to radiofrequency ablation done by Dr. Moscoso but incidental finding on workup revealed evidence of a meningioma at the C6-C7 level seen on an MRI of the thoracic spine.  Right now that is just being observed and appears to be asymptomatic.  She is taking lisinopril HCT 10/12.52 tablets daily and would like to combine them into a 2025 for simplicity sake.  She also takes Crestor for cholesterol in his on no medications for prediabetes as of yet.  Lab is up-to-date and she recently had a creatinine level done for the MRI scans that was satisfactory and stable.  Her next lab will be done in June.    Past Medical History:  No date: Arthritis  2/25/15: Carpal tunnel syndrome      Comment:  left; last injected 2/25/15  11/14/2014: Chronic kidney disease, stage III (moderate)  No date: Hypertension    Past Surgical History:  No date: APPENDECTOMY  3/30/2010: COLONOSCOPY      Comment:  Dr. Prince, ten year recheck  10/16/2024: EPIDURAL STEROID INJECTION INTO THORACIC SPINE; N/A      Comment:  Procedure: Injection-steroid-epidural-thoracic;                 Surgeon: Rory Moscoso MD;  Location: [facility];  Service:                Pain Management;  Laterality: N/A;  5/21/15: FEMUR FRACTURE SURGERY; Left      Comment:  IM rodding (L) distal femur  No date: FRACTURE SURGERY      Comment:  5- femer   No date: HYSTERECTOMY  3/5/2024: INJECTION OF ANESTHETIC AGENT AROUND MEDIAL BRANCH NERVES   INNERVATING LUMBAR FACET JOINT; Bilateral      Comment:  Procedure: Block-nerve-medial branch-lumbar;  Surgeon:                 Rory Moscoso MD;  Location: [facility];  Service:                Anesthesiology;  Laterality: Bilateral;  L4/5 and l5/s1                MBB  3/21/2024: INJECTION OF ANESTHETIC AGENT AROUND MEDIAL BRANCH NERVES   INNERVATING LUMBAR FACET JOINT; Bilateral      Comment:  Procedure: Block-nerve-medial branch-lumbar;  Surgeon:                Rory Moscoso MD;  Location: [facility];  Service:                Anesthesiology;  Laterality: Bilateral;  L4/5 and l5/s1                MBB #2  6/7/13: JOINT REPLACEMENT; Left      Comment:  (L) TKA  No date: OOPHORECTOMY  4/5/2024: RADIOFREQUENCY ABLATION OF LUMBAR MEDIAL BRANCH NERVE AT   SINGLE LEVEL; Bilateral      Comment:  Procedure: Radiofrequency Ablation, Nerve, Spinal,                Lumbar, Medial Branch, 1 Level;  Surgeon: Rory Moscoso MD;  [facility];  Service: Anesthesiology;                 Laterality: Bilateral;  L4/5 and l5/s1 RFA   Burned at 80               degrees C. for 105 seconds each site  No date: TONSILLECTOMY    Current Outpatient Medications on File Prior to Visit:  aspirin 81 mg Tab, Take 1 tablet by mouth once daily. , Disp: , Rfl:   biotin 1,000 mcg Chew, Take 1 tablet by mouth once daily., Disp: , Rfl:   clotrimazole-betamethasone 1-0.05% (LOTRISONE) cream, Apply topically 2 (two) times daily., Disp: 45 g, Rfl: 1  diclofenac sodium (VOLTAREN) 1 % Gel, Apply 2 g topically once daily., Disp: 100 g, Rfl: 2  fluticasone propionate (FLONASE) 50 mcg/actuation nasal spray, 1 spray (50 mcg total) by Each Nostril route once daily., Disp: 16 g, Rfl: 0  LIDOcaine (LIDODERM) 5 %, Place 1 patch onto the skin once daily. Remove & Discard patch within 12 hours. Do not reapply for 12 hours, Disp: 15 patch, Rfl: 0  multivitamin capsule, Take 1 capsule by mouth once daily., Disp: , Rfl:   rosuvastatin (CRESTOR) 20 MG tablet, Take 1 tablet (20 mg total) by mouth every evening., Disp: 90 tablet, Rfl: 2  tiZANidine (ZANAFLEX) 2 MG  tablet, Take 4 mg by mouth every 6 (six) hours as needed., Disp: , Rfl:   traMADoL (ULTRAM) 50 mg tablet, Take 1 tablet (50 mg total) by mouth daily as needed for Pain., Disp: 30 tablet, Rfl: 2  TURMERIC ORAL, Take 1 tablet by mouth once daily., Disp: , Rfl:   [DISCONTINUED] lisinopriL-hydrochlorothiazide (PRINZIDE,ZESTORETIC) 10-12.5 mg per tablet, Take 2 tablets by mouth once daily., Disp: , Rfl:   ondansetron (ZOFRAN-ODT) 4 MG TbDL, Take 1 tablet (4 mg total) by mouth every 8 (eight) hours as needed (nausea). (Patient not taking: Reported on 12/17/2024), Disp: 15 tablet, Rfl: 0    Current Facility-Administered Medications on File Prior to Visit:  lactated ringers infusion, , Intravenous, Continuous, Rory Moscoso MD, Stopped at 03/05/24 1400  lactated ringers infusion, , Intravenous, Continuous, Rory Moscoso MD, Last Rate: 25 mL/hr at 03/21/24 1247, 500 mL at 03/21/24 1247            Review of Systems   Constitutional:  Negative for chills and fever.   HENT:  Positive for postnasal drip and rhinorrhea. Negative for sneezing.    Respiratory:  Negative for cough, chest tightness and shortness of breath.    Cardiovascular:  Positive for leg swelling (Intermittent when standing in place for long periods of time, relieved by elevation and walking). Negative for chest pain and palpitations.   Neurological:  Negative for dizziness, light-headedness and headaches.       Objective:      Physical Exam  Vitals and nursing note reviewed.   Constitutional:       General: She is not in acute distress.     Appearance: Normal appearance. She is not ill-appearing, toxic-appearing or diaphoretic.      Comments: Good blood pressure control, borderline low on recheck but asymptomatic   Mildly overweight with a BMI of 29.0 weight is unchanged from her June 13, 2024 visit with me   Cardiovascular:      Rate and Rhythm: Normal rate and regular rhythm.      Heart sounds: Normal heart sounds.   Pulmonary:      Effort: Pulmonary effort is  normal.      Breath sounds: Normal breath sounds.   Musculoskeletal:      Right lower leg: No edema (Trace only).      Left lower leg: No edema (Trace only).   Neurological:      Mental Status: She is alert and oriented to person, place, and time. Mental status is at baseline.   Psychiatric:         Mood and Affect: Mood normal.         Behavior: Behavior normal.         Thought Content: Thought content normal.         Assessment:       1. Primary hypertension    2. Dyslipidemia    3. Stage 3a chronic kidney disease    4. Prediabetes    5. Meningioma        Plan:       1. Primary hypertension (Primary)  Well controlled, I have change the lisinopril HCT to a 20/25 taking one daily.  She will need recheck and lab in about six months  - lisinopriL-hydrochlorothiazide (PRINZIDE,ZESTORETIC) 20-25 mg Tab; Take 1 tablet by mouth once daily.  Dispense: 90 tablet; Refill: 3    2. Dyslipidemia  Lab Results   Component Value Date    CHOL 146 06/14/2024    CHOL 135 06/14/2023    CHOL 140 06/10/2022     Lab Results   Component Value Date    HDL 47 06/14/2024    HDL 62 06/14/2023    HDL 55 06/10/2022     Lab Results   Component Value Date    LDLCALC 78.6 06/14/2024    LDLCALC 57.0 (L) 06/14/2023    LDLCALC 65.4 06/10/2022     Lab Results   Component Value Date    TRIG 102 06/14/2024    TRIG 80 06/14/2023    TRIG 98 06/10/2022       Lab Results   Component Value Date    CHOLHDL 32.2 06/14/2024    CHOLHDL 45.9 06/14/2023    CHOLHDL 39.3 06/10/2022     Well controlled with no changes needed in the Crestor 20 mg, recheck due in six months    3. Stage 3a chronic kidney disease  BMP  Lab Results   Component Value Date     06/14/2024    K 4.2 06/14/2024     (H) 06/14/2024    CO2 25 06/14/2024    BUN 20 06/14/2024    CREATININE 0.8 06/14/2024    CALCIUM 9.6 06/14/2024    ANIONGAP 9 06/14/2024    EGFRNORACEVR >60.0 06/14/2024     Resolved to stage II or better, recent creatinine was stable at 1.0 in October.  Recheck in six  months    4. Prediabetes  Lab Results   Component Value Date    HGBA1C 6.3 (H) 06/14/2024     Stable recheck six months    5. Meningioma  Monitored by Neurosurgery in spine/back clinic

## 2025-02-03 ENCOUNTER — TELEPHONE (OUTPATIENT)
Dept: NEUROSURGERY | Facility: CLINIC | Age: 80
End: 2025-02-03
Payer: MEDICARE

## 2025-02-03 DIAGNOSIS — D49.7 INTRADURAL EXTRAMEDULLARY SPINAL TUMOR: Primary | ICD-10-CM

## 2025-02-03 NOTE — TELEPHONE ENCOUNTER
Called pt regarding scheduling her for an MRI C spine w/wo, as well as a f/u appt with Dr. Murray to review imaging. Unable to reach pt, left voicemail with callback number.

## 2025-02-03 NOTE — TELEPHONE ENCOUNTER
----- Message from FELIX Knight sent at 10/16/2024 10:34 AM CDT -----  Schedule 6 mon f/u w/ MRI cervical w/wo in April 2024.

## 2025-02-04 NOTE — TELEPHONE ENCOUNTER
[facility]  eMERGENCY dEPARTMENT eNCOUnter        Pt Name: Kristina Barbour  MRN: [id number]  [place] 1939  Date of evaluation: 11/1/2018  Provider: Audelia Alfonso MD  PCP: Rob Mckeon       Chief Complaint   Patient presents with   [facility] Fall     pt arrived via [employer] for fall at home. pt hit head on stove. uses cane. denies LOC. a/ox4. hx of old stroke. pt states she fell a few weeks ago. c/o of L sided rib pain, L eye pain, and head pain. Hypertensive 206/163. hx of high BP. Takes BP medications, did not take them today    Hypertension       HISTORY OFPRESENT ILLNESS   (Location/Symptom, Timing/Onset, Context/Setting, Quality, Duration, Modifying Factors,Severity)  Note limiting factors. Kristina Barbour is a 78 y.o. female       Location/Symptom: Fall  Timing/Onset: Today  Context/Setting: Patient lost her balance and fell. No Syncope. Similar episode 2 1/2 weeks ago. C/o pain along her head, neck and ribs on the left side. Quality: sharp  Duration: constant  Modifying Factors: touching the areas, specifically her left rib cage. Severity: 6/10    Also, patient did not take her BP medication today. Nursing Noteswere all reviewed and agreed with or any disagreements were addressed  in the HPI. REVIEW OF SYSTEMS    (2-9 systems for level 4, 10 or more for level 5)     Review of Systems   Constitutional: Negative for chills, fatigue and fever. HENT: Negative for ear pain, rhinorrhea and sore throat. Eyes: Negative for pain, discharge and visual disturbance. Respiratory: Negative for cough, shortness of breath and wheezing. Cardiovascular: Negative for chest pain, palpitations and leg swelling. Gastrointestinal: Negative for abdominal pain, diarrhea, nausea and vomiting. Genitourinary: Negative for difficulty urinating, dysuria, pelvic pain and vaginal discharge.    Musculoskeletal: Positive for gait Called pt, MRI was scheduled since last attempted call, successfully scheduled f/u appt with Dr. Murray. Confirmed appt details with pt.   performed without the administration of intravenous contrast. Multiplanar reformatted images are provided for review. Dose modulation, iterative reconstruction, and/or weight based adjustment of the mA/kV was utilized to reduce the radiation dose to as low as reasonably achievable. COMPARISON: None. HISTORY: ORDERING SYSTEM PROVIDED HISTORY: Fall, no contrast.  Just looking for rib fx's or PTX TECHNOLOGIST PROVIDED HISTORY: Ordering Physician Provided Reason for Exam: pt arrived via [employer] for fall at home. pt hit head on stove. uses cane. denies LOC. a/ox4. hx of old stroke. pt states she fell a few weeks ago. c/o of L sided rib pain, L eye pain, and head pain. Hypertensive 206/163. hx of high BP. Takes BP medications, did not take them today) Additional signs and symptoms: poss rib fx FINDINGS: Mediastinum: Borderline cardiomegaly. Extensive coronary artery calcifications and aortic calcifications evident. No significant mediastinal, hilar or axillary lymphadenopathy. The thyroid gland and esophagus grossly normal. Lungs/pleura: Moderate centrilobular emphysema. No pleural effusion or pneumothorax. No significant nodules or masses. There is evidence for secretions partially obstructing left lower lobe segmental bronchi proximally. Upper Abdomen: Partially imaged bilateral multiple renal cysts. Small bilateral lipid rich adrenal adenomas. Soft Tissues/Bones: Note that the ribs are not visualized in entirety on this exam.  Portions of the 8-12 ribs are not imaged there is nondisplaced anterior left 8th and probably 7th rib fractures. There is mild decreased height of T12 at the superior endplate which may reflect a compression fracture. Please correlate with point tenderness. No abnormal fluid collections in the superficial soft tissues. 1. Nondisplaced left 8th and probably 7th rib fracture noted anteriorly.  Note that the ribs are not visualized in the entirety on this exam. 2. Mild increase

## 2025-02-05 ENCOUNTER — LAB VISIT (OUTPATIENT)
Dept: LAB | Facility: HOSPITAL | Age: 80
End: 2025-02-05
Attending: NEUROLOGICAL SURGERY
Payer: MEDICARE

## 2025-02-05 DIAGNOSIS — D49.7 INTRADURAL EXTRAMEDULLARY SPINAL TUMOR: ICD-10-CM

## 2025-02-05 LAB
CREAT SERPL-MCNC: 0.8 MG/DL (ref 0.5–1.4)
EST. GFR  (NO RACE VARIABLE): >60 ML/MIN/1.73 M^2

## 2025-02-05 PROCEDURE — 36415 COLL VENOUS BLD VENIPUNCTURE: CPT | Mod: PO | Performed by: NEUROLOGICAL SURGERY

## 2025-02-05 PROCEDURE — 82565 ASSAY OF CREATININE: CPT | Performed by: NEUROLOGICAL SURGERY

## 2025-02-10 ENCOUNTER — HOSPITAL ENCOUNTER (OUTPATIENT)
Dept: RADIOLOGY | Facility: HOSPITAL | Age: 80
Discharge: HOME | End: 2025-02-10
Attending: NEUROLOGICAL SURGERY
Payer: MEDICARE

## 2025-02-10 DIAGNOSIS — D49.7 INTRADURAL EXTRAMEDULLARY SPINAL TUMOR: ICD-10-CM

## 2025-02-10 PROCEDURE — 72156 MRI NECK SPINE W/O & W/DYE: CPT | Mod: 26,,, | Performed by: RADIOLOGY

## 2025-02-10 PROCEDURE — 72156 MRI NECK SPINE W/O & W/DYE: CPT | Mod: TC,PO

## 2025-02-10 PROCEDURE — A9585 GADOBUTROL INJECTION: HCPCS | Mod: PO | Performed by: NEUROLOGICAL SURGERY

## 2025-02-10 PROCEDURE — 25500020 PHARM REV CODE 255: Mod: PO | Performed by: NEUROLOGICAL SURGERY

## 2025-02-10 RX ORDER — GADOBUTROL 604.72 MG/ML
7.5 INJECTION INTRAVENOUS
Status: COMPLETED | OUTPATIENT
Start: 2025-02-10 | End: 2025-02-10

## 2025-02-10 RX ADMIN — GADOBUTROL 7.5 ML: 604.72 INJECTION INTRAVENOUS at 02:02

## 2025-02-20 ENCOUNTER — OFFICE VISIT (OUTPATIENT)
Dept: NEUROSURGERY | Facility: CLINIC | Age: 80
End: 2025-02-20
Payer: MEDICARE

## 2025-02-20 VITALS — DIASTOLIC BLOOD PRESSURE: 70 MMHG | SYSTOLIC BLOOD PRESSURE: 156 MMHG | HEART RATE: 77 BPM

## 2025-02-20 DIAGNOSIS — D32.9 MENINGIOMA: Primary | ICD-10-CM

## 2025-02-20 PROCEDURE — 1159F MED LIST DOCD IN RCRD: CPT | Mod: CPTII,S$GLB,, | Performed by: HEALTH CARE PROVIDER

## 2025-02-20 PROCEDURE — 3077F SYST BP >= 140 MM HG: CPT | Mod: CPTII,S$GLB,, | Performed by: HEALTH CARE PROVIDER

## 2025-02-20 PROCEDURE — 1101F PT FALLS ASSESS-DOCD LE1/YR: CPT | Mod: CPTII,S$GLB,, | Performed by: HEALTH CARE PROVIDER

## 2025-02-20 PROCEDURE — 99213 OFFICE O/P EST LOW 20 MIN: CPT | Mod: S$GLB,,, | Performed by: HEALTH CARE PROVIDER

## 2025-02-20 PROCEDURE — 3288F FALL RISK ASSESSMENT DOCD: CPT | Mod: CPTII,S$GLB,, | Performed by: HEALTH CARE PROVIDER

## 2025-02-20 PROCEDURE — 3078F DIAST BP <80 MM HG: CPT | Mod: CPTII,S$GLB,, | Performed by: HEALTH CARE PROVIDER

## 2025-02-20 NOTE — PROGRESS NOTES
Neurosurgery  Established Patient    SUBJECTIVE:     History of Present Illness (10/16/2024):  This is a very pleasant 79-year-old woman who presents for evaluation regarding an incidental MRI cervical finding.  She is currently being treated for thoracolumbar pain.  Recent MRI thoracic identified the intracanicular finding in the cervical region prompting MRI cervical with and without gadolinium.  She endorses waxing and waning chronic neck pain which is tolerable.  She denies shooting arm pain, upper extremity weakness, hand numbness, hand incoordination, dropping objects, imbalance, falls.  She denies bladder/bowel urgency or incontinence.  She reports aching stabbing pain in the thoracolumbar region.  The pain is worse with standing upright  Consequently she has recently begun using a single-point cane when ambulating.  She is scheduled for a T10-T11 LISA later today.    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  Despite the cord mass effect she has no myelopathy signs or symptoms.  Risks outweigh benefits for surgical resection at this point.  I recommend close surveillance with repeat MRI cervical with and without gadolinium in 6 months.  Continue symptom management relative to thoracolumbar pain as indicated.  We will see her back with the MRI cervical for review.  I advised her regarding myelopathy symptoms.  I counseled her to be promptly re-evaluated if she develops these symptoms.  She voiced understanding.  Multiple family members present today.      Interval History (02/20/2025):  Kelly Queen is a 79 y.o. female presents with family for extramedullary meningioma follow-up.  Today, she denies falls, or imbalance.  She also denies upper extremity radicular pain, upper extremity weakness/paresthesias, or change in urinary/bowel function since last visit.      OBJECTIVE:     Vital Signs  Pulse: 77  BP: (!) 156/70  There is no height or  weight on file to calculate BMI.    Neurosurgery Physical Exam  General:  Very pleasant.  No acute distress.  Neurologic: Alert and oriented. Thought content appropriate.  GCS: E4 V5 M6; Total: 15  Mental Status: Awake, Alert, Oriented x 4  Pulmonary: normal respirations, no signs of respiratory distress  Skin: Skin is warm, dry and intact.    Sensory: intact to light touch throughout  Motor Strength: Moves all extremities.  No abnormal movements seen.   Bilateral upper extremity strength deltoid/biceps/triceps/hand  5/5   Bilateral lower extremity strength iliopsoas/TA/EHL/gastrocnemius 5/5     Rodarte's negative bilaterally.  Reflexes 2+ in upper and lower extremity.    Diagnostic Imaging:  I have personally reviewed MRI cervical spine WWO images obtained on 02/10/2025 with patient which revealed stable irregular shaped mass along cervical spinal canal with unchanged mass effect.  No acute findings noted.    ASSESSMENT/PLAN:     1. Meningioma      Today, patient denies myelopathic symptoms nor are myelopathic signs seen on exam.  She is currently asymptomatic in regards to cervical spine meningioma.  Recommend 6 month follow-up with cervical MRI WWO.        Meningioma         I spent a total of 19 minutes non-face and face to face time preparing to see the patient (eg, review of tests), obtaining and/or reviewing separately obtained history, documenting clinical information in the electronic or other health record, interpreting results and communicating results to the patient/family/caregiver, or care coordinator.      Reena Lipscomb PA-C  Neurosurgery  [facility]

## 2025-06-02 ENCOUNTER — LAB VISIT (OUTPATIENT)
Dept: LAB | Facility: HOSPITAL | Age: 80
End: 2025-06-02
Attending: FAMILY MEDICINE
Payer: MEDICARE

## 2025-06-02 ENCOUNTER — OFFICE VISIT (OUTPATIENT)
Dept: FAMILY MEDICINE | Facility: CLINIC | Age: 80
End: 2025-06-02
Payer: MEDICARE

## 2025-06-02 VITALS
HEIGHT: 62 IN | SYSTOLIC BLOOD PRESSURE: 132 MMHG | HEART RATE: 90 BPM | TEMPERATURE: 98 F | RESPIRATION RATE: 18 BRPM | OXYGEN SATURATION: 97 % | DIASTOLIC BLOOD PRESSURE: 66 MMHG | WEIGHT: 157.88 LBS | BODY MASS INDEX: 29.05 KG/M2

## 2025-06-02 DIAGNOSIS — G95.89 CERVICAL SPINAL MASS: ICD-10-CM

## 2025-06-02 DIAGNOSIS — E78.5 DYSLIPIDEMIA: Primary | ICD-10-CM

## 2025-06-02 DIAGNOSIS — R79.9 ABNORMAL BLOOD FINDING: ICD-10-CM

## 2025-06-02 DIAGNOSIS — R73.03 PREDIABETES: ICD-10-CM

## 2025-06-02 DIAGNOSIS — I10 PRIMARY HYPERTENSION: ICD-10-CM

## 2025-06-02 DIAGNOSIS — E78.5 DYSLIPIDEMIA: ICD-10-CM

## 2025-06-02 DIAGNOSIS — R13.10 DYSPHAGIA, UNSPECIFIED TYPE: ICD-10-CM

## 2025-06-02 LAB
ABSOLUTE EOSINOPHIL (OHS): 0.71 K/UL
ABSOLUTE MONOCYTE (OHS): 0.59 K/UL (ref 0.3–1)
ABSOLUTE NEUTROPHIL COUNT (OHS): 4.05 K/UL (ref 1.8–7.7)
ALBUMIN SERPL BCP-MCNC: 4.2 G/DL (ref 3.5–5.2)
ALP SERPL-CCNC: 45 UNIT/L (ref 40–150)
ALT SERPL W/O P-5'-P-CCNC: 10 UNIT/L (ref 10–44)
ANION GAP (OHS): 9 MMOL/L (ref 8–16)
AST SERPL-CCNC: 18 UNIT/L (ref 11–45)
BASOPHILS # BLD AUTO: 0.08 K/UL
BASOPHILS NFR BLD AUTO: 1 %
BILIRUB SERPL-MCNC: 0.5 MG/DL (ref 0.1–1)
BUN SERPL-MCNC: 24 MG/DL (ref 8–23)
CALCIUM SERPL-MCNC: 9.7 MG/DL (ref 8.7–10.5)
CHLORIDE SERPL-SCNC: 107 MMOL/L (ref 95–110)
CHOLEST SERPL-MCNC: 150 MG/DL (ref 120–199)
CHOLEST/HDLC SERPL: 2.9 {RATIO} (ref 2–5)
CO2 SERPL-SCNC: 26 MMOL/L (ref 23–29)
CREAT SERPL-MCNC: 1 MG/DL (ref 0.5–1.4)
EAG (OHS): 128 MG/DL (ref 68–131)
ERYTHROCYTE [DISTWIDTH] IN BLOOD BY AUTOMATED COUNT: 13.4 % (ref 11.5–14.5)
GFR SERPLBLD CREATININE-BSD FMLA CKD-EPI: 57 ML/MIN/1.73/M2
GLUCOSE SERPL-MCNC: 108 MG/DL (ref 70–110)
HBA1C MFR BLD: 6.1 % (ref 4–5.6)
HCT VFR BLD AUTO: 38.3 % (ref 37–48.5)
HDLC SERPL-MCNC: 51 MG/DL (ref 40–75)
HDLC SERPL: 34 % (ref 20–50)
HGB BLD-MCNC: 12.4 GM/DL (ref 12–16)
IMM GRANULOCYTES # BLD AUTO: 0.02 K/UL (ref 0–0.04)
IMM GRANULOCYTES NFR BLD AUTO: 0.3 % (ref 0–0.5)
LDLC SERPL CALC-MCNC: 66 MG/DL (ref 63–159)
LYMPHOCYTES # BLD AUTO: 2.44 K/UL (ref 1–4.8)
MCH RBC QN AUTO: 30.5 PG (ref 27–31)
MCHC RBC AUTO-ENTMCNC: 32.4 G/DL (ref 32–36)
MCV RBC AUTO: 94 FL (ref 82–98)
NONHDLC SERPL-MCNC: 99 MG/DL
NUCLEATED RBC (/100WBC) (OHS): 0 /100 WBC
PLATELET # BLD AUTO: 232 K/UL (ref 150–450)
PMV BLD AUTO: 11.9 FL (ref 9.2–12.9)
POTASSIUM SERPL-SCNC: 4.2 MMOL/L (ref 3.5–5.1)
PROT SERPL-MCNC: 7 GM/DL (ref 6–8.4)
RBC # BLD AUTO: 4.06 M/UL (ref 4–5.4)
RELATIVE EOSINOPHIL (OHS): 9 %
RELATIVE LYMPHOCYTE (OHS): 30.9 % (ref 18–48)
RELATIVE MONOCYTE (OHS): 7.5 % (ref 4–15)
RELATIVE NEUTROPHIL (OHS): 51.3 % (ref 38–73)
SODIUM SERPL-SCNC: 142 MMOL/L (ref 136–145)
TRIGL SERPL-MCNC: 165 MG/DL (ref 30–150)
WBC # BLD AUTO: 7.89 K/UL (ref 3.9–12.7)

## 2025-06-02 PROCEDURE — 3288F FALL RISK ASSESSMENT DOCD: CPT | Mod: CPTII,S$GLB,, | Performed by: FAMILY MEDICINE

## 2025-06-02 PROCEDURE — 99999 PR PBB SHADOW E&M-EST. PATIENT-LVL IV: CPT | Mod: PBBFAC,,, | Performed by: FAMILY MEDICINE

## 2025-06-02 PROCEDURE — 85025 COMPLETE CBC W/AUTO DIFF WBC: CPT

## 2025-06-02 PROCEDURE — 1101F PT FALLS ASSESS-DOCD LE1/YR: CPT | Mod: CPTII,S$GLB,, | Performed by: FAMILY MEDICINE

## 2025-06-02 PROCEDURE — 3075F SYST BP GE 130 - 139MM HG: CPT | Mod: CPTII,S$GLB,, | Performed by: FAMILY MEDICINE

## 2025-06-02 PROCEDURE — 3078F DIAST BP <80 MM HG: CPT | Mod: CPTII,S$GLB,, | Performed by: FAMILY MEDICINE

## 2025-06-02 PROCEDURE — 1159F MED LIST DOCD IN RCRD: CPT | Mod: CPTII,S$GLB,, | Performed by: FAMILY MEDICINE

## 2025-06-02 PROCEDURE — 80053 COMPREHEN METABOLIC PANEL: CPT

## 2025-06-02 PROCEDURE — 99214 OFFICE O/P EST MOD 30 MIN: CPT | Mod: S$GLB,,, | Performed by: FAMILY MEDICINE

## 2025-06-02 PROCEDURE — G2211 COMPLEX E/M VISIT ADD ON: HCPCS | Mod: S$GLB,,, | Performed by: FAMILY MEDICINE

## 2025-06-02 PROCEDURE — 36415 COLL VENOUS BLD VENIPUNCTURE: CPT | Mod: PO

## 2025-06-02 PROCEDURE — 80061 LIPID PANEL: CPT

## 2025-06-02 PROCEDURE — 1125F AMNT PAIN NOTED PAIN PRSNT: CPT | Mod: CPTII,S$GLB,, | Performed by: FAMILY MEDICINE

## 2025-06-02 PROCEDURE — 83036 HEMOGLOBIN GLYCOSYLATED A1C: CPT

## 2025-06-02 RX ORDER — LISINOPRIL AND HYDROCHLOROTHIAZIDE 20; 25 MG/1; MG/1
1 TABLET ORAL DAILY
Qty: 90 TABLET | Refills: 3 | Status: SHIPPED | OUTPATIENT
Start: 2025-06-02 | End: 2026-06-02

## 2025-06-02 RX ORDER — ROSUVASTATIN CALCIUM 20 MG/1
20 TABLET, COATED ORAL NIGHTLY
Qty: 90 TABLET | Refills: 3 | Status: SHIPPED | OUTPATIENT
Start: 2025-06-02

## 2025-06-03 ENCOUNTER — RESULTS FOLLOW-UP (OUTPATIENT)
Dept: FAMILY MEDICINE | Facility: CLINIC | Age: 80
End: 2025-06-03

## 2025-06-03 DIAGNOSIS — R13.10 DYSPHAGIA, UNSPECIFIED TYPE: Primary | ICD-10-CM

## 2025-07-01 ENCOUNTER — HOSPITAL ENCOUNTER (OUTPATIENT)
Dept: RADIOLOGY | Facility: HOSPITAL | Age: 80
Discharge: HOME | End: 2025-07-01
Attending: FAMILY MEDICINE
Payer: MEDICARE

## 2025-07-01 ENCOUNTER — CLINICAL SUPPORT (OUTPATIENT)
Dept: REHABILITATION | Facility: HOSPITAL | Age: 80
End: 2025-07-01
Attending: FAMILY MEDICINE
Payer: MEDICARE

## 2025-07-01 DIAGNOSIS — R13.19 ESOPHAGEAL DYSPHAGIA: Primary | ICD-10-CM

## 2025-07-01 DIAGNOSIS — R13.10 DYSPHAGIA, UNSPECIFIED TYPE: ICD-10-CM

## 2025-07-01 PROCEDURE — 25500020 PHARM REV CODE 255: Performed by: FAMILY MEDICINE

## 2025-07-01 PROCEDURE — 92611 MOTION FLUOROSCOPY/SWALLOW: CPT | Mod: PO

## 2025-07-01 PROCEDURE — 92610 EVALUATE SWALLOWING FUNCTION: CPT | Mod: PO

## 2025-07-01 PROCEDURE — A9698 NON-RAD CONTRAST MATERIALNOC: HCPCS | Performed by: FAMILY MEDICINE

## 2025-07-01 PROCEDURE — 74230 X-RAY XM SWLNG FUNCJ C+: CPT | Mod: TC

## 2025-07-01 RX ADMIN — BARIUM SULFATE 90 ML: 0.81 POWDER, FOR SUSPENSION ORAL at 01:07

## 2025-07-03 NOTE — PROGRESS NOTES
"  Outpatient Rehab  Modified Barium Swallow Study     Patient Name: Karen Queen  MRN: [id number]  YOB: 1945  Encounter Date: 7/1/2025    Therapy Diagnosis:   Encounter Diagnosis   Name Primary?    Esophageal dysphagia Yes     Physician: Destin Beal MD    Physician Orders: Modified Barium Swallow Study   Medical Diagnosis: Dysphagia, unspecified type  Surgical Diagnosis: Not applicable for this Episode   Surgical Date: Not applicable for this Episode  Days Since Last Surgery: Not applicable for this Episode    Visit # / Visits Authorized: 2 / 1   Insurance Authorization Period: 6/16/2025 to 10/31/2025  Date of Evaluation: 7/1/2025      Time In: 1300   Time Out: 1330  Total Time (in minutes): 30   Total Billable Time (in minutes): 30      Precautions:  General precautions include: Aspiration/choking.      Standard, fall    Subjective   History of Present Illness  Karen is a 80 y.o. female who reports to Speech-Language Pathology with a chief concern of dysphagia.     The patient reports a medical diagnosis of Dysphagia, unspecified type (R13.10).           Patient is a pleasant 81 y/o female with a PMH remarkable for cervical spinal mass, HTN, dyslipidemia, meningioma, prediabetes, and arthritis who presents for outpatient Modified Barium Swallow Study to instrumentally assess oropharyngeal swallow physiology due to complaints of dysphagia. Patient endorses solid food dysphagia, globus sensation, and occasionally "choking on my saliva". She denied any unintentional weight loss and reported good tolerance of liquids.        Patient presents for study breathing on Room air.       MRI cervical spine reported, "1. Irregular shaped enhancing mass in the left spinal canal again demonstrated showing no detrimental change and currently measuring approximately 2.9 x 1.6 x 1.0 cm.  Mass effect on the spinal cord is unchanged from previous exam.  No new intrathecal abnormalities. 2. Multilevel discogenic, " "facet and uncovertebral degenerative changes are unchanged from previous exam."      Current Swallow Symptoms and Diet  Associated Swallowing Symptoms: Difficulty swallowing food, Food gets stuck, Globus sensation  Current Mode of Intake: Oral diet  Oral Intake Details: Primary source of nutrition  Current Drink Consistency: Thin (IDDSI 0)  Current Food Consistency: Regular (IDDSI 7)  Ability to Self-Feed: Independent         Pain  Patient reports a current pain level of 10/10.     Location: back (chronic)  Patient verbalized 10/10 pain when ambulating to fluoro suite but did not complain of pain during study      Treatment History  No: Previously Received Treatments   No: Currently Receiving Treatments          Past Medical History/Physical Systems Review:   Kelly Queen  has a past medical history of Arthritis, Carpal tunnel syndrome, Chronic kidney disease, stage III (moderate), and Hypertension.    Kelly Queen  has a past surgical history that includes Appendectomy; Hysterectomy; Tonsillectomy; Colonoscopy (3/30/2010); Joint replacement (Left, 6/7/13); Femur fracture surgery (Left, 5/21/15); Fracture surgery; Oophorectomy; Injection of anesthetic agent around medial branch nerves innervating lumbar facet joint (Bilateral, 3/5/2024); Injection of anesthetic agent around medial branch nerves innervating lumbar facet joint (Bilateral, 3/21/2024); Radiofrequency ablation of lumbar medial branch nerve at single level (Bilateral, 4/5/2024); and Epidural steroid injection into thoracic spine (N/A, 10/16/2024).    Kelly has a current medication list which includes the following prescription(s): aspirin, biotin, clotrimazole-betamethasone 1-0.05%, diclofenac sodium, fluticasone propionate, lidocaine, lisinopril-hydrochlorothiazide, multivitamin, ondansetron, rosuvastatin, tizanidine, tramadol, and turmeric, and the following Facility-Administered Medications: lactated ringers and lactated ringers.    Review " of patient's allergies indicates:   Allergen Reactions    Codeine      Other reaction(s): pt does not want to take    Mobic [meloxicam] Nausea And Vomiting          Objective   Oral Peripheral Exam  Dentition and Oral Hygiene  The patient's current dental condition: Adequate.   Oral hygiene is Good.       Buccal and Oral Mucosa Exam  Buccal sensation-CN V-is Normal. Buccal strength-CN VII-is Normal. Oral mucosa observations: Normal       Labial Exam  Labial Symmetry is Normal.  Range of motion-CN VII-is Normal. Alternating labial protrusion/retraction is Intact. Strength-CN VII-is Normal. Tone is Normal. Coordination is Intact. Sensation-CN V-is Normal.   Seal is Normal.       Lingual Exam  Range of motion-CN XII-is Normal. Strength-CN XII-is Normal. Symmetry-CN XII-is Normal. Tone is Normal. Speed is Intact. Coordination is Intact. Sensation-CN V & IX-is Normal. Taste-CN VII, IX, & X-is Normal.        Velar and Uvular Exam  Velar elevation during phonation-CN X-is Normal. Sustained velar elevation is Intact.   Velum at rest is Intact. Velar tone is Normal.          Jaw Exam  Range of motion-CN V-is Normal.   Strength-CN V-is Normal. Tone is Normal. Sensation-CN V-is Intact.         Face and Neck Exam  Facial symmetry-CN VII-is Normal.    Facial sensation-CN V-is Normal.              Videofluroscopic Swallow Study  VFSS Exam Details  Reason for VFSS: Objectively assess swallow anatomy and physiology    Barium Type: Varibar thin, Varibar mildly thick/nectar, Varibar pudding    Video Speed: 30 fps  Patient Position During VFSS: Upright in chair   Views Taken: Anterior-posterior, Lateral  Trials presented by: Self     Drink Consistencies Presented: Thin (IDDSI 0), Mildly thick (IDDSI 2)   Food Consistencies Presented: Pureed (IDDSI 4), Soft and bite-sized (IDDSI 6), Regular (IDDSI 7)      Consistency Trials  Thin, IDDSI 0 - Presented tsp x2, self-regulated cup sip, self-regulated straw, rapid consecutive straw      Mildly Thick, IDDSI 2 - Presented tsp x2     Extremely Thick/Pureed, IDDSI 4 - Presented 1/2 teaspoon, full teaspoon x2     Soft and Bite-Sized, IDDSI 6 - Presented two peaches in juice     Regular, IDDSI 7 - Presented self-regulated bite       Oral Phase  Lip Closure: Full  Bolus Loss: No loss  Lingual Control: No loss  Mastication: Prompt and efficient  Oral Residue: None    Pharyngeal Phase  Bolus Head Location When Swallow Initiation Occurs: Base of tongue  Soft Palate Elevation: Adequate  Tongue Base Retraction: Complete  Tongue Base Retraction - Air/Contrast Column: None  Laryngeal Elevation: Complete  Hyoid Movement: Complete   Laryngeal Vestibular Closure: Complete  Epiglottic Inversion: Present  Pharyngeal Constriction/Stripping Wave: Present  Pharyngeal Contraction: Complete  Pharyngeal Residue: None    Rosenbek's Penetration-Aspiration Scale  Thin, IDDSI 0: 1-Material does not enter the airway.  Mildly Thick, IDDSI 2: 1-Material does not enter the airway.  Extremely Thick/Pureed, IDDSI 4: 1-Material does not enter the airway.  Soft and Bite-Sized, IDDSI 6: 1-Material does not enter the airway.  Regular, IDDSI 7: 1-Material does not enter the airway.     Esophageal Sweep: retrograde flow of bolus below the UES observed, delayed emptying/retention of bolus, and cricopharyngeal bar present at the level of C6/C7 with impact to bolus flow (though nonobstructive)         Time Entry(in minutes):  Clinical Swallow Eval Time Entry: 10  Motion Fluoro Swallow Eval by cine/video Time Entry: 20    Assessment & Plan   Assessment   Karen presents with symptoms that are Stable.     Karen is an 80 year old female referred for Modified Barium Swallow Study with a medical diagnosis of Dysphagia, unspecified type R13.10. The patient presents with WFL oropharyngeal dysphagia as determined by the Dysphagia Outcome and Severity Scale (MAU). Level 7: Normal in all situations.     Modified Barium Swallow Study (MBSS) revealed oral  phase characterized by adequate lingual and labial strength and range of motion for tongue control, bolus preparation and transport. Lip closure was adequate with no labial escape. Bolus prep and mastication was timely and efficient. Lingual motion was brisk for adequate bolus transport. There was no significant oral residue. The swallow was initiated when the head of the bolus passed the ramus of the mandible.    Pharyngeal phase characterized by timely initiation of swallow across consistencies. The soft palate elevated for complete of the velopharyngeal port. During pharyngeal swallow, adequate base of tongue retraction, anterior hyoid excursion, laryngeal elevation, and pharyngeal stripping wave resulted in complete epiglottic inversion and UES opening with no penetration, aspiration, or significant pharyngeal residue was observed in today's study. Suspected CP bar noted with impact to bolus flow (though nonobstructive).     On esophageal screen, dysmotility and retrograde flow below the pharyngo-esophageal segment  were noted. Further esophageal imaging including EGD and/or barium esophagram as well as follow-up with GI is recommended.    Impressions: Patient presents with WFL oral and pharyngeal phases of the swallow. Suspect etiology of dysphagia complaints is more related to underlying esophageal dysphagia given findings on esophageal sweep. In consideration of the Dynamic Imaging Grade of Swallowing Toxicity (DIGEST) (Sole et al, 2017), patient presents with preserved safety of swallow and preserved efficiency of swallow. Patient appears to be at low risk for aspiration related PNA in consideration of three pillars of aspiration pneumonia ([place], 2005) including oral health status, overall health/immune status, and laryngeal vestibule closure/severity of dysphagia. However, unable to assess risk related to aspiration pneumonia cause by the aspiration of gastric content. There is no indication for  behavioral swallow rehabilitation at this time.     Functional Oral Intake Scale (FOIS)  The Functional Oral Intake Scale (FOIS) is an ordinal scale that is used to assess the current status and meaningful change in the oral intake. FOIS levels include:    TUBE DEPENDENT (levels 1-3) 1. No oral intake  2. Tube dependent with minimal/inconsistent oral intake  3. Tube supplements with consistent oral intake      TOTAL ORAL INTAKE (levels 4-7) 4. Total oral intake of a single consistency  5. Total oral intake of multiple consistencies requiring special preparation  6. Total oral intake with no special preparation, but must avoid specific foods or liquid items  7. Total oral intake with no restrictions     Patient is currently judged to be at FOIS level 7.      References:  APRIL Godoy. (2005, March). Pneumonia: Factors Beyond Aspiration. Perspectives in Swallowing and Swallowing Disorders (Dysphagia), 14, 10-16.  Zoë KA, Blayne MP, Dhara DA, Adria GODDARDK, [place] HY, Adria RS, Reta CD, Cam SY, Flaco CP, Norma J, Lazarus CL, May A, Franklin J, Taras JW, Arnoldo HM, Akhil JS. Dynamic Imaging Grade of Swallowing Toxicity (DIGEST): Scale development and validation. Cancer. 2017 Jan 1;123(1):62-70. doi: 10.1002/cncr.98593. Epub 2016 Aug 26. PMID: 20136856; PMCID: [id number].    Recommendations:     Consistency Recommendations: Thin liquids (IDDSI 0) and Regular consistencies (IDDSI 7).   Risk Management: use good oral hygiene , sit upright for all PO intake, increase physical mobility as tolerated, and behavioral reflux precautions  Specialist Referrals: Gastroenterologist (GI) given findings on esophageal sweep  Therapy: Dysphagia therapy is not recommended at this time.  Follow-up exam: Follow up swallow study is not indicated at this time.    Please contact [facility] at [phone] if there are questions re: the above or if we can be of additional service to this patient.            Referral Recommendations : Gastroenterology      Education  Education was done with Patient and Other recipient present. The patient's learning style includes Listening. The patient Verbalizes understanding.  They identified as Other (Comment). The reported learning style is Listening. The recipient Verbalizes understanding.        Plan  From a speech language pathology perspective, the patient would not benefit from: Skilled Rehab Services            The patient's spiritual, cultural, and educational needs were considered, and the patient is agreeable to the plan of care and goals.         JUAN Wolf-SLP, CCC-SLP

## 2025-07-07 ENCOUNTER — TELEPHONE (OUTPATIENT)
Dept: FAMILY MEDICINE | Facility: CLINIC | Age: 80
End: 2025-07-07
Payer: MEDICARE

## 2025-07-07 NOTE — TELEPHONE ENCOUNTER
Copied from CRM #[id number]. Topic: General Inquiry - Return Call  >> Jul 7, 2025  2:31 PM Lisseth wrote:  Type:  Patient Returning Call    Who Called:  Patient  Who Left Message for Patient:  Nola  Does the patient know what this is regarding?:  results  Best Call Back Number:  [phone]  Additional Information:  Thank You

## 2025-07-09 NOTE — PROGRESS NOTES
"Subjective:       Patient ID: Kelly Queen is a 80 y.o. female Body mass index is 28.15 kg/m².    Chief Complaint: Dysphagia    This patient is new to me.  Referring Provider: Dr. Destin Beal for dysphagia.     Pt reports trouble swallowing liquids. She reports one time she had a "choking" episode not associated with food intake.     MBSS 7/3/205  Assessment   Kaern presents with symptoms that are Stable.     Karen is an 80 year old female referred for Modified Barium Swallow Study with a medical diagnosis of Dysphagia, unspecified type R13.10. The patient presents with WFL oropharyngeal dysphagia as determined by the Dysphagia Outcome and Severity Scale (MAU). Level 7: Normal in all situations.      Modified Barium Swallow Study (MBSS) revealed oral phase characterized by adequate lingual and labial strength and range of motion for tongue control, bolus preparation and transport. Lip closure was adequate with no labial escape. Bolus prep and mastication was timely and efficient. Lingual motion was brisk for adequate bolus transport. There was no significant oral residue. The swallow was initiated when the head of the bolus passed the ramus of the mandible.     Pharyngeal phase characterized by timely initiation of swallow across consistencies. The soft palate elevated for complete of the velopharyngeal port. During pharyngeal swallow, adequate base of tongue retraction, anterior hyoid excursion, laryngeal elevation, and pharyngeal stripping wave resulted in complete epiglottic inversion and UES opening with no penetration, aspiration, or significant pharyngeal residue was observed in today's study. Suspected CP bar noted with impact to bolus flow (though nonobstructive).      On esophageal screen, dysmotility and retrograde flow below the pharyngo-esophageal segment  were noted. Further esophageal imaging including EGD and/or barium esophagram as well as follow-up with GI is recommended.     Impressions: " Patient presents with WFL oral and pharyngeal phases of the swallow. Suspect etiology of dysphagia complaints is more related to underlying esophageal dysphagia given findings on esophageal sweep. In consideration of the Dynamic Imaging Grade of Swallowing Toxicity (DIGEST) (Sole et al, 2017), patient presents with preserved safety of swallow and preserved efficiency of swallow. Patient appears to be at low risk for aspiration related PNA in consideration of three pillars of aspiration pneumonia (Jayne, 2005) including oral health status, overall health/immune status, and laryngeal vestibule closure/severity of dysphagia. However, unable to assess risk related to aspiration pneumonia cause by the aspiration of gastric content. There is no indication for behavioral swallow rehabilitation at this time.     · Consistency Recommendations: Thin liquids (IDDSI 0) and Regular consistencies (IDDSI 7).   · Risk Management: use good oral hygiene , sit upright for all PO intake, increase physical mobility as tolerated, and behavioral reflux precautions  · Specialist Referrals: Gastroenterologist (GI) given findings on esophageal sweep  · Therapy: Dysphagia therapy is not recommended at this time.  · Follow-up exam: Follow up swallow study is not indicated at this time.     Please contact [facility] at [phone] if there are questions re: the above or if we can be of additional service to this patient.           Referral Recommendations : Gastroenterology       Education  Education was done with Patient and Other recipient present. The patient's learning style includes Listening. The patient Verbalizes understanding.  They identified as Other (Comment). The reported learning style is Listening. The recipient Verbalizes understanding.         Plan  From a speech language pathology perspective, the patient would not benefit from: Skilled Rehab Services      The patient's spiritual,  cultural, and educational needs were considered, and the patient is agreeable to the plan of care and goals.         Review of Systems   Constitutional:  Negative for activity change, appetite change, fatigue, fever and unexpected weight change.   HENT:  Positive for trouble swallowing. Negative for sore throat.    Respiratory:  Negative for cough and shortness of breath.    Cardiovascular:  Negative for chest pain.   Gastrointestinal:  Negative for abdominal distention, abdominal pain, anal bleeding, blood in stool, constipation, diarrhea, nausea, rectal pain and vomiting.       No LMP recorded. Patient has had a hysterectomy.  Past Medical History:   Diagnosis Date    Arthritis     Carpal tunnel syndrome 2/25/15    left; last injected 2/25/15    Chronic kidney disease, stage III (moderate) 11/14/2014    Hypertension      Past Surgical History:   Procedure Laterality Date    APPENDECTOMY      COLONOSCOPY  3/30/2010    Dr. Prince, ten year recheck    EPIDURAL STEROID INJECTION INTO THORACIC SPINE N/A 10/16/2024    Procedure: Injection-steroid-epidural-thoracic;  Surgeon: Rory Moscoso MD;  Location: [facility] OR;  Service: Pain Management;  Laterality: N/A;    FEMUR FRACTURE SURGERY Left 5/21/15    IM rodding (L) distal femur    FRACTURE SURGERY      5- femer     HYSTERECTOMY      INJECTION OF ANESTHETIC AGENT AROUND MEDIAL BRANCH NERVES INNERVATING LUMBAR FACET JOINT Bilateral 3/5/2024    Procedure: Block-nerve-medial branch-lumbar;  Surgeon: Rory Moscoso MD;  [facility];  Service: Anesthesiology;  Laterality: Bilateral;  L4/5 and l5/s1 MBB    INJECTION OF ANESTHETIC AGENT AROUND MEDIAL BRANCH NERVES INNERVATING LUMBAR FACET JOINT Bilateral 3/21/2024    Procedure: Block-nerve-medial branch-lumbar;  Surgeon: Rory Moscoso MD;  [facility];  Service: Anesthesiology;  Laterality: Bilateral;  L4/5 and l5/s1 MBB #2    JOINT REPLACEMENT Left 6/7/13    (L) TKA    OOPHORECTOMY      RADIOFREQUENCY  ABLATION OF LUMBAR MEDIAL BRANCH NERVE AT SINGLE LEVEL Bilateral 4/5/2024    Procedure: Radiofrequency Ablation, Nerve, Spinal, Lumbar, Medial Branch, 1 Level;  Surgeon: Rory Moscoso MD;  Location: [facility];  Service: Anesthesiology;  Laterality: Bilateral;  L4/5 and l5/s1 RFA   Burned at 80 degrees C. for 105 seconds each site    TONSILLECTOMY       Family History   Problem Relation Name Age of Onset    Diabetes Sister      Cancer Sister          colon    Hypertension Sister      Diabetes Brother      Cancer Brother          lung    Basal cell carcinoma Daughter luna     Skin cancer Daughter luna         squnamus     Hypertension Mother      No Known Problems Son      Diabetes Brother      Heart disease Brother       Social History[1]  Wt Readings from Last 10 Encounters:   07/14/25 69.8 kg (153 lb 14.1 oz)   06/02/25 71.6 kg (157 lb 13.6 oz)   12/17/24 72 kg (158 lb 11.7 oz)   11/18/24 71.2 kg (156 lb 15.5 oz)   10/11/24 68 kg (149 lb 14.6 oz)   10/16/24 71.2 kg (156 lb 13.7 oz)   10/15/24 68 kg (149 lb 14.6 oz)   10/03/24 68 kg (149 lb 14.6 oz)   09/18/24 68 kg (149 lb 14.6 oz)   09/16/24 68 kg (150 lb)     Lab Results   Component Value Date    WBC 7.89 06/02/2025    HGB 12.4 06/02/2025    HCT 38.3 06/02/2025    MCV 94 06/02/2025     06/02/2025     CMP  Sodium   Date Value Ref Range Status   06/02/2025 142 136 - 145 mmol/L Final   06/14/2024 145 136 - 145 mmol/L Final     Potassium   Date Value Ref Range Status   06/02/2025 4.2 3.5 - 5.1 mmol/L Final   06/14/2024 4.2 3.5 - 5.1 mmol/L Final     Chloride   Date Value Ref Range Status   06/02/2025 107 95 - 110 mmol/L Final   06/14/2024 111 (H) 95 - 110 mmol/L Final     CO2   Date Value Ref Range Status   06/02/2025 26 23 - 29 mmol/L Final   06/14/2024 25 23 - 29 mmol/L Final     Glucose   Date Value Ref Range Status   06/02/2025 108 70 - 110 mg/dL Final   06/14/2024 120 (H) 70 - 110 mg/dL Final     BUN   Date Value Ref Range Status   06/02/2025 24  (H) 8 - 23 mg/dL Final     Creatinine   Date Value Ref Range Status   06/02/2025 1.0 0.5 - 1.4 mg/dL Final     Calcium   Date Value Ref Range Status   06/02/2025 9.7 8.7 - 10.5 mg/dL Final   06/14/2024 9.6 8.7 - 10.5 mg/dL Final     Protein Total   Date Value Ref Range Status   06/02/2025 7.0 6.0 - 8.4 gm/dL Final     Total Protein   Date Value Ref Range Status   06/14/2024 6.5 6.0 - 8.4 g/dL Final     Albumin   Date Value Ref Range Status   06/02/2025 4.2 3.5 - 5.2 g/dL Final   06/14/2024 3.9 3.5 - 5.2 g/dL Final     Total Bilirubin   Date Value Ref Range Status   06/14/2024 0.4 0.1 - 1.0 mg/dL Final     Comment:     For infants and newborns, interpretation of results should be based  on gestational age, weight and in agreement with clinical  observations.    Premature Infant recommended reference ranges:  Up to 24 hours.............<8.0 mg/dL  Up to 48 hours............<12.0 mg/dL  3-5 days..................<15.0 mg/dL  6-29 days.................<15.0 mg/dL       Bilirubin Total   Date Value Ref Range Status   06/02/2025 0.5 0.1 - 1.0 mg/dL Final     Comment:     For infants and newborns, interpretation of results should be based   on gestational age, weight and in agreement with clinical   observations.    Premature Infant recommended reference ranges:   0-24 hours:  <8.0 mg/dL   24-48 hours: <12.0 mg/dL   3-5 days:    <15.0 mg/dL   6-29 days:   <15.0 mg/dL     Alkaline Phosphatase   Date Value Ref Range Status   06/14/2024 52 (L) 55 - 135 U/L Final     ALP   Date Value Ref Range Status   06/02/2025 45 40 - 150 unit/L Final     AST   Date Value Ref Range Status   06/02/2025 18 11 - 45 unit/L Final   06/14/2024 18 10 - 40 U/L Final     ALT   Date Value Ref Range Status   06/02/2025 10 10 - 44 unit/L Final   06/14/2024 12 10 - 44 U/L Final     Anion Gap   Date Value Ref Range Status   06/02/2025 9 8 - 16 mmol/L Final     eGFR if    Date Value Ref Range Status   06/10/2022 >60.0 >60 mL/min/1.73 m^2  "Final     eGFR if non    Date Value Ref Range Status   06/10/2022 >60.0 >60 mL/min/1.73 m^2 Final     Comment:     Calculation used to obtain the estimated glomerular filtration  rate (eGFR) is the CKD-EPI equation.        No results found for: "AMYLASE"  No results found for: "LIPASE"  No results found for: "LIPASERES"  Lab Results   Component Value Date    TSH 0.655 04/25/2019       Review[facility] prior medical [facility] & endoscopy history (see surgical history).    Objective:      Physical Exam  Vitals and nursing note reviewed.   Constitutional:       General: She is not in acute distress.     Appearance: She is not ill-appearing.   HENT:      Head: Normocephalic and atraumatic.      Mouth/Throat:      Mouth: Mucous membranes are moist.      Pharynx: Oropharynx is clear.   Eyes:      Conjunctiva/sclera: Conjunctivae normal.   Cardiovascular:      Rate and Rhythm: Normal rate and regular rhythm.      Pulses: Normal pulses.   Pulmonary:      Effort: Pulmonary effort is normal. No respiratory distress.   Abdominal:      General: Abdomen is flat. Bowel sounds are normal. There is no distension.      Palpations: Abdomen is soft.      Tenderness: There is no abdominal tenderness.   Musculoskeletal:      Comments: Walks with rollator   Skin:     General: Skin is warm and dry.      Capillary Refill: Capillary refill takes 2 to 3 seconds.   Neurological:      Mental Status: She is alert and oriented to person, place, and time.         Assessment:       1. Dysphagia, unspecified type        Plan:       Dysphagia, unspecified type  - schedule EGD, discussed procedure with patient and possible esophageal dilation may be performed during procedure if indicated, patient verbalized understanding  - educated patient to eat smaller more frequent meals and to eat slowly and advised to eat a soft diet.  - possible UGI/esophagram/esophageal manometry if symptoms persist     Pt to " call back to schedule EGD d/t ride having to assist with date chosen.  -     Ambulatory referral/consult to Gastroenterology  -     pantoprazole (PROTONIX) 40 MG tablet; Take 1 tablet (40 mg total) by mouth once daily.  Dispense: 90 tablet; Refill: 3      Follow up if symptoms worsen or fail to improve.      If no improvement in symptoms or symptoms worsen, call/follow-up at clinic or go to ER.       HUNG Yepez, [id number]    Encounter includes face to face time and non-face to face time preparing to see the patient (eg, review of tests), obtaining and/or reviewing separately obtained history, documenting clinical information in the electronic or other health record, independently interpreting results (not separately reported) and communicating results to the patient/family/caregiver, or care coordination (not separately reported).     A dictation software program was used for this note. Please expect some simple typographical errors in this note.         [1]   Social History  Tobacco Use    Smoking status: Never    Smokeless tobacco: Never   Substance Use Topics    Alcohol use: No    Drug use: No

## 2025-07-14 ENCOUNTER — OFFICE VISIT (OUTPATIENT)
Dept: GASTROENTEROLOGY | Facility: CLINIC | Age: 80
End: 2025-07-14
Payer: MEDICARE

## 2025-07-14 VITALS — BODY MASS INDEX: 28.32 KG/M2 | HEIGHT: 62 IN | WEIGHT: 153.88 LBS

## 2025-07-14 DIAGNOSIS — R13.10 DYSPHAGIA, UNSPECIFIED TYPE: ICD-10-CM

## 2025-07-14 PROCEDURE — 1126F AMNT PAIN NOTED NONE PRSNT: CPT | Mod: CPTII,S$GLB,,

## 2025-07-14 PROCEDURE — 3288F FALL RISK ASSESSMENT DOCD: CPT | Mod: CPTII,S$GLB,,

## 2025-07-14 PROCEDURE — 99214 OFFICE O/P EST MOD 30 MIN: CPT | Mod: S$GLB,,,

## 2025-07-14 PROCEDURE — 1159F MED LIST DOCD IN RCRD: CPT | Mod: CPTII,S$GLB,,

## 2025-07-14 PROCEDURE — 99999 PR PBB SHADOW E&M-EST. PATIENT-LVL III: CPT | Mod: PBBFAC,,,

## 2025-07-14 PROCEDURE — 1101F PT FALLS ASSESS-DOCD LE1/YR: CPT | Mod: CPTII,S$GLB,,

## 2025-07-14 RX ORDER — PANTOPRAZOLE SODIUM 40 MG/1
40 TABLET, DELAYED RELEASE ORAL DAILY
Qty: 90 TABLET | Refills: 3 | Status: SHIPPED | OUTPATIENT
Start: 2025-07-14 | End: 2026-07-14

## 2025-07-15 ENCOUNTER — TELEPHONE (OUTPATIENT)
Dept: GASTROENTEROLOGY | Facility: CLINIC | Age: 80
End: 2025-07-15
Payer: MEDICARE

## 2025-07-15 DIAGNOSIS — R13.10 DYSPHAGIA, UNSPECIFIED TYPE: Primary | ICD-10-CM

## 2025-07-15 NOTE — TELEPHONE ENCOUNTER
Copied from CRM #[id number]. Topic: Appointments - Appointment Scheduling  >> Jul 15, 2025 12:19 PM Lisseth wrote:  Type:  EGD Appointment Request      Name of Caller:  Chad Queen daughter in law/ transportation  When is the first available appointment?  N/A  Symptoms:  Scheduling EGD  Would the patient rather a call back or a response via [facility]? Call  Best Call Back Number:  [phone]  Additional Information:  Pt saw NP Mariaelena Moura yesterday and was instructed for Mrs Bonilla to call back to schedule since she would be bringing her to this appt. Can we please call back to schedule. Thank You.

## 2025-07-15 NOTE — TELEPHONE ENCOUNTER
EGD scheduled with Dr. Peterson. Patient received prep instructions while she was in clinic yesterday.

## 2025-08-14 ENCOUNTER — ANESTHESIA (OUTPATIENT)
Dept: ENDOSCOPY | Facility: HOSPITAL | Age: 80
End: 2025-08-14
Payer: MEDICARE

## 2025-08-14 ENCOUNTER — ANESTHESIA EVENT (OUTPATIENT)
Dept: ENDOSCOPY | Facility: HOSPITAL | Age: 80
End: 2025-08-14
Payer: MEDICARE

## 2025-08-14 ENCOUNTER — HOSPITAL ENCOUNTER (OUTPATIENT)
Facility: HOSPITAL | Age: 80
Discharge: HOME | End: 2025-08-14
Attending: INTERNAL MEDICINE | Admitting: INTERNAL MEDICINE
Payer: MEDICARE

## 2025-08-14 DIAGNOSIS — R13.10 DYSPHAGIA: ICD-10-CM

## 2025-08-14 DIAGNOSIS — K22.2 SCHATZKI RING OF DISTAL ESOPHAGUS: Primary | ICD-10-CM

## 2025-08-14 DIAGNOSIS — K29.70 GASTRITIS, PRESENCE OF BLEEDING UNSPECIFIED, UNSPECIFIED CHRONICITY, UNSPECIFIED GASTRITIS TYPE: ICD-10-CM

## 2025-08-14 PROCEDURE — 37000008 HC ANESTHESIA 1ST 15 MINUTES: Performed by: INTERNAL MEDICINE

## 2025-08-14 PROCEDURE — 25000003 PHARM REV CODE 250: Performed by: INTERNAL MEDICINE

## 2025-08-14 PROCEDURE — 43239 EGD BIOPSY SINGLE/MULTIPLE: CPT | Mod: XS,,, | Performed by: INTERNAL MEDICINE

## 2025-08-14 PROCEDURE — 88305 TISSUE EXAM BY PATHOLOGIST: CPT | Mod: TC,59 | Performed by: PATHOLOGY

## 2025-08-14 PROCEDURE — 43248 EGD GUIDE WIRE INSERTION: CPT | Performed by: INTERNAL MEDICINE

## 2025-08-14 PROCEDURE — 43239 EGD BIOPSY SINGLE/MULTIPLE: CPT | Mod: XS | Performed by: INTERNAL MEDICINE

## 2025-08-14 PROCEDURE — 63600175 PHARM REV CODE 636 W HCPCS: Performed by: NURSE ANESTHETIST, CERTIFIED REGISTERED

## 2025-08-14 PROCEDURE — 37000009 HC ANESTHESIA EA ADD 15 MINS: Performed by: INTERNAL MEDICINE

## 2025-08-14 PROCEDURE — 43248 EGD GUIDE WIRE INSERTION: CPT | Mod: ,,, | Performed by: INTERNAL MEDICINE

## 2025-08-14 RX ORDER — SODIUM CHLORIDE 9 MG/ML
INJECTION, SOLUTION INTRAVENOUS CONTINUOUS
Status: DISCONTINUED | OUTPATIENT
Start: 2025-08-14 | End: 2025-08-14 | Stop reason: HOSPADM

## 2025-08-14 RX ORDER — LIDOCAINE HYDROCHLORIDE 20 MG/ML
INJECTION, SOLUTION EPIDURAL; INFILTRATION; INTRACAUDAL; PERINEURAL
Status: DISCONTINUED | OUTPATIENT
Start: 2025-08-14 | End: 2025-08-14

## 2025-08-14 RX ORDER — PROPOFOL 10 MG/ML
VIAL (ML) INTRAVENOUS
Status: DISCONTINUED | OUTPATIENT
Start: 2025-08-14 | End: 2025-08-14

## 2025-08-14 RX ADMIN — PROPOFOL 50 MG: 10 INJECTION, EMULSION INTRAVENOUS at 11:08

## 2025-08-14 RX ADMIN — SODIUM CHLORIDE: 9 INJECTION, SOLUTION INTRAVENOUS at 10:08

## 2025-08-14 RX ADMIN — PROPOFOL 100 MG: 10 INJECTION, EMULSION INTRAVENOUS at 11:08

## 2025-08-14 RX ADMIN — LIDOCAINE HYDROCHLORIDE 100 MG: 20 INJECTION, SOLUTION EPIDURAL; INFILTRATION; INTRACAUDAL; PERINEURAL at 11:08

## 2025-08-15 VITALS
TEMPERATURE: 98 F | HEIGHT: 62 IN | SYSTOLIC BLOOD PRESSURE: 140 MMHG | BODY MASS INDEX: 27.6 KG/M2 | DIASTOLIC BLOOD PRESSURE: 70 MMHG | OXYGEN SATURATION: 97 % | HEART RATE: 74 BPM | WEIGHT: 150 LBS | RESPIRATION RATE: 18 BRPM

## 2025-08-18 ENCOUNTER — RESULTS FOLLOW-UP (OUTPATIENT)
Dept: GASTROENTEROLOGY | Facility: CLINIC | Age: 80
End: 2025-08-18
Payer: MEDICARE

## 2025-08-18 RX ORDER — AMOXICILLIN 500 MG/1
1000 CAPSULE ORAL EVERY 12 HOURS
Qty: 40 CAPSULE | Refills: 0 | Status: SHIPPED | OUTPATIENT
Start: 2025-08-18 | End: 2025-08-28

## 2025-08-18 RX ORDER — CLARITHROMYCIN 500 MG/1
500 TABLET, FILM COATED ORAL 2 TIMES DAILY
Qty: 20 TABLET | Refills: 0 | Status: SHIPPED | OUTPATIENT
Start: 2025-08-18 | End: 2025-08-28

## 2025-08-20 ENCOUNTER — HOSPITAL ENCOUNTER (OUTPATIENT)
Dept: RADIOLOGY | Facility: HOSPITAL | Age: 80
Discharge: HOME | End: 2025-08-20
Attending: HEALTH CARE PROVIDER
Payer: MEDICARE

## 2025-08-20 DIAGNOSIS — D32.9 MENINGIOMA: ICD-10-CM

## 2025-08-20 DIAGNOSIS — M54.6 PAIN IN THORACIC SPINE: ICD-10-CM

## 2025-08-20 LAB
CREAT SERPL-MCNC: 1.2 MG/DL (ref 0.5–1.4)
SAMPLE: NORMAL

## 2025-08-20 PROCEDURE — 82565 ASSAY OF CREATININE: CPT | Mod: PO

## 2025-08-20 PROCEDURE — A9585 GADOBUTROL INJECTION: HCPCS | Mod: PO | Performed by: HEALTH CARE PROVIDER

## 2025-08-20 PROCEDURE — 25500020 PHARM REV CODE 255: Mod: PO | Performed by: HEALTH CARE PROVIDER

## 2025-08-20 PROCEDURE — 72156 MRI NECK SPINE W/O & W/DYE: CPT | Mod: 26,,, | Performed by: RADIOLOGY

## 2025-08-20 RX ORDER — GADOBUTROL 604.72 MG/ML
7 INJECTION INTRAVENOUS
Status: COMPLETED | OUTPATIENT
Start: 2025-08-20 | End: 2025-08-20

## 2025-08-20 RX ADMIN — GADOBUTROL 7 ML: 604.72 INJECTION INTRAVENOUS at 09:08

## 2025-08-21 RX ORDER — TRAMADOL HYDROCHLORIDE 50 MG/1
50 TABLET, FILM COATED ORAL DAILY PRN
Qty: 30 TABLET | Refills: 2 | Status: SHIPPED | OUTPATIENT
Start: 2025-08-21

## 2025-08-25 DIAGNOSIS — Z00.00 ENCOUNTER FOR MEDICARE ANNUAL WELLNESS EXAM: ICD-10-CM

## (undated) DEVICE — GLOVE SENSICARE PI GRN 7.5

## (undated) DEVICE — NDL SPINAL 25GX3.5 SPINOCAN

## (undated) DEVICE — APPLICATOR CHLORAPREP ORN 26ML

## (undated) DEVICE — SYR DISP LL 5CC

## (undated) DEVICE — SYS LABEL CORRECT MED

## (undated) DEVICE — NDL ECLIPSE SAF REG 25GX1.5IN

## (undated) DEVICE — CANNULA RADIOPAQUE 20G CURVED

## (undated) DEVICE — CHLORAPREP 10.5 ML APPLICATOR

## (undated) DEVICE — NDL EPIDURAL 17GAX5IN

## (undated) DEVICE — DRAPE MEDIUM SHEET 40X70IN

## (undated) DEVICE — PAD GROUNDING DISPER ELECTRODE

## (undated) DEVICE — TOWEL OR DISP STRL BLUE 4/PK

## (undated) DEVICE — SYR GLASS 5CC LUER LOK